# Patient Record
Sex: FEMALE | Race: WHITE | Employment: FULL TIME | URBAN - METROPOLITAN AREA
[De-identification: names, ages, dates, MRNs, and addresses within clinical notes are randomized per-mention and may not be internally consistent; named-entity substitution may affect disease eponyms.]

---

## 2017-04-17 ENCOUNTER — OFFICE VISIT (OUTPATIENT)
Dept: FAMILY MEDICINE CLINIC | Age: 52
End: 2017-04-17

## 2017-04-17 VITALS
SYSTOLIC BLOOD PRESSURE: 122 MMHG | WEIGHT: 260.4 LBS | HEIGHT: 67 IN | DIASTOLIC BLOOD PRESSURE: 84 MMHG | BODY MASS INDEX: 40.87 KG/M2 | OXYGEN SATURATION: 97 % | TEMPERATURE: 98.2 F | RESPIRATION RATE: 18 BRPM | HEART RATE: 71 BPM

## 2017-04-17 DIAGNOSIS — E78.2 MIXED HYPERLIPIDEMIA: Primary | ICD-10-CM

## 2017-04-17 DIAGNOSIS — E55.9 VITAMIN D DEFICIENCY: ICD-10-CM

## 2017-04-17 DIAGNOSIS — F41.9 ANXIETY: ICD-10-CM

## 2017-04-17 DIAGNOSIS — R63.5 WEIGHT GAIN: ICD-10-CM

## 2017-04-17 PROBLEM — F41.0 PANIC ATTACKS: Status: ACTIVE | Noted: 2017-04-17

## 2017-04-17 RX ORDER — VITAMIN E 268 MG
CAPSULE ORAL DAILY
COMMUNITY
End: 2018-02-22

## 2017-04-17 RX ORDER — BISMUTH SUBSALICYLATE 262 MG
1 TABLET,CHEWABLE ORAL DAILY
COMMUNITY
End: 2018-02-22

## 2017-04-17 RX ORDER — GLUCOSAMINE SULFATE 1500 MG
POWDER IN PACKET (EA) ORAL DAILY
COMMUNITY
End: 2018-02-22

## 2017-04-17 RX ORDER — FLUOXETINE HYDROCHLORIDE 20 MG/1
20 CAPSULE ORAL DAILY
Qty: 30 CAP | Refills: 2 | Status: SHIPPED | OUTPATIENT
Start: 2017-04-17 | End: 2017-04-21 | Stop reason: SINTOL

## 2017-04-17 NOTE — MR AVS SNAPSHOT
Visit Information Date & Time Provider Department Dept. Phone Encounter #  
 4/17/2017  8:30 AM Polina Marroquin, [address] [facility] [phone] [id number] Upcoming Health Maintenance Date Due COLONOSCOPY 1/30/1981 BREAST CANCER SCRN MAMMOGRAM 11/8/2018 PAP AKA CERVICAL CYTOLOGY 9/19/2019 DTaP/Tdap/Td series (2 - Td) 8/20/2023 Allergies as of 4/17/2017  Review Complete On: 4/17/2017 By: Polina Marroquin MD  
  
 Severity Noted Reaction Type Reactions Crestor [Rosuvastatin]  02/07/2011    Other (comments) Ears ringing Lipitor [Atorvastatin]  02/07/2011    Myalgia Current Immunizations  Reviewed on 8/20/2013 Name Date Tdap 8/20/2013 Not reviewed this visit You Were Diagnosed With   
  
 Codes Comments Mixed hyperlipidemia    -  Primary ICD-10-CM: I21.5 ICD-9-CM: 272.2 Anxiety     ICD-10-CM: F41.9 ICD-9-CM: 300.00 Weight gain     ICD-10-CM: R63.5 ICD-9-CM: 783.1 Vitamin D deficiency     ICD-10-CM: E55.9 ICD-9-CM: 268.9 Vitals BP Pulse Temp Resp Height(growth percentile) Weight(growth percentile) 122/84 (BP 1 Location: Left arm, BP Patient Position: Sitting) 71 98.2 °F (36.8 °C) (Oral) 18 5' 7\" (1.702 m) 260 lb 6.4 oz (118.1 kg) LMP SpO2 BMI OB Status Smoking Status 07/19/2013 97% 40.78 kg/m2 Postmenopausal Never Smoker BMI and BSA Data Body Mass Index Body Surface Area 40.78 kg/m 2 2.36 m 2 Preferred Pharmacy Pharmacy Name Phone [facility] [address] [facility], [address] [facility] [phone] Your Updated Medication List  
  
   
This list is accurate as of: 4/17/17  9:23 AM.  Always use your most recent med list.  
  
  
  
  
 aspirin delayed-release 81 mg tablet Take 81 mg by mouth daily. diazePAM 2 mg tablet Commonly known as:  VALIUM Take 2.5 Tabs by mouth nightly as needed for Sleep (and muscles spasms in neck, tightness and tension). Max Daily Amount: 5 mg. FLUoxetine 20 mg capsule Commonly known as:  PROzac Take 1 Cap by mouth daily. multivitamin tablet Commonly known as:  ONE A DAY Take 1 Tab by mouth daily. VITAMIN D3 1,000 unit Cap Generic drug:  cholecalciferol Take  by mouth daily. vitamin E 400 unit capsule Commonly known as:  [address] Take  by mouth daily. Prescriptions Sent to Pharmacy Refills FLUoxetine (PROZAC) 20 mg capsule 2 Sig: Take 1 Cap by mouth daily. Class: Normal  
 Pharmacy: [address] [facility], [address] [place] [address],[address] Ph #: [phone] Route: Oral  
  
We Performed the Following LIPID PANEL [15395 CPT(R)] TSH 3RD GENERATION [08506 CPT(R)] VITAMIN D, 25 HYDROXY [id number] CPT(R)] Patient Instructions Anxiety Disorder: Care Instructions Your Care Instructions Anxiety is a normal reaction to stress. Difficult situations can cause you to have symptoms such as sweaty palms and a nervous feeling. In an anxiety disorder, the symptoms are far more severe. Constant worry, muscle tension, trouble sleeping, nausea and diarrhea, and other symptoms can make normal daily activities difficult or impossible. These symptoms may occur for no reason, and they can affect your work, school, or social life. Medicines, counseling, and self-care can all help. Follow-up care is a key part of your treatment and safety. Be sure to make and go to all appointments, and call your doctor if you are having problems. It's also a good idea to know your test results and keep a list of the medicines you take. How can you care for yourself at home? · Take medicines exactly as directed. Call your doctor if you think you are having a problem with your medicine. · Go to your counseling sessions and follow-up appointments. · Recognize and accept your anxiety.  Then, when you are in a situation that makes you anxious, say to yourself, \"This is not an emergency. I feel uncomfortable, but I am not in danger. I can keep going even if I feel anxious. \" · Be kind to your body: ¨ Relieve tension with exercise or a massage. ¨ Get enough rest. 
¨ Avoid alcohol, caffeine, nicotine, and illegal drugs. They can increase your anxiety level and cause sleep problems. ¨ Learn and do relaxation techniques. See below for more about these techniques. · Engage your mind. Get out and do something you enjoy. Go to a [employer] movie, or take a walk or hike. Plan your day. Having too much or too little to do can make you anxious. · Keep a record of your symptoms. Discuss your fears with a good friend or family member, or join a support group for people with similar problems. Talking to others sometimes relieves stress. · Get involved in social groups, or volunteer to help others. Being alone sometimes makes things seem worse than they are. · Get at least 30 minutes of exercise on most days of the week to relieve stress. Walking is a good choice. You also may want to do other activities, such as running, swimming, cycling, or playing tennis or team sports. Relaxation techniques Do relaxation exercises 10 to 20 minutes a day. You can play soothing, relaxing music while you do them, if you wish. · Tell others in your house that you are going to do your relaxation exercises. Ask them not to disturb you. · Find a comfortable place, away from all distractions and noise. · Lie down on your back, or sit with your back straight. · Focus on your breathing. Make it slow and steady. · Breathe in through your nose. Breathe out through either your nose or mouth. · Breathe deeply, filling up the area between your navel and your rib cage. Breathe so that your belly goes up and down. · Do not hold your breath. · Breathe like this for 5 to 10 minutes. Notice the feeling of calmness throughout your whole body. As you continue to breathe slowly and deeply, relax by doing the following for another 5 to 10 minutes: · Tighten and relax each muscle group in your body. You can begin at your toes and work your way up to your head. · Imagine your muscle groups relaxing and becoming heavy. · Empty your mind of all thoughts. · Let yourself relax more and more deeply. · Become aware of the state of calmness that surrounds you. · When your relaxation time is over, you can bring yourself back to alertness by moving your fingers and toes and then your hands and feet and then stretching and moving your entire body. Sometimes people fall asleep during relaxation, but they usually wake up shortly afterward. · Always give yourself time to return to full alertness before you drive a car or do anything that might cause an accident if you are not fully alert. Never play a relaxation tape while you drive a car. When should you call for help? Call 911 anytime you think you may need emergency care. For example, call if: 
· You feel you cannot stop from hurting yourself or someone else. Keep the numbers for these national suicide hotlines: 2-129-449-TALK (6-922.791.4705) and 2-415-MGOIZCU (2-755.509.2501). If you or someone you know talks about suicide or feeling hopeless, get help right away. Watch closely for changes in your health, and be sure to contact your doctor if: 
· You have anxiety or fear that affects your life. · You have symptoms of anxiety that are new or different from those you had before. Where can you learn more? Go to http://rosa-susan.info/. Enter P754 in the search box to learn more about \"Anxiety Disorder: Care Instructions. \" Current as of: July 26, 2016 Content Version: 11.2 © 0499-0101 [employer], [employer].  Care instructions adapted under license by [employer] (which disclaims liability or warranty for this information). If you have questions about a medical condition or this instruction, always ask your healthcare professional. [address] any warranty or liability for your use of this information. Introducing [place]! Shruti Blackmon introduces iHandle patient portal. Now you can access parts of your medical record, email your doctor's office, and request medication refills online. 1. In your internet browser, go to [email or web address] 2. Click on the First Time User? Click Here link in the Sign In box. You will see the New Member Sign Up page. 3. Enter your iHandle Access Code exactly as it appears below. You will not need to use this code after youve completed the sign-up process. If you do not sign up before the expiration date, you must request a new code. · iHandle Access Code: [email or web address] Expires: 7/16/2017  9:23 AM 
 
4. Enter the last four digits of your Social Security Number (xxxx) and Date of Birth (mm/dd/yyyy) as indicated and click Submit. You will be taken to the next sign-up page. 5. Create a iHandle ID. This will be your iHandle login ID and cannot be changed, so think of one that is secure and easy to remember. 6. Create a iHandle password. You can change your password at any time. 7. Enter your Password Reset Question and Answer. This can be used at a later time if you forget your password. 8. Enter your e-mail address. You will receive e-mail notification when new information is available in [address] 9. Click Sign Up. You can now view and download portions of your medical record. 10. Click the Download Summary menu link to download a portable copy of your medical information. If you have questions, please visit the Frequently Asked Questions section of the iHandle website. Remember, iHandle is NOT to be used for urgent needs. For medical emergencies, dial 911. Now available from your iPhone and Android! Please provide this summary of care documentation to your next provider. Your primary care clinician is listed as MAURA GEIGER. If you have any questions after today's visit, please call [phone].

## 2017-04-17 NOTE — PATIENT INSTRUCTIONS

## 2017-04-17 NOTE — PROGRESS NOTES
HISTORY OF PRESENT ILLNESS  Wild Rogers is a 45 y.o. female. HPI  Fasting follow up cholesterol and vit D, and discuss changing Lexapro. Admits her eating habits have not been good and she is not exercising. She did start OTC vit D daily. Feels like the Lexapro is working well for her generalized anxiety and panic attacks but is worried that it has contributed a great deal to her wt gain. Since changing from Wellbutrin to the Lexapro, her wt is up. But I changed her due to increased anxiety and panic attacks. She is very happy that her anxiety has been so much better. She has not been feeling depressed either. She has been under some recent stress at work and admits she is a stress eater anyway. She has been craving carbs more, eating more and wt is going up significantly. She wants to change the Lexapro bc she feels that has made it worse. She is worried about if she goes off if that she will start having panic attacks again bc the Lexapro has really really helped w/ that, but wants to at least try. Review of Systems   Constitutional: Negative. Respiratory: Negative. Cardiovascular: Negative. Gastrointestinal: Negative. Psychiatric/Behavioral: Negative for depression.       Problem List  Date Reviewed: 4/17/2017          Codes Class Noted    Panic attacks ICD-10-CM: F41.0  ICD-9-CM: 300.01  4/17/2017        Anxiety ICD-10-CM: F41.9  ICD-9-CM: 300.00  9/19/2016    Overview Signed 9/19/2016  2:26 PM by Nando Deleon MD     [phone]             Postmenopause, LMP ~ 45 yo, 2013, No HRT ICD-10-CM: Z78.0  ICD-9-CM: V49.81  9/19/2016        Palpitations ICD-10-CM: R00.2  ICD-9-CM: 785.1  1/9/2015    Overview Signed 4/2/2015 12:40 PM by Didier Thurston MD     1/16 event monitor, pac's otherwise unremarkable             SOB (shortness of breath) ICD-10-CM: R06.02  ICD-9-CM: 786.05  1/9/2015        History of mitral valve disorder ICD-10-CM: Z86.79  ICD-9-CM: V12.59  12/19/2013 Overview Signed 12/19/2013  3:57 PM by Dami Caba MD     In her late 19's; told it was \"benign\", no follow up             Vitamin D deficiency ICD-10-CM: E55.9  ICD-9-CM: 268.9  8/20/2013    Overview Signed 8/20/2013  2:18 PM by Dami Caba MD     2011             Perimenopause ICD-10-CM: N95.1  ICD-9-CM: 627.2  8/20/2013    Overview Signed 8/20/2013  2:21 PM by Dami Caba MD     Since 2012             Scoliosis ICD-10-CM: M41.9  ICD-9-CM: 737.30  6/28/2013    Overview Signed 6/28/2013 10:18 AM by Dami Caba MD     Since childhood; no surgery or brace; PT only             Depressive disorder, not elsewhere classified ICD-10-CM: F32.9  ICD-9-CM: 098  2/7/2011        Mixed hyperlipidemia ICD-10-CM: E78.2  ICD-9-CM: 272.2  2/7/2011        Esophageal reflux ICD-10-CM: K21.9  ICD-9-CM: 530.81  2/7/2011            Past Surgical History:   Procedure Laterality Date    HX CYST REMOVAL  15 yo       Current Outpatient Prescriptions   Medication Sig    multivitamin (ONE A DAY) tablet Take 1 Tab by mouth daily.  vitamin E (AQUA GEMS) 400 unit capsule Take  by mouth daily.  cholecalciferol (VITAMIN D3) 1,000 unit cap Take  by mouth daily.  escitalopram oxalate (LEXAPRO) 10 mg tablet TAKE ONE TABLET BY MOUTH EVERY EVENING    aspirin delayed-release 81 mg tablet Take 81 mg by mouth daily.        Allergies   Allergen Reactions    Crestor [Rosuvastatin] Other (comments)     Ears ringing    Lipitor [Atorvastatin] Myalgia     Social History     Social History    Marital status:      Spouse name: N/A    Number of children: 0    Years of education: N/A     Occupational History     at the [employer] Top[employer]    Smoking status: Never Smoker    Smokeless tobacco: Never Used    Alcohol use No    Drug use: No    Sexual activity: Yes     Partners: Male      Comment: Perimenopause and Rhythm     Other Topics Concern    Caffeine Concern No     16 oz of half caff coffee, but eats alot of chocolate ~ 4-5 x a week    Stress Concern Yes     alot of stress at work   Kerry Pina Weight Concern Yes     has gained a lot of weight the past few months, stress eating, not exercising    Special Diet No     just joined [employer] [id number] and plans to start that    Exercise No     Social History Narrative     Family History   Problem Relation Age of Onset    Breast Cancer Mother      diagnosed in her 52's, survivor    Other Mother 64     heart arrhythmia    Cancer Father 61     lung    Diabetes Father      type 2    Hypertension Father     No Known Problems Sister     No Known Problems Brother     No Known Problems Brother     Breast Cancer Maternal Grandmother       in her late 55's-59's    Heart Attack Maternal Grandfather       in his mid [de-identified] from an MI    Other Paternal Grandmother       natural causes in old age   Kerry Root Other Paternal Grandfather       in car accident    Other Sister 50     heart arrhythmia    Breast Cancer Maternal Aunt      Visit Vitals    /84 (BP 1 Location: Left arm, BP Patient Position: Sitting)    Pulse 71    Temp 98.2 °F (36.8 °C) (Oral)    Resp 18    Ht 5' 7\" (1.702 m)    Wt 260 lb 6.4 oz (118.1 kg)    LMP 2013    SpO2 97%    BMI 40.78 kg/m2     Physical Exam   Constitutional: She is oriented to person, place, and time. No distress. Cardiovascular: Normal rate, regular rhythm and normal heart sounds. No murmur heard. Pulmonary/Chest: Effort normal and breath sounds normal.   Neurological: She is alert and oriented to person, place, and time. Psychiatric: She has a normal mood and affect. Her behavior is normal. Thought content normal.   Vitals reviewed. ASSESSMENT and PLAN    ICD-10-CM ICD-9-CM    1. Mixed hyperlipidemia E78.2 272.2 LIPID PANEL   2. Anxiety F41.9 300.00 FLUoxetine (PROZAC) 20 mg capsule   3.  Weight gain R63.5 783.1 TSH 3RD GENERATION 4. Vitamin D deficiency E55.9 268.9 VITAMIN D, 25 HYDROXY     Fasting labs today  Lexapro working great for her anxiety/panic d/o but significant wt gain  Would like to try alternative  Trial of Prozac 20 mg qam in its place  Reviewed medications and side effects in detail  Also reviewed diet, nutrition, exercise and weight control  She joined [employer] and plans to start that; encouraged regular exercise program, start low, advance slowly as tolerated  Cardiovascular risk and specific lipid/LDL goals reviewed  Further follow up & other recommendations pending review of labs as well  If all remains good and stable, would like to see her back for 3 month med check to follow up on SSRI treatment and wt check
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
## 2017-04-18 LAB
25(OH)D3+25(OH)D2 SERPL-MCNC: 27.8 NG/ML (ref 30–100)
CHOLEST SERPL-MCNC: 202 MG/DL (ref 100–199)
HDLC SERPL-MCNC: 48 MG/DL
INTERPRETATION, 910389: NORMAL
LDLC SERPL CALC-MCNC: 133 MG/DL (ref 0–99)
TRIGL SERPL-MCNC: 107 MG/DL (ref 0–149)
TSH SERPL DL<=0.005 MIU/L-ACNC: 1.5 UIU/ML (ref 0.45–4.5)
VLDLC SERPL CALC-MCNC: 21 MG/DL (ref 5–40)

## 2017-04-30 NOTE — PROGRESS NOTES
Vit D improving, still low but close to wnl. Cont vit D supplement daily  LDL cholesterol about same but HDL/good cholesterol went down some from last check  She had just joined [employer] and I would like her to also try to gradually build to at least 150 minutes exercise per week  Fasting follow up 6months.  RTC sooner prn

## 2017-10-15 RX ORDER — ESCITALOPRAM OXALATE 10 MG/1
TABLET ORAL
Qty: 90 TAB | Refills: 1 | Status: SHIPPED | OUTPATIENT
Start: 2017-10-15 | End: 2018-04-30 | Stop reason: SDUPTHER

## 2017-10-16 RX ORDER — ESCITALOPRAM OXALATE 10 MG/1
TABLET ORAL
Qty: 90 TAB | Refills: 1 | Status: CANCELLED | OUTPATIENT
Start: 2017-10-16

## 2017-10-16 NOTE — TELEPHONE ENCOUNTER
Pharmacy on file verified.      Requested Prescriptions     Pending Prescriptions Disp Refills    escitalopram oxalate (LEXAPRO) 10 mg tablet 90 Tab 1

## 2018-02-22 ENCOUNTER — OFFICE VISIT (OUTPATIENT)
Dept: FAMILY MEDICINE CLINIC | Age: 53
End: 2018-02-22

## 2018-02-22 VITALS
RESPIRATION RATE: 18 BRPM | BODY MASS INDEX: 42.56 KG/M2 | HEART RATE: 86 BPM | SYSTOLIC BLOOD PRESSURE: 110 MMHG | WEIGHT: 271.2 LBS | OXYGEN SATURATION: 99 % | HEIGHT: 67 IN | DIASTOLIC BLOOD PRESSURE: 76 MMHG | TEMPERATURE: 98.1 F

## 2018-02-22 DIAGNOSIS — E78.2 MIXED HYPERLIPIDEMIA: ICD-10-CM

## 2018-02-22 DIAGNOSIS — N95.1 MENOPAUSAL SYNDROME (HOT FLASHES): ICD-10-CM

## 2018-02-22 DIAGNOSIS — Z12.11 SCREENING FOR COLORECTAL CANCER: ICD-10-CM

## 2018-02-22 DIAGNOSIS — Z11.59 NEED FOR HEPATITIS C SCREENING TEST: ICD-10-CM

## 2018-02-22 DIAGNOSIS — F41.8 DEPRESSION WITH ANXIETY: ICD-10-CM

## 2018-02-22 DIAGNOSIS — Z00.00 ROUTINE GENERAL MEDICAL EXAMINATION AT A HEALTH CARE FACILITY: Primary | ICD-10-CM

## 2018-02-22 DIAGNOSIS — Z12.31 ENCOUNTER FOR SCREENING MAMMOGRAM FOR BREAST CANCER: ICD-10-CM

## 2018-02-22 DIAGNOSIS — Z12.12 SCREENING FOR COLORECTAL CANCER: ICD-10-CM

## 2018-02-22 DIAGNOSIS — E55.9 VITAMIN D DEFICIENCY: ICD-10-CM

## 2018-02-22 DIAGNOSIS — Z78.0 POSTMENOPAUSE: ICD-10-CM

## 2018-02-22 PROBLEM — E66.01 OBESITY, MORBID (HCC): Status: ACTIVE | Noted: 2018-02-22

## 2018-02-22 PROBLEM — M72.2 PLANTAR FASCIITIS, BILATERAL: Status: ACTIVE | Noted: 2018-02-22

## 2018-02-22 NOTE — PROGRESS NOTES
HISTORY OF PRESENT ILLNESS   HPI  Annual CPE, fasting. Overall has been getting along pretty well and feeling well in general.  Seeing her podiatrist routinely for problems w/ plantar fasciitis of both feet. Waiting on her orthotics. She has not been taking her vitamin D x over a year but just purchased some and will start it after recommendations based on today's labs. Also admits eating a lot of junk food. Wt is up again. She knows that losing wt will help her feet and her chronic leg edema. Just not motivated and states she loves junk food. Her mood has been good on Lexapro. Not feeling sad, depressed or anxious. She does have  hot flashes. Does not really want to do anything about them but notices they are worse when she increases her caffeine intake. REVIEW OF SYMPTOMS     Review of Systems   Constitutional: Negative. HENT: Negative. Eyes: Negative. Respiratory: Negative. Cardiovascular: Negative for chest pain, palpitations, orthopnea, claudication and PND. Gastrointestinal: Negative. Negative for abdominal pain, blood in stool, constipation, diarrhea, nausea and vomiting. Genitourinary: Negative for dysuria, frequency, hematuria and urgency. No breast complaints. No abnormal vaginal bleeding, discharge or urinary complaints. Mild vaginal dryness but not bothered by it bc they are not sexually active.     Neurological: Negative.            PROBLEM LIST/MEDICAL HISTORY      Problem List  Date Reviewed: 2/22/2018          Codes Class Noted    Ob[facility], mor[facility] ICD-10-CM: E66.01  ICD-9-CM: 278.01  2/22/2018        Plantar fasciitis, bilateral ICD-10-CM: M72.2  ICD-9-CM: 728.71  2/22/2018    Overview Signed 2/22/2018 11:30 AM by Julio Caro MD     Podiatrist Dr Jacinto Martinez 1-2018, orthotics             Panic attacks ICD-10-CM: F41.0  ICD-9-CM: 300.01  4/17/2017        Anxiety ICD-10-CM: F41.9  ICD-9-CM: 300.00  9/19/2016    Overview Signed 9/19/2016  2:26 PM by Jigar Pineda MD     [id number]             Postmenopause, LMP ~ 45 yo, 2013, No HRT ICD-10-CM: Z78.0  ICD-9-CM: V49.81  9/19/2016        Palpitations ICD-10-CM: R00.2  ICD-9-CM: 785.1  1/9/2015    Overview Signed 4/2/2015 12:40 PM by Treva Doll MD     1/16 event monitor, pac's otherwise unremarkable             SOB (shortness of breath) ICD-10-CM: R06.02  ICD-9-CM: 786.05  1/9/2015        History of mitral valve disorder ICD-10-CM: Z86.79  ICD-9-CM: V12.59  12/19/2013    Overview Signed 12/19/2013  3:57 PM by Jigar Pineda MD     In her late 19's; told it was \"benign\", no follow up             Vitamin D deficiency ICD-10-CM: E55.9  ICD-9-CM: 268.9  8/20/2013    Overview Signed 8/20/2013  2:18 PM by Jigar Pineda MD     2011             Perimenopause ICD-10-CM: N95.1  ICD-9-CM: 627.2  8/20/2013    Overview Signed 8/20/2013  2:21 PM by Jigar Pineda MD     Since 2012             Scoliosis ICD-10-CM: M41.9  ICD-9-CM: 737.30  6/28/2013    Overview Signed 6/28/2013 10:18 AM by Jigar Pineda MD     Since childhood; no surgery or brace; PT only             Depressive disorder, not elsewhere classified ICD-10-CM: F32.9  ICD-9-CM: 535  2/7/2011        Mixed hyperlipidemia ICD-10-CM: E78.2  ICD-9-CM: 272.2  2/7/2011        Esophageal reflux ICD-10-CM: K21.9  ICD-9-CM: 530.81  2/7/2011                  PAST SURGICAL HISTORY       Past Surgical History:   Procedure Laterality Date    HX CYST REMOVAL  13 yo         MEDICATIONS      Current Outpatient Prescriptions   Medication Sig    escitalopram oxalate (LEXAPRO) 10 mg tablet TAKE ONE TABLET BY MOUTH ONCE DAILY IN  THE  EVENING    aspirin delayed-release 81 mg tablet Take 81 mg by mouth daily. No current facility-administered medications for this visit.            ALLERGIES     Allergies   Allergen Reactions    Crestor [Rosuvastatin] Other (comments)     Ears ringing    Lipitor [Atorvastatin] Myalgia    Prozac [Fluoxetine] Other (comments)     Dizziness and lack of coordination          SOCIAL HISTORY       Social History     Social History    Marital status:      Spouse name: N/A    Number of children: 0    Years of education: N/A     Occupational History     at the [employer] Topics    Smoking status: Never Smoker    Smokeless tobacco: Never Used    Alcohol use No    Drug use: No    Sexual activity: Yes     Partners: Male      Comment: Perimenopause and Rhythm     Other Topics Concern    Caffeine Concern No     16 oz of half caff coffee, but eats alot of chocolate ~ 4-5 x a week    Weight Concern Yes     has gained a lot of weight again, not eating healthy, minimal exercise    Special Diet No     nothing lately, eating lots of junk food; did [place] Rule ~ 2016 x 1 yr, successfully lost ~ 100 lbs but stopped bc it got expensive and regained the wt    Exercise Yes     walks puppy qd x 20 minutes     Social History Narrative        IMMUNIZATIONS  Immunization History   Administered Date(s) Administered    Tdap 2013         FAMILY HISTORY     Family History   Problem Relation Age of Onset    Breast Cancer Mother      diagnosed in her 52's, survivor    Other Mother 64     heart arrhythmia    Cancer Father 61     lung    Diabetes Father      type 2    Hypertension Father     No Known Problems Sister     No Known Problems Brother     No Known Problems Brother     Breast Cancer Maternal Grandmother       in her late 55's-59's    Heart Attack Maternal Grandfather       in his mid [de-identified] from an MI    Other Paternal Grandmother       natural causes in old age   [facility] Other Paternal Grandfather       in car accident    Other Sister 50     heart arrhythmia    Breast Cancer Maternal Aunt          VITALS     Visit Vitals    /76 (BP 1 Location: Left arm, BP Patient Position: Sitting)    Pulse 86    Temp 98.1 °F (36.7 °C) (Oral)    Resp 18    Ht 5' 7\" (1.702 m)    Wt 271 lb 3.2 oz (123 kg)    LMP 07/19/2013    SpO2 99%    BMI 42.48 kg/m2          PHYSICAL EXAMINATION     Physical Exam   Constitutional: She is oriented to person, place, and time. No distress. HENT:   Right Ear: Tympanic membrane and ear canal normal.   Left Ear: Tympanic membrane and ear canal normal.   Nose: Nose normal.   Mouth/Throat: Oropharynx is clear and moist. No oropharyngeal exudate. Eyes: Conjunctivae and EOM are normal. Pupils are equal, round, and reactive to light. Neck: Neck supple. No JVD present. Carotid bruit is not present. No thyromegaly present. Cardiovascular: Normal rate, regular rhythm and normal heart sounds. Exam reveals no gallop. No murmur heard. Pulmonary/Chest: Effort normal and breath sounds normal. No respiratory distress. She has no wheezes. She has no rales. Right breast exhibits no inverted nipple, no mass, no nipple discharge, no skin change and no tenderness. Left breast exhibits no inverted nipple, no mass, no nipple discharge, no skin change and no tenderness. Abdominal: Soft. There is no tenderness. Obese     Genitourinary: Vagina normal, uterus normal, right adnexa normal and left adnexa normal.   Genitourinary Comments: Pap deferred this year, negative 9-2016   Musculoskeletal: She exhibits no tenderness. Large legs, obese. Deep sock impression at B ankles. Skin warm, dry, intact. No erythema. Uses stockings prn   Lymphadenopathy:     She has no cervical adenopathy. She has no axillary adenopathy. Right: No supraclavicular adenopathy present. Left: No supraclavicular adenopathy present. Neurological: She is alert and oriented to person, place, and time. Coordination normal.   Skin: Skin is warm and dry. Psychiatric: Mood, affect and judgment normal.   Vitals reviewed.              ASSESSMENT & PLAN       ICD-10-CM ICD-9-CM    1.  Routine general medical examination at a [facility] care facility Z00.00 V70.0 LIPID PANEL      METABOLIC PANEL, COMPREHENSIVE      CBC W/O DIFF      TSH 3RD GENERATION      VITAMIN D, 25 HYDROXY      HEMOGLOBIN A1C WITH EAG   2. Postmenopause, LMP ~ 47 yo, 2013, No HRT Z78.0 V49.81    3. Menopausal syndrome (hot flashes) N95.1 627.2    4. Encounter for screening mammogram for breast cancer Z12.31 V76.12 MARILU MAMMO BI SCREENING INCL CAD   5. Screening for colorectal cancer Z12.11 V76.51 COLOGUARD TEST (FECAL DNA COLORECTAL CANCER SCREENING)    Z12.12     6. Mixed hyperlipidemia E78.2 272.2 LIPID PANEL   7. Vitamin D deficiency E55.9 268.9 VITAMIN D, 25 HYDROXY   8. Depression with anxiety F41.8 300.4    9. Need for hepatitis C screening test Z11.59 V73.89 HEPATITIS C AB     Fasting labs today  Reviewed diet, exercise and weight control  Patient counseled about current BMI, goals, diet, nutrition, exercise, weight management. Nutrition/meal planning discussed. Monitor weights. Appropriate patient education materials included with or without corresponding AVS via handout or MeterHerot if activated. Reassess at next follow up visit. Cardiovascular risk and specific lipid/LDL goals reviewed  Reviewed medications and side effects. Doing well on Lexapro and wants to cont same regimen at this time. For  hot flashes, menopause diet/care tips shared w/ pt, handouts/AVS given  Further follow up & other recommendations pending review of labs. If all remains good and stable, RTC 1 yr annual CPE.  Follow up sooner prn

## 2018-02-22 NOTE — PROGRESS NOTES
Chief Complaint   Patient presents with    Complete Physical     fasting - does have well woman here- last pap 9/2016     1. Have you been to the ER, urgent care clinic since your last visit? Hospitalized since your last visit? No    2. Have you seen or consulted any other health care providers outside of the [address] [place] [address] since your last visit? Include any pap smears or colon screening.    Yes [facility]           Dr Laura Muir

## 2018-02-22 NOTE — MR AVS SNAPSHOT
[address] 
[phone] Patient: Gallo Weber MRN: [id number] MLI:[id number] Visit Information Date & Time Provider Department Dept. Phone Encounter #  
 2/22/2018 11:00 AM Aubree Bearden, [address] [phone] [id number] Upcoming Health Maintenance Date Due COLONOSCOPY 1/30/1981 BREAST CANCER SCRN MAMMOGRAM 11/8/2018 PAP AKA CERVICAL CYTOLOGY 9/19/2019 DTaP/Tdap/Td series (2 - Td) 8/20/2023 Allergies as of 2/22/2018  Review Complete On: 2/22/2018 By: Aubree Bearden MD  
  
 Severity Noted Reaction Type Reactions Crestor [Rosuvastatin]  02/07/2011    Other (comments) Ears ringing Lipitor [Atorvastatin]  02/07/2011    Myalgia Prozac [Fluoxetine] Low 04/21/2017   Side Effect Other (comments) Dizziness and lack of coordination Current Immunizations  Reviewed on 8/20/2013 Name Date Tdap 8/20/2013 Not reviewed this visit You Were Diagnosed With   
  
 Codes Comments Routine general medical examination at a health care facility    -  Primary ICD-10-CM: Z00.00 ICD-9-CM: V70.0 Postmenopause     ICD-10-CM: Z78.0 ICD-9-CM: V49.81 Menopausal syndrome (hot flashes)     ICD-10-CM: N95.1 ICD-9-CM: 627.2 Encounter for screening mammogram for breast cancer     ICD-10-CM: Z12.31 
ICD-9-CM: V76.12 Screening for colorectal cancer     ICD-10-CM: Z12.11, Z12.12 
ICD-9-CM: V76.51 Mixed hyperlipidemia     ICD-10-CM: E78.2 ICD-9-CM: 272.2 Vitamin D deficiency     ICD-10-CM: E55.9 ICD-9-CM: 268.9 Vitals BP Pulse Temp Resp Height(growth percentile) Weight(growth percentile) 110/76 (BP 1 Location: Left arm, BP Patient Position: Sitting) 86 98.1 °F (36.7 °C) (Oral) 18 5' 7\" (1.702 m) 271 lb 3.2 oz (123 kg) LMP SpO2 BMI OB Status Smoking Status 07/19/2013 99% 42.48 kg/m2 Postmenopausal Never Smoker BMI and BSA Data Body Mass Index Body Surface Area  
 42.48 kg/m 2 2.41 m 2 Preferred Pharmacy Pharmacy Name Phone [facility] [address] [facility], [address] [facility] [phone] Your Updated Medication List  
  
   
This list is accurate as of 2/22/18 11:52 AM.  Always use your most recent med list.  
  
  
  
  
 aspirin delayed-release 81 mg tablet Take 81 mg by mouth daily. escitalopram oxalate 10 mg tablet Commonly known as:  Hope Bong TAKE ONE TABLET BY MOUTH ONCE DAILY IN  THE  EVENING We Performed the Following CBC W/O DIFF [31074 CPT(R)] COLOGUARD TEST (FECAL DNA COLORECTAL CANCER SCREENING) [20752 CPT(R)] HEMOGLOBIN A1C WITH EAG [99954 CPT(R)] LIPID PANEL [12914 CPT(R)] METABOLIC PANEL, COMPREHENSIVE [76248 CPT(R)] TSH 3RD GENERATION [66460 CPT(R)] VITAMIN D, 25 HYDROXY [id number] CPT(R)] To-Do List   
 02/22/2018 Imaging:  MARILU MAMMO BI SCREENING INCL CAD Patient Instructions Menopause Diet: Care Instructions Your Care Instructions Healthy eating helps ease menopause symptoms. And it can reduce your risk for getting conditions such as osteoporosis and heart disease. Follow-up care is a key part of your treatment and safety. Be sure to make and go to all appointments, and call your doctor if you are having problems. It's also a good idea to know your test results and keep a list of the medicines you take. How can you care for yourself at home? · Limit fats in your diet. · Choose foods that have a lot of calcium. The recommended daily intake for adults ages 23 to 48 is 1,000 milligrams (mg). Adults over 50 need 1,200 mg a day. Take a calcium supplement if you don't get enough calcium in the foods you eat. · Add vitamin D to your daily diet. It helps your body use calcium.  The recommended daily intake of vitamin D is 600 international units (IU) a day for children and adults up to age 79. Adults age 70 and older need 800 IU a day. Take vitamin D supplements if you need to. · Include good sources of fiber in your diet each day. These include whole grains, beans, fruits, and vegetables. · Avoid simple sugars. This helps if you have mood swings, anxiety, or depression. · Avoid caffeine, or cut back on it. Caffeine can cause sleep problems. It can also make you feel anxious. To relieve these symptoms, pay attention to how much caffeine you are getting in drinks and chocolate. · Limit your intake of alcohol. Heavy drinking tends to make symptoms worse. Where can you learn more? Go to http://rosa-susan.info/. Enter Y480 in the search box to learn more about \"Menopause Diet: Care Instructions. \" Current as of: May 12, 2017 Content Version: 11.4 © 7994-0733 [employer]. Care instructions adapted under license by [employer] (which disclaims liability or warranty for this information). If you have questions about a medical condition or this instruction, always ask your healthcare professional. [place] [address] any warranty or liability for your use of this information. Hot Flashes During Menopause: Care Instructions Your Care Instructions A hot flash is a sudden feeling of intense body heat. Your head, neck, and chest may get red. Your heartbeat may speed up, and you may feel anxious or irritable. You may find that hot flashes occur more often in warm rooms or during stressful times. Hot flashes and other symptoms are a normal response to the hormone changes that occur before your menstrual cycle goes away completely (menopause). Hot flashes often get better and go away with time.  Making a few changes, such as exercising more, practicing meditation, quitting smoking, and drinking less alcohol, can help. Some women take hormone pills or other medicine to treat bothersome symptoms. Follow-up care is a key part of your treatment and safety. Be sure to make and go to all appointments, and call your doctor if you are having problems. It's also a good idea to know your test results and keep a list of the medicines you take. How can you care for yourself at home? · If you decide to take medicine to treat hot flashes, take it exactly as prescribed. Call your doctor if you think you are having a problem with your medicine. You will get more details on the specific medicine your doctor prescribes. · Learn to meditate. Sit quietly and focus on your breathing. Try to practice each day. Books, classes, and tapes can help you start a program. 
· Wear natural fabrics, such as cotton and silk. Dress in layers so you can take off clothes as needed. · Keep the room temperature cool, or use a fan. You are more likely to have a hot flash when you are too warm than when you are cool. · Use fewer blankets when you sleep at night. · Drink cold fluids rather than hot ones. Limit your intake of caffeine and alcohol. · Eat smaller meals more often during the day so your body makes less heat than when digesting large amounts of food. Eat low-fat and high-fiber foods. · Do not smoke. Smoking can make hot flashes worse. If you need help quitting, talk to your doctor about stop-smoking programs and medicines. These can increase your chances of quitting for good. · Get at least 30 minutes of exercise on most days of the week. Walking is a good choice. You also may want to do other activities, such as running, swimming, cycling, or playing tennis or team sports. Where can you learn more? Go to http://rosa-susan.info/. Enter F700 in the search box to learn more about \"Hot Flashes During Menopause: Care Instructions. \" Current as of: October 13, 2016 Content Version: 11.4 © 5122-3850 [employer]. Care instructions adapted under license by [employer] (which disclaims liability or warranty for this information). If you have questions about a medical condition or this instruction, always ask your healthcare professional. [address] any warranty or liability for your use of this information. Introducing [facility]! [address] introduces [employer] patient portal. Now you can access parts of your medical record, email your doctor's office, and request medication refills online. 1. In your internet browser, go to [email or web address] 2. Click on the First Time User? Click Here link in the Sign In box. You will see the New Member Sign Up page. 3. Enter your [employer] Access Code exactly as it appears below. You will not need to use this code after youve completed the sign-up process. If you do not sign up before the expiration date, you must request a new code. · [employer] Access Code: [email or web address] Expires: 5/23/2018 11:52 AM 
 
4. Enter the last four digits of your Social Security Number (xxxx) and Date of Birth (mm/dd/yyyy) as indicated and click Submit. You will be taken to the next sign-up page. 5. Create a [facility] ID. This will be your [employer] login ID and cannot be changed, so think of one that is secure and easy to remember. 6. Create a [employer] password. You can change your password at any time. 7. Enter your Password Reset Question and Answer. This can be used at a later time if you forget your password. 8. Enter your e-mail address. You will receive e-mail notification when new information is available in [address] 9. Click Sign Up. You can now view and download portions of your medical record. 10. Click the Download Summary menu link to download a portable copy of your medical information.  
 
If you have questions, please visit the Frequently Asked Questions section of the [employer]. Remember, [employer] is NOT to be used for urgent needs. For medical emergencies, dial 911. Now available from your iPhone and Android! Please provide this summary of care documentation to your next provider. Your primary care clinician is listed as MAURA GEIGER. If you have any questions after today's visit, please call [phone].

## 2018-02-22 NOTE — PATIENT INSTRUCTIONS
Menopause Diet: Care Instructions  Your Care Instructions    Healthy eating helps ease menopause symptoms. And it can reduce your risk for getting conditions such as osteoporosis and heart disease. Follow-up care is a key part of your treatment and safety. Be sure to make and go to all appointments, and call your doctor if you are having problems. It's also a good idea to know your test results and keep a list of the medicines you take. How can you care for yourself at home? · Limit fats in your diet. · Choose foods that have a lot of calcium. The recommended daily intake for adults ages 23 to 48 is 1,000 milligrams (mg). Adults over 50 need 1,200 mg a day. Take a calcium supplement if you don't get enough calcium in the foods you eat. · Add vitamin D to your daily diet. It helps your body use calcium. The recommended daily intake of vitamin D is 600 international units (IU) a day for children and adults up to age 79. Adults age 70 and older need 800 IU a day. Take vitamin D supplements if you need to. · Include good sources of fiber in your diet each day. These include whole grains, beans, fruits, and vegetables. · Avoid simple sugars. This helps if you have mood swings, anxiety, or depression. · Avoid caffeine, or cut back on it. Caffeine can cause sleep problems. It can also make you feel anxious. To relieve these symptoms, pay attention to how much caffeine you are getting in drinks and chocolate. · Limit your intake of alcohol. Heavy drinking tends to make symptoms worse. Where can you learn more? Go to http://rosa-susan.info/. Enter Q128 in the search box to learn more about \"Menopause Diet: Care Instructions. \"  Current as of: May 12, 2017  Content Version: 11.4  © 4749-5102 [employer]. Care instructions adapted under license by [employer] (which disclaims liability or warranty for this information).  If you have questions about a medical condition or this instruction, always ask your healthcare professional. [address] any warranty or liability for your use of this information. Hot Flashes During Menopause: Care Instructions  Your Care Instructions    A hot flash is a sudden feeling of intense body heat. Your head, neck, and chest may get red. Your heartbeat may speed up, and you may feel anxious or irritable. You may find that hot flashes occur more often in warm rooms or during stressful times. Hot flashes and other symptoms are a normal response to the hormone changes that occur before your menstrual cycle goes away completely (menopause). Hot flashes often get better and go away with time. Making a few changes, such as exercising more, practicing meditation, quitting smoking, and drinking less alcohol, can help. Some women take hormone pills or other medicine to treat bothersome symptoms. Follow-up care is a key part of your treatment and safety. Be sure to make and go to all appointments, and call your doctor if you are having problems. It's also a good idea to know your test results and keep a list of the medicines you take. How can you care for yourself at home? · If you decide to take medicine to treat hot flashes, take it exactly as prescribed. Call your doctor if you think you are having a problem with your medicine. You will get more details on the specific medicine your doctor prescribes. · Learn to meditate. Sit quietly and focus on your breathing. Try to practice each day. Books, classes, and tapes can help you start a program.  · Wear natural fabrics, such as cotton and silk. Dress in layers so you can take off clothes as needed. · Keep the room temperature cool, or use a fan. You are more likely to have a hot flash when you are too warm than when you are cool. · Use fewer blankets when you sleep at night. · Drink cold fluids rather than hot ones. Limit your intake of caffeine and alcohol.   · Eat smaller meals more often during the day so your body makes less heat than when digesting large amounts of food. Eat low-fat and high-fiber foods. · Do not smoke. Smoking can make hot flashes worse. If you need help quitting, talk to your doctor about stop-smoking programs and medicines. These can increase your chances of quitting for good. · Get at least 30 minutes of exercise on most days of the week. Walking is a good choice. You also may want to do other activities, such as running, swimming, cycling, or playing tennis or team sports. Where can you learn more? Go to http://rosa-susan.info/. Enter F700 in the search box to learn more about \"Hot Flashes During Menopause: Care Instructions. \"  Current as of: October 13, 2016  Content Version: 11.4  © 1256-2198 [employer]. Care instructions adapted under license by [employer] (which disclaims liability or warranty for this information). If you have questions about a medical condition or this instruction, always ask your healthcare professional. [place] [address] any warranty or liability for your use of this information. Body Mass Index: Care Instructions  Your Care Instructions    Body mass index (BMI) can help you see if your weight is raising your risk for health problems. It uses a formula to compare how much you weigh with how tall you are. · A BMI lower than 18.5 is considered underweight. · A BMI between 18.5 and 24.9 is considered healthy. · A BMI between 25 and 29.9 is considered overweight. A BMI of 30 or higher is considered obese. If your BMI is in the normal range, it means that you have a lower risk for weight-related health problems. If your BMI is in the overweight or obese range, you may be at increased risk for weight-related health problems, such as high blood pressure, heart disease, stroke, arthritis or joint pain, and diabetes.  If your BMI is in the underweight range, you may be at increased risk for health problems such as fatigue, lower protection (immunity) against illness, muscle loss, bone loss, hair loss, and hormone problems. BMI is just one measure of your risk for weight-related health problems. You may be at higher risk for health problems if you are not active, you eat an unhealthy diet, or you drink too much alcohol or use tobacco products. Follow-up care is a key part of your treatment and safety. Be sure to make and go to all appointments, and call your doctor if you are having problems. It's also a good idea to know your test results and keep a list of the medicines you take. How can you care for yourself at home? · Practice healthy eating habits. This includes eating plenty of fruits, vegetables, whole grains, lean protein, and low-fat dairy. · If your doctor recommends it, get more exercise. Walking is a good choice. Bit by bit, increase the amount you walk every day. Try for at least 30 minutes on most days of the week. · Do not smoke. Smoking can increase your risk for health problems. If you need help quitting, talk to your doctor about stop-smoking programs and medicines. These can increase your chances of quitting for good. · Limit alcohol to 2 drinks a day for men and 1 drink a day for women. Too much alcohol can cause health problems. If you have a BMI higher than 25  · Your doctor may do other tests to check your risk for weight-related health problems. This may include measuring the distance around your waist. A waist measurement of more than 40 inches in men or 35 inches in women can increase the risk of weight-related health problems. · Talk with your doctor about steps you can take to stay healthy or improve your health. You may need to make lifestyle changes to lose weight and stay healthy, such as changing your diet and getting regular exercise.   If you have a BMI lower than 18.5  · Your doctor may do other tests to check your risk for health problems. · Talk with your doctor about steps you can take to stay healthy or improve your health. You may need to make lifestyle changes to gain or maintain weight and stay healthy, such as getting more healthy foods in your diet and doing exercises to build muscle. Where can you learn more? Go to http://rosa-susan.info/. Enter S176 in the search box to learn more about \"Body Mass Index: Care Instructions. \"  Current as of: October 13, 2016  Content Version: 11.4  © 3261-9412 [employer]. Care instructions adapted under license by [employer] (which disclaims liability or warranty for this information). If you have questions about a medical condition or this instruction, always ask your healthcare professional. [address] any warranty or liability for your use of this information.

## 2018-02-23 LAB
25(OH)D3+25(OH)D2 SERPL-MCNC: 21.4 NG/ML (ref 30–100)
ALBUMIN SERPL-MCNC: 4.3 G/DL (ref 3.5–5.5)
ALBUMIN/GLOB SERPL: 1.5 {RATIO} (ref 1.2–2.2)
ALP SERPL-CCNC: 71 IU/L (ref 39–117)
ALT SERPL-CCNC: 23 IU/L (ref 0–32)
AST SERPL-CCNC: 20 IU/L (ref 0–40)
BILIRUB SERPL-MCNC: 0.5 MG/DL (ref 0–1.2)
BUN SERPL-MCNC: 11 MG/DL (ref 6–24)
BUN/CREAT SERPL: 14 (ref 9–23)
CALCIUM SERPL-MCNC: 9.3 MG/DL (ref 8.7–10.2)
CHLORIDE SERPL-SCNC: 100 MMOL/L (ref 96–106)
CHOLEST SERPL-MCNC: 246 MG/DL (ref 100–199)
CO2 SERPL-SCNC: 27 MMOL/L (ref 18–29)
CREAT SERPL-MCNC: 0.81 MG/DL (ref 0.57–1)
ERYTHROCYTE [DISTWIDTH] IN BLOOD BY AUTOMATED COUNT: 13.6 % (ref 12.3–15.4)
EST. AVERAGE GLUCOSE BLD GHB EST-MCNC: 105 MG/DL
GFR SERPLBLD CREATININE-BSD FMLA CKD-EPI: 82 ML/MIN/{1.73_M2}
GFR SERPLBLD CREATININE-BSD FMLA CKD-EPI: 95 ML/MIN/{1.73_M2}
GLOBULIN SER CALC-MCNC: 2.8 G/L (ref 1.5–4.5)
GLUCOSE SERPL-MCNC: 91 MG/DL (ref 65–99)
HBA1C MFR BLD: 5.3 % (ref 4.8–5.6)
HCT VFR BLD AUTO: 40.8 % (ref 34–46.6)
HCV AB S/CO SERPL IA: <0.1 S/CO RATIO (ref 0–0.9)
HDLC SERPL-MCNC: 55 MG/DL
HGB BLD-MCNC: 13.2 G/DL (ref 11.1–15.9)
INTERPRETATION, 910389: NORMAL
LDLC SERPL CALC-MCNC: 156 MG/DL (ref 0–99)
MCH RBC QN AUTO: 29.5 PG (ref 26.6–33)
MCHC RBC AUTO-ENTMCNC: 32.4 G/DL (ref 31.5–35.7)
MCV RBC AUTO: 91 FL (ref 79–97)
PLATELET # BLD AUTO: 243 X10E3/UL (ref 150–379)
POTASSIUM SERPL-SCNC: 4.1 MMOL/L (ref 3.5–5.2)
PROT SERPL-MCNC: 7.1 G/DL (ref 6–8.5)
RBC # BLD AUTO: 4.48 X10E6/UL (ref 3.77–5.28)
SODIUM SERPL-SCNC: 141 MMOL/L (ref 134–144)
TRIGL SERPL-MCNC: 177 MG/DL (ref 0–149)
TSH SERPL DL<=0.005 MIU/L-ACNC: 1.59 UIU/ML (ref 0.45–4.5)
VLDLC SERPL CALC-MCNC: 35 MG/DL (ref 5–40)
WBC # BLD AUTO: 4.3 X10E3/UL (ref 3.4–10.8)

## 2018-03-01 ENCOUNTER — TELEPHONE (OUTPATIENT)
Dept: FAMILY MEDICINE CLINIC | Age: 53
End: 2018-03-01

## 2018-03-01 NOTE — TELEPHONE ENCOUNTER
Dorina Dumont calling from [place] Dr. Zari Steve on the process of this DNA Stool Test, order status, medicare guidelines for the test.   Dr. Zari Steve order number: [id number]    Phone number: [phone] [facility] provider support service.

## 2018-03-04 ENCOUNTER — TELEPHONE (OUTPATIENT)
Dept: FAMILY MEDICINE CLINIC | Age: 53
End: 2018-03-04

## 2018-03-04 NOTE — TELEPHONE ENCOUNTER
Blood counts, liver, kidney, thyroid normal  Hep C negative  Vitamin D. She told me she has not been taking Vitamin D x over a year. I would recommend otc Vit D 3 1,000 units daily  Fasting BS and HgBA1c both very good and normal  TG high and have gone up from last checks and LDL has worsened, highest it has been the last several checks. HDL remains good. At her last appt, she admitted she had not been eating healthy or exercising. Follow guidelines below and have fasting follow up cholesterol check in 6months    Adhere to the following Lifestyle Modifications below:  Diet:  ~Consume a dietary pattern that emphasizes intake of vegetables, fruits, whole grains, poultry, fish, low fat dairy, legumes, non tropical vegetable oils and nuts. ~Avoid red meat, saturated fats, fried foods, sweets, and sugars. Limit carbs to no more than one serving per meal and <40 g per serving. Limit snacks to <15 g carbs. Increase lean protein. ~Avoid added salt and if you have a history of high blood pressure also avoid added salt and limit sodium intake to < 2 grams per day. ~Reference the AHA (American Heart Association) Diet or DASH diet for additional guidelines. ~Limit alcohol to no more than 1 standard drink per day for women and 2 for men (ie/ 12 oz beer, 5 oz wine, 1.5 oz liquor). ~Avoid tobacco products. ~Limit caffeine to no more than 1-2 small servings per day. Exercise:  ~Get regular moderate intensity cardio/aerobic exercise at least 150 minutes per week ie/ 30-50 minutes 3-4 x a week.

## 2018-09-04 ENCOUNTER — OFFICE VISIT (OUTPATIENT)
Dept: FAMILY MEDICINE CLINIC | Age: 53
End: 2018-09-04

## 2018-09-04 VITALS
BODY MASS INDEX: 45.17 KG/M2 | HEIGHT: 67 IN | HEART RATE: 77 BPM | OXYGEN SATURATION: 97 % | DIASTOLIC BLOOD PRESSURE: 80 MMHG | TEMPERATURE: 98.5 F | SYSTOLIC BLOOD PRESSURE: 110 MMHG | WEIGHT: 287.8 LBS | RESPIRATION RATE: 16 BRPM

## 2018-09-04 DIAGNOSIS — E78.2 MIXED HYPERLIPIDEMIA: Primary | ICD-10-CM

## 2018-09-04 DIAGNOSIS — F41.9 ANXIETY: ICD-10-CM

## 2018-09-04 RX ORDER — ROSUVASTATIN CALCIUM 5 MG/1
5 TABLET, COATED ORAL
Qty: 90 TAB | Refills: 0 | Status: SHIPPED | OUTPATIENT
Start: 2018-09-04 | End: 2018-11-18 | Stop reason: SDUPTHER

## 2018-09-04 NOTE — MR AVS SNAPSHOT
[facility] 
 
 
 [address] [facility] [address] 
[phone] Patient: Brenna Brady MRN: [id number] OEL:[id number] Visit Information Date & Time Provider Department Dept. Phone Encounter #  
 9/4/2018  8:00 AM [address] [facility] [address],  [facility] [phone] [id number] Upcoming Health Maintenance Date Due  
 BREAST CANCER SCRN MAMMOGRAM 11/8/2018 Influenza Age 5 to Adult 3/1/2019* PAP AKA CERVICAL CYTOLOGY 9/19/2019 DTaP/Tdap/Td series (2 - Td) 8/20/2023 COLONOSCOPY 2/22/2028 *Topic was postponed. The date shown is not the original due date. Allergies as of 9/4/2018  Review Complete On: 9/4/2018 By: [address] [facility] [address], [place]  
  
 Severity Noted Reaction Type Reactions Bee Sting [Sting, Bee]  09/04/2018    Swelling Lipitor [Atorvastatin]  02/07/2011    Myalgia Prozac [Fluoxetine] Low 04/21/2017   Side Effect Other (comments) Dizziness and lack of coordination Current Immunizations  Reviewed on 8/20/2013 Name Date Tdap 8/20/2013 Not reviewed this visit You Were Diagnosed With   
  
 Codes Comments Mixed hyperlipidemia    -  Primary ICD-10-CM: Z21.1 ICD-9-CM: 272.2 Anxiety     ICD-10-CM: F41.9 ICD-9-CM: 300.00 Vitals BP Pulse Temp Resp Height(growth percentile) Weight(growth percentile) 110/80 (BP 1 Location: Right arm, BP Patient Position: Sitting) 77 98.5 °F (36.9 °C) (Oral) 16 5' 7\" (1.702 m) 287 lb 12.8 oz (130.5 kg) LMP SpO2 BMI OB Status Smoking Status 07/19/2013 97% 45.08 kg/m2 Postmenopausal Never Smoker Vitals History BMI and BSA Data Body Mass Index Body Surface Area 45.08 kg/m 2 2.48 m 2 Preferred Pharmacy Pharmacy Name Phone [facility] [address] [facility], [address] [phone] Your Updated Medication List  
  
   
 This list is accurate as of 9/4/18  8:34 AM.  Always use your most recent med list.  
  
  
  
  
 aspirin delayed-release 81 mg tablet Take 81 mg by mouth daily. escitalopram oxalate 10 mg tablet Commonly known as:  Wilene Heads TAKE ONE TABLET BY MOUTH ONCE DAILY IN THE EVENING  
  
 rosuvastatin 5 mg tablet Commonly known as:  CRESTOR Take 1 Tab by mouth nightly. Indications: mixed hyperlipidemia Prescriptions Sent to Pharmacy Refills  
 rosuvastatin (CRESTOR) 5 mg tablet 0 Sig: Take 1 Tab by mouth nightly. Indications: mixed hyperlipidemia Class: Normal  
 Pharmacy: [address] [facility], [address] [place] [address],[address]  #: [phone] Route: Oral  
  
Patient Instructions High Cholesterol: Care Instructions Your Care Instructions Cholesterol is a type of fat in your blood. It is needed for many body functions, such as making new cells. Cholesterol is made by your body. It also comes from food you eat. High cholesterol means that you have too much of the fat in your blood. This raises your risk of a heart attack and stroke. LDL and HDL are part of your total cholesterol. LDL is the \"bad\" cholesterol. High LDL can raise your risk for heart disease, heart attack, and stroke. HDL is the \"good\" cholesterol. It helps clear bad cholesterol from the body. High HDL is linked with a lower risk of heart disease, heart attack, and stroke. Your cholesterol levels help your doctor find out your risk for having a heart attack or stroke. You and your doctor can talk about whether you need to lower your risk and what treatment is best for you. A heart-healthy lifestyle along with medicines can help lower your cholesterol and your risk. The way you choose to lower your risk will depend on how high your risk is for heart attack and stroke. It will also depend on how you feel about taking medicines. Follow-up care is a key part of your treatment and safety. Be sure to make and go to all appointments, and call your doctor if you are having problems. It's also a good idea to know your test results and keep a list of the medicines you take. How can you care for yourself at home? · Eat a variety of foods every day. Good choices include fruits, vegetables, whole grains (like oatmeal), dried beans and peas, nuts and seeds, soy products (like tofu), and fat-free or low-fat dairy products. · Replace butter, margarine, and hydrogenated or partially hydrogenated oils with olive and canola oils. (Canola oil margarine without trans fat is fine.) · Replace red meat with fish, poultry, and soy protein (like tofu). · Limit processed and packaged foods like chips, crackers, and cookies. · Bake, broil, or steam foods. Don't robledo them. · Be physically active. Get at least 30 minutes of exercise on most days of the week. Walking is a good choice. You also may want to do other activities, such as running, swimming, cycling, or playing tennis or team sports. · Stay at a healthy weight or lose weight by making the changes in eating and physical activity listed above. Losing just a small amount of weight, even 5 to 10 pounds, can reduce your risk for having a heart attack or stroke. · Do not smoke. When should you call for help? Watch closely for changes in your health, and be sure to contact your doctor if: 
  · You need help making lifestyle changes.  
  · You have questions about your medicine. Where can you learn more? Go to http://rosa-susan.info/. Enter V854 in the search box to learn more about \"High Cholesterol: Care Instructions. \" Current as of: May 10, 2017 Content Version: 11.7 © 9897-1742 [employer], [employer].  Care instructions adapted under license by [employer] (which disclaims liability or warranty for this information). If you have questions about a medical condition or this instruction, always ask your healthcare professional. [address] any warranty or liability for your use of this information. Introducing [facility]! [employer] introduces AGRIMAPS patient portal. Now you can access parts of your medical record, email your doctor's office, and request medication refills online. 1. In your internet browser, go to [email or web address] 2. Click on the First Time User? Click Here link in the Sign In box. You will see the New Member Sign Up page. 3. Enter your AGRIMAPS Access Code exactly as it appears below. You will not need to use this code after youve completed the sign-up process. If you do not sign up before the expiration date, you must request a new code. · AGRIMAPS Access Code: [email or web address] Expires: 12/3/2018  8:34 AM 
 
4. Enter the last four digits of your Social Security Number (xxxx) and Date of Birth (mm/dd/yyyy) as indicated and click Submit. You will be taken to the next sign-up page. 5. Create a AGRIMAPS ID. This will be your AGRIMAPS login ID and cannot be changed, so think of one that is secure and easy to remember. 6. Create a AGRIMAPS password. You can change your password at any time. 7. Enter your Password Reset Question and Answer. This can be used at a later time if you forget your password. 8. Enter your e-mail address. You will receive e-mail notification when new information is available in [address] 9. Click Sign Up. You can now view and download portions of your medical record. 10. Click the Download Summary menu link to download a portable copy of your medical information. If you have questions, please visit the Frequently Asked Questions section of the AGRIMAPS website. Remember, AGRIMAPS is NOT to be used for urgent needs. For medical emergencies, dial 911. Now available from your iPhone and Android! Please provide this summary of care documentation to your next provider. Your primary care clinician is listed as MAURA GEIGER. If you have any questions after today's visit, please call [phone].

## 2018-09-04 NOTE — PROGRESS NOTES
1. Have you been to the ER, urgent care clinic since your last visit? Hospitalized since your last visit[facility]- about 3 months ago. 2. Have you seen or consulted any other health care providers outside of the [facility] since your last visit? Include any pap smears or colon screening. No  
 
Chief Complaint Patient presents with  Cholesterol Problem  
  follow up  Anxiety  
  follow up Fasting

## 2018-09-04 NOTE — PROGRESS NOTES
HISTORY OF PRESENT ILLNESS  
HPI Fasting follow up hyperlipidemia and anxiety doing well on Lexapro. She admits that since her last visit she has not been compliant w/ diet or exercise. Wt steadily going up. She had been opposed to trying another statin before but is really starting to worry about her health now and wants to try Crestor again bc she knows \"my cholesterol is going to be through the roof because I have not been doing what I am supposed to\". On her allergy list, it noted that Crestor caused ringing in the ears previously but she states she has that chronically anyway and would rather just be on the Crestor again. She otherwise tolerated it fine. REVIEW OF SYMPTOMS  
  Review of Systems Constitutional: Negative. Respiratory: Negative. Cardiovascular: Negative. Gastrointestinal: Negative. Psychiatric/Behavioral: Negative for depression.  
 
 
  
 PROBLEM LIST/MEDICAL HISTORY  
  
Problem List  Date Reviewed: 9/4/2018 Codes Class Noted Obesity, morbid ([facility] [address] ICD-10-CM: E66.01 
ICD-9-CM: 278.01  2/22/2018 Plantar fasciitis, bilateral ICD-10-CM: M72.2 ICD-9-CM: 728.71  2/22/2018 Overview Signed 2/22/2018 11:30 AM by Sadaf Barrientos MD  
  Podiatrist Dr Delia Trimble 1-2018, orthotics Panic attacks ICD-10-CM: F41.0 ICD-9-CM: 300.01  4/17/2017 Anxiety ICD-10-CM: F41.9 ICD-9-CM: 300.00  9/19/2016 Overview Signed 9/19/2016  2:26 PM by Sadaf Barrientos MD  
  0-0258 Postmenopause, LMP ~ 45 yo, 2013, No HRT ICD-10-CM: Z78.0 ICD-9-CM: V49.81  9/19/2016 Palpitations ICD-10-CM: R00.2 ICD-9-CM: 785.1  1/9/2015 Overview Signed 4/2/2015 12:40 PM by Asmita Kirk MD  
  1/16 event monitor, pac's otherwise unremarkable 
  
  
   
 SOB (shortness of breath) ICD-10-CM: R06.02 
ICD-9-CM: 786.05  1/9/2015 History of mitral valve disorder ICD-10-CM: Z86.79 
ICD-9-CM: V12.59  12/19/2013 Overview Signed 12/19/2013  3:57 PM by Tami March MD  
  In her late 19's; told it was \"benign\", no follow up Vitamin D deficiency ICD-10-CM: E55.9 ICD-9-CM: 268.9  8/20/2013 Overview Signed 8/20/2013  2:18 PM by Tami March MD  
  2011 Perimenopause ICD-10-CM: N95.1 ICD-9-CM: 627.2  8/20/2013 Overview Signed 8/20/2013  2:21 PM by Tami March MD  
  Since 2012 Scoliosis ICD-10-CM: M41.9 ICD-9-CM: 737.30  6/28/2013 Overview Signed 6/28/2013 10:18 AM by Tami March MD  
  Since childhood; no surgery or brace; PT only Depressive disorder, not elsewhere classified ICD-10-CM: F32.9 ICD-9-CM: 009  2/7/2011 Mixed hyperlipidemia ICD-10-CM: E78.2 ICD-9-CM: 272.2  2/7/2011 Esophageal reflux ICD-10-CM: K21.9 ICD-9-CM: 530.81  2/7/2011  
   
  
 
 
  PAST SURGICAL HISTORY  
   
Past Surgical History:  
Procedure Laterality Date  HX CYST REMOVAL  15 yo  
 
 
 
MEDICATIONS  
  
Current Outpatient Prescriptions Medication Sig  escitalopram oxalate (LEXAPRO) 10 mg tablet TAKE ONE TABLET BY MOUTH ONCE DAILY IN THE EVENING  
 aspirin delayed-release 81 mg tablet Take 81 mg by mouth daily. No current facility-administered medications for this visit. ALLERGIES Allergies Allergen Reactions  Bee Sting [Sting, Bee] Swelling  Crestor [Rosuvastatin] Other (comments) Ears ringing  Lipitor [Atorvastatin] Myalgia  Prozac [Fluoxetine] Other (comments) Dizziness and lack of coordination SOCIAL HISTORY  
   
Social History Social History  Marital status:  Spouse name: N/A  
 Number of children: 0  
 Years of education: N/A Occupational History   at the [employer] [address] Social History Main Topics  Smoking status: Never Smoker  Smokeless tobacco: Never Used  Alcohol use No  
 Drug use:  No  
  Sexual activity: Yes  
  Partners: Male Comment: Perimenopause and Rhythm Other Topics Concern  Caffeine Concern No  
  16 oz of half caff coffee, but eats alot of chocolate ~ 4-5 x a week  Weight Concern Yes  
  overweight  Special Diet No  
 Exercise Yes  
  tries to walk her puppy qd x 20 minutes but hurts due to plantar faciisits Social History Narrative  
 
  
IMMUNIZATIONS Immunization History Administered Date(s) Administered  Tdap 2013 FAMILY HISTORY Family History Problem Relation Age of Onset  Breast Cancer Mother   
  diagnosed in her 52's, survivor  Other Mother 64  
  heart arrhythmia  Cancer Father 61  
  lung  Diabetes Father   
  type 2  
 Hypertension Father  No Known Problems Sister  No Known Problems Brother  No Known Problems Brother  Breast Cancer Maternal Grandmother   
   in her late 55's-59's  Heart Attack Maternal Grandfather   
   in his mid [de-identified] from an MI  
 Other Paternal Grandmother   
   natural causes in old age  Other Paternal Grandfather   
   in car accident  Other Sister 50  
  heart arrhythmia  Breast Cancer Maternal Aunt Jessie Santiago Visit Vitals  /80 (BP 1 Location: Right arm, BP Patient Position: Sitting)  Pulse 77  Temp 98.5 °F (36.9 °C) (Oral)  Resp 16  
 Ht 5' 7\" (1.702 m)  Wt 287 lb 12.8 oz (130.5 kg)  LMP 2013  SpO2 97%  BMI 45.08 kg/m2 PHYSICAL EXAMINATION  
  Physical Exam  
Constitutional: No distress. Cardiovascular: Normal rate, regular rhythm and normal heart sounds. No murmur heard. Pulmonary/Chest: Effort normal and breath sounds normal. No respiratory distress. Psychiatric: Mood, affect and judgment normal.  
Vitals reviewed. 
 
 
  
 DIAGNOSTIC DATA  
 
  
 LABORATORY DATA  
   
Lab Results Component Value Date/Time Cholesterol, total 246 (H) 2018 11:56 AM  
 HDL Cholesterol 55 02/22/2018 11:56 AM  
LDL, calculated 156 (H) 02/22/2018 11:56 AM  
Triglyceride 177 (H) 02/22/2018 11:56 AM  
CHOL/HDL Ratio 3.8 01/14/2010 08:47 AM  
 
Lab Results Component Value Date/Time TSH 1.590 02/22/2018 11:56 AM  
  
Lab Results Component Value Date/Time Sodium 141 02/22/2018 11:56 AM  
 Potassium 4.1 02/22/2018 11:56 AM  
 Chloride 100 02/22/2018 11:56 AM  
 CO2 27 02/22/2018 11:56 AM  
 Anion gap 6 01/14/2010 08:47 AM  
 Glucose 91 02/22/2018 11:56 AM  
 BUN 11 02/22/2018 11:56 AM  
 Creatinine 0.81 02/22/2018 11:56 AM  
 BUN/Creatinine ratio 14 02/22/2018 11:56 AM  
 GFR est AA 95 02/22/2018 11:56 AM  
 GFR est non-AA 82 02/22/2018 11:56 AM  
 Calcium 9.3 02/22/2018 11:56 AM  
 Bilirubin, total 0.5 02/22/2018 11:56 AM  
 ALT (SGPT) 23 02/22/2018 11:56 AM  
 AST (SGOT) 20 02/22/2018 11:56 AM  
 Alk. phosphatase 71 02/22/2018 11:56 AM  
 Protein, total 7.1 02/22/2018 11:56 AM  
 Albumin 4.3 02/22/2018 11:56 AM  
 Globulin 2.9 01/14/2010 08:47 AM  
 A-G Ratio 1.5 02/22/2018 11:56 AM  
   
 
 ASSESSMENT & PLAN  
 
  ICD-10-CM ICD-9-CM 1. Mixed hyperlipidemia E78.2 272.2 rosuvastatin (CRESTOR) 5 mg tablet LIPID PANEL 2. Anxiety F41.9 300.00 Stable on Lexapro daily at this time Fasting labs today Cardiovascular risk and specific lipid/LDL goals reviewed Reviewed diet, nutrition, exercise, weight management, BMI/goals, age/risk based screening recommendations, health maintenance & prevention counseling. Reviewed medications and side effects in detail She admits that since her last visit she has not been compliant w/ diet or exercise. Wt steadily going up. She had been opposed to trying another statin before but is really starting to worry about her health now and wants to try Crestor again bc she knows \"my cholesterol is going to be through the roof because I have not been doing what I am supposed to\". On her allergy list, it noted that Crestor caused ringing in the ears previously but she states she has that chronically anyway and would rather just be on the Crestor again. She otherwise tolerated it fine. I sent low dose Crestor 5 mg to her pharm on file.  
Fasting follow up 3months.

## 2018-09-04 NOTE — PATIENT INSTRUCTIONS
High Cholesterol: Care Instructions Your Care Instructions Cholesterol is a type of fat in your blood. It is needed for many body functions, such as making new cells. Cholesterol is made by your body. It also comes from food you eat. High cholesterol means that you have too much of the fat in your blood. This raises your risk of a heart attack and stroke. LDL and HDL are part of your total cholesterol. LDL is the \"bad\" cholesterol. High LDL can raise your risk for heart disease, heart attack, and stroke. HDL is the \"good\" cholesterol. It helps clear bad cholesterol from the body. High HDL is linked with a lower risk of heart disease, heart attack, and stroke. Your cholesterol levels help your doctor find out your risk for having a heart attack or stroke. You and your doctor can talk about whether you need to lower your risk and what treatment is best for you. A heart-healthy lifestyle along with medicines can help lower your cholesterol and your risk. The way you choose to lower your risk will depend on how high your risk is for heart attack and stroke. It will also depend on how you feel about taking medicines. Follow-up care is a key part of your treatment and safety. Be sure to make and go to all appointments, and call your doctor if you are having problems. It's also a good idea to know your test results and keep a list of the medicines you take. How can you care for yourself at home? · Eat a variety of foods every day. Good choices include fruits, vegetables, whole grains (like oatmeal), dried beans and peas, nuts and seeds, soy products (like tofu), and fat-free or low-fat dairy products. · Replace butter, margarine, and hydrogenated or partially hydrogenated oils with olive and canola oils. (Canola oil margarine without trans fat is fine.) · Replace red meat with fish, poultry, and soy protein (like tofu). · Limit processed and packaged foods like chips, crackers, and cookies. · Bake, broil, or steam foods. Don't robledo them. · Be physically active. Get at least 30 minutes of exercise on most days of the week. Walking is a good choice. You also may want to do other activities, such as running, swimming, cycling, or playing tennis or team sports. · Stay at a healthy weight or lose weight by making the changes in eating and physical activity listed above. Losing just a small amount of weight, even 5 to 10 pounds, can reduce your risk for having a heart attack or stroke. · Do not smoke. When should you call for help? Watch closely for changes in your health, and be sure to contact your doctor if: 
  · You need help making lifestyle changes.  
  · You have questions about your medicine. Where can you learn more? Go to http://rosa-susan.info/. Enter S218 in the search box to learn more about \"High Cholesterol: Care Instructions. \" Current as of: May 10, 2017 Content Version: 11.7 © 5794-3411 [employer]. Care instructions adapted under license by [employer] (which disclaims liability or warranty for this information). If you have questions about a medical condition or this instruction, always ask your healthcare professional. [address] any warranty or liability for your use of this information. Body Mass Index: Care Instructions Your Care Instructions Body mass index (BMI) can help you see if your weight is raising your risk for health problems. It uses a formula to compare how much you weigh with how tall you are. · A BMI lower than 18.5 is considered underweight. · A BMI between 18.5 and 24.9 is considered healthy. · A BMI between 25 and 29.9 is considered overweight. A BMI of 30 or higher is considered obese. If your BMI is in the normal range, it means that you have a lower risk for weight-related health problems.  If your BMI is in the overweight or obese range, you may be at increased risk for weight-related health problems, such as high blood pressure, heart disease, stroke, arthritis or joint pain, and diabetes. If your BMI is in the underweight range, you may be at increased risk for health problems such as fatigue, lower protection (immunity) against illness, muscle loss, bone loss, hair loss, and hormone problems. BMI is just one measure of your risk for weight-related health problems. You may be at higher risk for health problems if you are not active, you eat an unhealthy diet, or you drink too much alcohol or use tobacco products. Follow-up care is a key part of your treatment and safety. Be sure to make and go to all appointments, and call your doctor if you are having problems. It's also a good idea to know your test results and keep a list of the medicines you take. How can you care for yourself at home? · Practice healthy eating habits. This includes eating plenty of fruits, vegetables, whole grains, lean protein, and low-fat dairy. · If your doctor recommends it, get more exercise. Walking is a good choice. Bit by bit, increase the amount you walk every day. Try for at least 30 minutes on most days of the week. · Do not smoke. Smoking can increase your risk for health problems. If you need help quitting, talk to your doctor about stop-smoking programs and medicines. These can increase your chances of quitting for good. · Limit alcohol to 2 drinks a day for men and 1 drink a day for women. Too much alcohol can cause health problems. If you have a BMI higher than 25 · Your doctor may do other tests to check your risk for weight-related health problems. This may include measuring the distance around your waist. A waist measurement of more than 40 inches in men or 35 inches in women can increase the risk of weight-related health problems.  
· Talk with your doctor about steps you can take to stay healthy or improve your health. You may need to make lifestyle changes to lose weight and stay healthy, such as changing your diet and getting regular exercise. If you have a BMI lower than 18.5 · Your doctor may do other tests to check your risk for health problems. · Talk with your doctor about steps you can take to stay healthy or improve your health. You may need to make lifestyle changes to gain or maintain weight and stay healthy, such as getting more healthy foods in your diet and doing exercises to build muscle. Where can you learn more? Go to http://rosa-susan.info/. Enter S176 in the search box to learn more about \"Body Mass Index: Care Instructions. \" Current as of: October 13, 2016 Content Version: 11.4 © 1133-8656 [employer]. Care instructions adapted under license by [facility] (which disclaims liability or warranty for this information). If you have questions about a medical condition or this instruction, always ask your healthcare professional. [address] any warranty or liability for your use of this information.

## 2018-09-05 LAB
CHOLEST SERPL-MCNC: 210 MG/DL (ref 100–199)
HDLC SERPL-MCNC: 56 MG/DL
INTERPRETATION, 910389: NORMAL
LDLC SERPL CALC-MCNC: 132 MG/DL (ref 0–99)
TRIGL SERPL-MCNC: 108 MG/DL (ref 0–149)
VLDLC SERPL CALC-MCNC: 22 MG/DL (ref 5–40)

## 2018-09-17 ENCOUNTER — TELEPHONE (OUTPATIENT)
Dept: FAMILY MEDICINE CLINIC | Age: 53
End: 2018-09-17

## 2018-09-17 NOTE — TELEPHONE ENCOUNTER
Component Value Flag Ref Range Units Status   Cholesterol, total 210 (H) 100 - 199 mg/dL Final   Triglyceride 108  0 - 149 mg/dL Final   HDL Cholesterol 56  >39 mg/dL Final   VLDL, calculated 22  5 - 40 mg/dL Final   LDL, calculated 132 (H)

## 2018-09-18 NOTE — TELEPHONE ENCOUNTER
TG has improved nicely from 177 to 108 and LDL has improved nicely from 156 to 132 while HDL remains very good and at goal at 56 (>50)    That is great news! However, it comes as a bit of a surprise to us both because she said she has not been healthy lately. So we were being proactive at her last appt assuming her numbers would worsen and she opted to go back on Crestor again but at a low dose. This should still make a positive impact on her overall lipid profile and lowering her risk. Especially if she adheres to diet/exercise/wt loss along with it. Will reassess at her fasting follow up 3months which she already has set for 12-5-18 since she is back on Crestor now. Can mail copy of labs to pt.

## 2018-11-18 ENCOUNTER — TELEPHONE (OUTPATIENT)
Dept: FAMILY MEDICINE CLINIC | Age: 53
End: 2018-11-18

## 2018-11-18 DIAGNOSIS — E78.2 MIXED HYPERLIPIDEMIA: ICD-10-CM

## 2018-11-18 RX ORDER — ROSUVASTATIN CALCIUM 5 MG/1
TABLET, COATED ORAL
Qty: 30 TAB | Refills: 0 | Status: SHIPPED | OUTPATIENT
Start: 2018-11-18 | End: 2018-11-25 | Stop reason: SINTOL

## 2018-11-19 NOTE — TELEPHONE ENCOUNTER
Patient is requesting a different medication then rosuvastatin (CRESTOR) 5 mg tablet [[id number]    As she is having some side effects, muscle aches and joint pain since 3 months when she started the medication.   Best call back : [phone]  Pharmacy verified

## 2018-11-20 NOTE — TELEPHONE ENCOUNTER
Patient is requesting a status of her medication request  requesting a different medication be sent to pharmacy today  Best call back : [phone]

## 2018-11-20 NOTE — TELEPHONE ENCOUNTER
LM that I have sent request to Dr Estefanía Mayes and will get back with her once I hear from Dr Estefanía Mayes.

## 2018-11-26 RX ORDER — PRAVASTATIN SODIUM 20 MG/1
20 TABLET ORAL
Qty: 90 TAB | Refills: 0 | Status: SHIPPED | OUTPATIENT
Start: 2018-11-26 | End: 2019-02-22

## 2018-11-26 NOTE — TELEPHONE ENCOUNTER
PI of Dr Yuri Joshua note. She would like to try the pravastatin. Confirmed pharmacy. 3 month fasting f/u 2/22/19@ 8:00.

## 2018-11-26 NOTE — TELEPHONE ENCOUNTER
Lee Loges so back at her appt in Sept this is what I summarized:    She had been opposed to trying another statin before but is really starting to worry about her health now and wants to try Crestor again bc she knows \"my cholesterol is going to be through the roof because I have not been doing what I am supposed to\". On her allergy list, it noted that Crestor caused ringing in the ears previously but she states she has that chronically anyway and would rather just be on the Crestor again. She otherwise tolerated it fine. So I sent low dose Crestor 5 mg to her pharm on file. So now I am assuming she is not tolerating the low dose Crestor which is a new side effect that she wasn't even having on the higher dose before. She didn't tolerate Lipitor previously either. She is scheduled to see me next week on 12-5-18 because we had that set up as a 3month fasting follow up after being on the low dose Crestor 5mg. So now does she want to push that back 3 months after starting on the new option? I will try her on Pravachol which is a much weaker/milder statin since she hasnt been able to tolerate the 2 better ones (Crestor or Lipitor). So the Pravachol may not work as well so she will have to still work really hard on diet and exercise. (I added the new side effect to Crestor)    After r/w pt, route back so I can send in Pravachol 20 mg and set fasting follow up 3 months from then.

## 2018-12-18 DIAGNOSIS — E78.2 MIXED HYPERLIPIDEMIA: ICD-10-CM

## 2018-12-20 RX ORDER — ROSUVASTATIN CALCIUM 5 MG/1
TABLET, COATED ORAL
Qty: 30 TAB | Refills: 0 | OUTPATIENT
Start: 2018-12-20

## 2018-12-21 NOTE — TELEPHONE ENCOUNTER
Future Appointments   Date Time Provider Mio Domingo   2/22/2019  8:00 AM Sushila Monroe MD [facility] Marybel Randhawa

## 2018-12-21 NOTE — TELEPHONE ENCOUNTER
PLEASE CALL THE PHARMACY AND ASK THEM TO CANCEL THE CRESTOR.  I KEEP REFUSING IT AND PUTTING NOTES ON THEIR TO THEM THAT PATIENT IS NO LONGER TAKING THIS MEDICATION, YET THEY KEEP SENDING THE REQUESTS AGAIN

## 2019-02-22 ENCOUNTER — OFFICE VISIT (OUTPATIENT)
Dept: FAMILY MEDICINE CLINIC | Age: 54
End: 2019-02-22

## 2019-02-22 VITALS
HEART RATE: 73 BPM | WEIGHT: 284 LBS | OXYGEN SATURATION: 98 % | BODY MASS INDEX: 44.57 KG/M2 | DIASTOLIC BLOOD PRESSURE: 76 MMHG | RESPIRATION RATE: 18 BRPM | HEIGHT: 67 IN | SYSTOLIC BLOOD PRESSURE: 114 MMHG | TEMPERATURE: 97.6 F

## 2019-02-22 DIAGNOSIS — E78.2 MIXED HYPERLIPIDEMIA: Primary | ICD-10-CM

## 2019-02-22 DIAGNOSIS — F41.8 DEPRESSION WITH ANXIETY: ICD-10-CM

## 2019-02-22 DIAGNOSIS — E66.01 OBESITY, MORBID (HCC): ICD-10-CM

## 2019-02-22 DIAGNOSIS — Z12.11 SCREENING FOR COLORECTAL CANCER: ICD-10-CM

## 2019-02-22 DIAGNOSIS — E55.9 VITAMIN D DEFICIENCY: ICD-10-CM

## 2019-02-22 DIAGNOSIS — Z12.31 ENCOUNTER FOR SCREENING MAMMOGRAM FOR BREAST CANCER: ICD-10-CM

## 2019-02-22 DIAGNOSIS — I48.0 PAF (PAROXYSMAL ATRIAL FIBRILLATION) (HCC): ICD-10-CM

## 2019-02-22 DIAGNOSIS — Z12.12 SCREENING FOR COLORECTAL CANCER: ICD-10-CM

## 2019-02-22 PROBLEM — Z53.20 COLONOSCOPY REFUSED: Status: ACTIVE | Noted: 2019-02-22

## 2019-02-22 RX ORDER — ATORVASTATIN CALCIUM 20 MG/1
20 TABLET, FILM COATED ORAL DAILY
COMMUNITY
Start: 2019-01-28

## 2019-02-22 RX ORDER — DILTIAZEM HYDROCHLORIDE 120 MG/1
120 CAPSULE, EXTENDED RELEASE ORAL DAILY
COMMUNITY
Start: 2019-01-26 | End: 2022-06-01 | Stop reason: ALTCHOICE

## 2019-02-22 NOTE — PROGRESS NOTES
Chief Complaint   Patient presents with    Cholesterol Problem     fasting follow up        1. Have you been to the ER, urgent care clinic since your last visit? Hospitalized since your last visit? Yes [facility] in 10/2018    2. Have you seen or consulted any other health care providers outside of the [address] [place] [address] since your last visit? Include any pap smears or colon screening.    Yes see above   Dr Jeremiah Perez NP-Cardiology

## 2019-02-22 NOTE — PATIENT INSTRUCTIONS

## 2019-02-22 NOTE — PROGRESS NOTES
Chief Complaint   Patient presents with    Cholesterol Problem     fasting follow up     Medication Evaluation     cholesterol medication    Depression     follow up       [facility]   HPI  Patient presents for follow up and/or evaluation/assessment of conditions outlined in A/P below. Hyperlipidemia  -Diet/Exercise: Noncompliant w/ both  -Compliant with medications: Yes  -Medication side effects: Cardiologist changed her from Crestor to Lipitor 1 month ago due to bad cramping. She is still having some mild leg cramps but not as bad as w/ the Crestor. She is taking CoQ10 daily now as well. -CV Symptoms: Refer to pertinent +/- in ROS below    PAF  Back in the fall pt went to [facility] for persistent heart palpitations. She was found to be in A Fib but did not require cardioversion. She has been followed by cardiology Dr Lynda Delcid since that time. Her palpitations, heart rates and BP's have all been well controlled since he started her on Diltiazem back in the fall. She is tolerating it well. He also started her on Eliquis. Depression/Anxiety  Continues on Lexapro and her mood has overall been pretty good and stable. Her biggest frustration is her constant lack of motivation to want to exercise or eat right. Feels she should be more motivated about taking better care of herself and working on getting her wt down. She would be interested in meeting w/ a counselor and dietician. REVIEW OF SYMPTOMS     Review of Systems   Constitutional: Negative. Eyes: Negative. Respiratory: Negative. Cardiovascular: Negative. Negative for chest pain, palpitations and claudication. Gastrointestinal: Negative. Negative for abdominal pain, blood in stool, constipation, diarrhea, nausea and vomiting. Genitourinary: Negative. Neurological: Negative.     Psychiatric/Behavioral: The patient is not nervous/anxious.            PROBLEM LIST/MEDICAL HISTORY      Problem List  Date Reviewed: 2/22/2019          Codes Class Noted    PAF (paroxysmal atrial fibrillation) ([facility] ICD-10-CM: I48.0  ICD-9-CM: 427.31  2/22/2019    Overview Signed 2/22/2019  8:28 AM by MD Samuel Metzger Dr,              Depression with anxiety ICD-10-CM: F41.8  ICD-9-CM: 300.4  2/22/2019        Obesity, morbid ([facility] ICD-10-CM: E66.01  ICD-9-CM: 278.01  2/22/2018        Plantar fasciitis, bilateral ICD-10-CM: M72.2  ICD-9-CM: 728.71  2/22/2018    Overview Signed 2/22/2018 11:30 AM by Citlalli Gomez MD     Podiatrist Dr Michael Christopher 1-2018, orthotics             Panic attacks ICD-10-CM: F41.0  ICD-9-CM: 300.01  4/17/2017        Anxiety ICD-10-CM: F41.9  ICD-9-CM: 300.00  9/19/2016    Overview Signed 9/19/2016  2:26 PM by Citlalli Gomez MD     [id number]             Postmenopause, LMP ~ 45 yo, 2013, No HRT ICD-10-CM: Z78.0  ICD-9-CM: V49.81  9/19/2016        Palpitations ICD-10-CM: R00.2  ICD-9-CM: 785.1  1/9/2015    Overview Signed 4/2/2015 12:40 PM by Peterson Zaragoza MD     1/16 event monitor, pac's otherwise unremarkable             SOB (shortness of breath) ICD-10-CM: R06.02  ICD-9-CM: 786.05  1/9/2015        History of mitral valve disorder ICD-10-CM: Z86.79  ICD-9-CM: V12.59  12/19/2013    Overview Signed 12/19/2013  3:57 PM by Citlalli Gomez MD     In her late 19's; told it was \"benign\", no follow up             Vitamin D deficiency ICD-10-CM: E55.9  ICD-9-CM: 268.9  8/20/2013    Overview Signed 8/20/2013  2:18 PM by Citlalli Gomez MD     2011             Perimenopause ICD-10-CM: N95.1  ICD-9-CM: 627.2  8/20/2013    Overview Signed 8/20/2013  2:21 PM by Citlalli Gomez MD     Since 2012             Scoliosis ICD-10-CM: M41.9  ICD-9-CM: 737.30  6/28/2013    Overview Signed 6/28/2013 10:18 AM by Citlalli Gomez MD     Since childhood; no surgery or brace; PT only             Mixed hyperlipidemia ICD-10-CM: E78.2  ICD-9-CM: 272.2  2/7/2011 Esophageal reflux ICD-10-CM: K21.9  ICD-9-CM: 530.81  2/7/2011                  PAST SURGICAL HISTORY       Past Surgical History:   Procedure Laterality Date    HX CYST REMOVAL  13 yo         MEDICATIONS      Current Outpatient Medications   Medication Sig    apixaban (ELIQUIS) 5 mg tablet Take 5 mg by mouth two (2) times a day.  atorvastatin (LIPITOR) 20 mg tablet     CARTIA  mg ER capsule Take 120 mg by mouth daily.  COQ10, UBIQUINOL, PO Take  by mouth daily.  escitalopram oxalate (LEXAPRO) 10 mg tablet TAKE ONE TABLET BY MOUTH ONCE DAILY IN THE EVENING     No current facility-administered medications for this visit.            ALLERGIES     Allergies   Allergen Reactions    Bee Sting [Sting, Bee] Swelling    Crestor [Rosuvastatin] Other (comments)     Worsening ringing in ears, muscle aches, joint pain, tried again 2018, myalgias    Lipitor [Atorvastatin] Myalgia    Prozac [Fluoxetine] Other (comments)     Dizziness and lack of coordination          SOCIAL HISTORY      Social History     Socioeconomic History    Marital status:      Spouse name: Not on file    Number of children: 0    Years of education: Not on file    Highest education level: Not on file   Occupational History    Occupation:  at the [employer]: [employer]   Tobacco Use    Smoking status: Never Smoker    Smokeless tobacco: Never Used   Substance and Sexual Activity    Alcohol use: No    Drug use: No    Sexual activity: Yes     Partners: Male     Comment: Perimenopause and Rhythm   Other Topics Concern    Caffeine Concern No     Comment: 16 oz of half caff coffee, but eats alot of chocolate ~ 4-5 x a week    Weight Concern Yes     Comment: overweight    Special Diet No    Exercise No     Comment: nothing regular but walks the dog a few x a week x 15-20 minutes        IMMUNIZATIONS  Immunization History   Administered Date(s) Administered    Tdap 08/20/2013         FAMILY HISTORY     Family History   Problem Relation Age of Onset   Jillian Lee Breast Cancer Mother         diagnosed in her 52's, survivor    Other Mother 64        heart arrhythmia    Heart Failure Mother     Cancer Father 61        lung    Diabetes Father         type 2    Hypertension Father     No Known Problems Sister     No Known Problems Brother     No Known Problems Brother     Breast Cancer Maternal Grandmother          in her late 55's-59's    Heart Attack Maternal Grandfather          in his mid [de-identified] from an MI    Other Paternal Uday Forts          natural causes in old age   Jillian Lee Other Paternal Grandfather          in car accident    Other Sister 50        heart arrhythmia    Breast Cancer Maternal Aunt          VITALS     Visit Vitals  /76 (BP 1 Location: Left arm, BP Patient Position: Sitting)   Pulse 73   Temp 97.6 °F (36.4 °C) (Oral)   Resp 18   Ht 5' 7\" (1.702 m)   Wt 284 lb (128.8 kg)   LMP 2013   SpO2 98%   BMI 44.48 kg/m²          PHYSICAL EXAMINATION     Physical Exam   Constitutional: No distress. Neck: Neck supple. Carotid bruit is not present. No thyromegaly present. Cardiovascular: Normal rate, regular rhythm and normal heart sounds. Pulmonary/Chest: Effort normal and breath sounds normal.   Abdominal: Soft. Bowel sounds are normal. She exhibits no distension and no mass. There is no tenderness. Musculoskeletal: She exhibits no edema or tenderness.    Psychiatric: Mood and affect normal.   Happy, cheerful, upbeat   Vitals reviewed.          DIAGNOSTIC DATA         LABORATORY DATA     Results for orders placed or performed in visit on 18   LIPID PANEL   Result Value Ref Range    Cholesterol, total 210 (H) 100 - 199 mg/dL    Triglyceride 108 0 - 149 mg/dL    HDL Cholesterol 56 >39 mg/dL    VLDL, calculated 22 5 - 40 mg/dL    LDL, calculated 132 (H) 0 - 99 mg/dL   CVD REPORT   Result Value Ref Range    INTERPRETATION Note             ASSESSMENT & PLAN ICD-10-CM ICD-9-CM    1. Mixed hyperlipidemia E78.2 272.2 CBC W/O DIFF      LIPID PANEL      METABOLIC PANEL, COMPREHENSIVE      TSH 3RD GENERATION   2. PAF (paroxysmal atrial fibrillation) ([facility]) I48.0 427.31    3. Vitamin D deficiency E55.9 268.9 VITAMIN D, 25 HYDROXY   4. Obesity, morbid ([facility]) E66.01 278.01 REFERRAL TO NUTRITION   5. Depression with anxiety F41.8 300.4    6. Screening for colorectal cancer: Patient refuses colonoscopy. Insurance would not cover Cologuard. Amenable to FOBT. Card and order given. Z12.11 V76.51 OCCULT BLOOD IMMUNOASSAY,DIAGNOSTIC    Z12.12     7. Encounter for breast cancer screening mammogram, order added at the                          end of today's visit      Fasting labs today  Cardiovascular risk and specific lipid/LDL goals reviewed  Reviewed medications and side effects in detail  She has been under the care of Cardio Dr Viv Patton since last fall for PAF. She is currently stable on Diltiazem and Eliquis. He changed her from Crestor to Lipitor 1 month ago due to bad cramping. She is still having some mild leg cramps but not as bad as w/ the Crestor. She is taking CoQ10 daily now as well. Will fax copy of labs to Dr. Viv Patton when available. Referred to Dietician for nutrition counseling, meal planning. Referred to counselor for depression in addition to maintaining current regimen of Lexapro for now. Further follow up & other recommendations pending review of labs.  If all remains good and stable, follow up in 6 months, sooner prn

## 2019-02-23 LAB
25(OH)D3+25(OH)D2 SERPL-MCNC: 17.9 NG/ML (ref 30–100)
ALBUMIN SERPL-MCNC: 4.4 G/DL (ref 3.5–5.5)
ALBUMIN/GLOB SERPL: 2 {RATIO} (ref 1.2–2.2)
ALP SERPL-CCNC: 71 IU/L (ref 39–117)
ALT SERPL-CCNC: 20 IU/L (ref 0–32)
AST SERPL-CCNC: 18 IU/L (ref 0–40)
BILIRUB SERPL-MCNC: 0.6 MG/DL (ref 0–1.2)
BUN SERPL-MCNC: 11 MG/DL (ref 6–24)
BUN/CREAT SERPL: 14 (ref 9–23)
CALCIUM SERPL-MCNC: 9.3 MG/DL (ref 8.7–10.2)
CHLORIDE SERPL-SCNC: 103 MMOL/L (ref 96–106)
CHOLEST SERPL-MCNC: 142 MG/DL (ref 100–199)
CO2 SERPL-SCNC: 24 MMOL/L (ref 20–29)
CREAT SERPL-MCNC: 0.81 MG/DL (ref 0.57–1)
ERYTHROCYTE [DISTWIDTH] IN BLOOD BY AUTOMATED COUNT: 13.9 % (ref 12.3–15.4)
GLOBULIN SER CALC-MCNC: 2.2 G/DL (ref 1.5–4.5)
GLUCOSE SERPL-MCNC: 97 MG/DL (ref 65–99)
HCT VFR BLD AUTO: 39.4 % (ref 34–46.6)
HDLC SERPL-MCNC: 53 MG/DL
HGB BLD-MCNC: 13.1 G/DL (ref 11.1–15.9)
INTERPRETATION, 910389: NORMAL
LDLC SERPL CALC-MCNC: 71 MG/DL (ref 0–99)
MCH RBC QN AUTO: 30.1 PG (ref 26.6–33)
MCHC RBC AUTO-ENTMCNC: 33.2 G/DL (ref 31.5–35.7)
MCV RBC AUTO: 91 FL (ref 79–97)
PLATELET # BLD AUTO: 221 X10E3/UL (ref 150–379)
POTASSIUM SERPL-SCNC: 4.5 MMOL/L (ref 3.5–5.2)
PROT SERPL-MCNC: 6.6 G/DL (ref 6–8.5)
RBC # BLD AUTO: 4.35 X10E6/UL (ref 3.77–5.28)
SODIUM SERPL-SCNC: 142 MMOL/L (ref 134–144)
TRIGL SERPL-MCNC: 92 MG/DL (ref 0–149)
TSH SERPL DL<=0.005 MIU/L-ACNC: 1.22 UIU/ML (ref 0.45–4.5)
VLDLC SERPL CALC-MCNC: 18 MG/DL (ref 5–40)
WBC # BLD AUTO: 4.1 X10E3/UL (ref 3.4–10.8)

## 2019-02-28 LAB — HEMOCCULT STL QL IA: NEGATIVE

## 2019-03-05 ENCOUNTER — HOSPITAL ENCOUNTER (OUTPATIENT)
Dept: MAMMOGRAPHY | Age: 54
Discharge: HOME | End: 2019-03-05
Attending: FAMILY MEDICINE
Payer: COMMERCIAL

## 2019-03-05 DIAGNOSIS — Z12.31 ENCOUNTER FOR SCREENING MAMMOGRAM FOR BREAST CANCER: ICD-10-CM

## 2019-03-05 PROCEDURE — 77067 SCR MAMMO BI INCL CAD: CPT

## 2019-03-12 ENCOUNTER — TELEPHONE (OUTPATIENT)
Dept: FAMILY MEDICINE CLINIC | Age: 54
End: 2019-03-12

## 2019-03-12 NOTE — TELEPHONE ENCOUNTER
Pt. is calling requesting a call back in regards to her lab results from two weeks ago.      Best call # [phone]

## 2019-03-14 NOTE — TELEPHONE ENCOUNTER
Hemoccult negative  Blood counts, liver, kidney, thyroid all good and normal  Fasting BS good and normal, non diabetes range  LDL has improved VERY nicely and is the best reading she has gotten in years and is finally at goal! [employer] job! And the HDL remains very good and at goal.  Please fax copy to cardio Dr. Jimbo Ricci. Cont same meds  Vit D still low and worse than last check, lowest it has been. According to chart she is not taking any vit D.  I rec 1,000 to 2,000 units daily in addition to working on getting through dietary sources.   Next fasting annual check in one year but follow up sooner in a few months for depression to make sure she is doing ok, rtc sooner prn

## 2019-05-14 RX ORDER — ESCITALOPRAM OXALATE 10 MG/1
TABLET ORAL
Qty: 90 TAB | Refills: 3 | Status: SHIPPED | OUTPATIENT
Start: 2019-05-14 | End: 2020-05-18

## 2019-10-17 ENCOUNTER — OFFICE VISIT (OUTPATIENT)
Dept: FAMILY MEDICINE CLINIC | Age: 54
End: 2019-10-17

## 2019-10-17 VITALS
OXYGEN SATURATION: 97 % | HEIGHT: 67 IN | RESPIRATION RATE: 18 BRPM | TEMPERATURE: 97.8 F | WEIGHT: 293 LBS | BODY MASS INDEX: 45.99 KG/M2 | HEART RATE: 80 BPM | SYSTOLIC BLOOD PRESSURE: 125 MMHG | DIASTOLIC BLOOD PRESSURE: 75 MMHG

## 2019-10-17 DIAGNOSIS — F41.8 DEPRESSION WITH ANXIETY: Primary | ICD-10-CM

## 2019-10-17 PROBLEM — I87.2 VENOUS INSUFFICIENCY OF BOTH LOWER EXTREMITIES: Status: ACTIVE | Noted: 2019-10-17

## 2019-10-17 RX ORDER — BUPROPION HYDROCHLORIDE 150 MG/1
150 TABLET ORAL
Qty: 30 TAB | Refills: 0 | Status: SHIPPED | OUTPATIENT
Start: 2019-10-17 | End: 2019-11-14 | Stop reason: SDUPTHER

## 2019-10-17 NOTE — PATIENT INSTRUCTIONS

## 2019-10-17 NOTE — PROGRESS NOTES
Chief Complaint   Patient presents with    Depression     x 6 months     HISTORY OF PRESENT ILLNESS   HPI  Patient with longstanding h/o chronic depression mixed w/ anxiety. Currently on Lexapro 10 mg daily. This has continued to work well at controlling her overall levels of generalized anxiety but the past 6 months she has been feeling more sad and depressed. Worrying more in general about her health. Some work stress. Worried about her mother's health. Feeling overwhelmed. Feels in a slump. Lack of interest in things. Very apathetic. Unmotivated. Decreased self care. Stress eating. Gaining wt. Tired. More sedentary. Sleep patterns fluctuate. Hard time focusing and concentrating. \"I want to care again\". She used to be on Wellbutrin in the past which worked well but later transitioned to Lexapro alone more so for anxiety which is still helping that. Tolerated Wellbutrin well in the past.      REVIEW OF SYMPTOMS   Review of Systems   Respiratory: Negative. Cardiovascular: Negative. Gastrointestinal: Negative. Neurological: Negative. Psychiatric/Behavioral: Positive for depression. Negative for suicidal ideas.            PROBLEM LIST/MEDICAL HISTORY     Problem List  Date Reviewed: 10/17/2019          Codes Class Noted    Venous insufficiency of both lower extremities ICD-10-CM: I87.2  ICD-9-CM: 459.81  10/17/2019    Overview Signed 10/17/2019  8:47 AM by Dudley Rodriguez MD     Vascular Dr. Lindsay Mckinney             PAF (paroxysmal atrial fibrillation) [facility]) ICD-10-CM: I48.0  ICD-9-CM: 427.31  2/22/2019    Overview Signed 2/22/2019  8:28 AM by Dudley Rodriguez MD     [facility] Dr Dudley Atkinson,              Depression with anxiety ICD-10-CM: F41.8  ICD-9-CM: 300.4  2/22/2019        Colonoscopy refused ICD-10-CM: Z53.20  ICD-9-CM: V64.2  2/22/2019        Obesity, morbid ([facility] ICD-10-CM: E66.01  ICD-9-CM: 278.01  2/22/2018        Plantar fasciitis, bilateral ICD-10-CM: M72.2  ICD-9-CM: 728.71  2/22/2018    Overview Signed 2/22/2018 11:30 AM by Homa Bangura MD     Podiatrist Dr Lisbet Rivera 1-2018, orthotics             Panic attacks ICD-10-CM: F41.0  ICD-9-CM: 300.01  4/17/2017        Anxiety ICD-10-CM: F41.9  ICD-9-CM: 300.00  9/19/2016    Overview Signed 9/19/2016  2:26 PM by Homa Bangura MD     [phone]             Postmenopause, LMP ~ 47 yo, 2013, No HRT ICD-10-CM: Z78.0  ICD-9-CM: V49.81  9/19/2016        Palpitations ICD-10-CM: R00.2  ICD-9-CM: 785.1  1/9/2015    Overview Signed 4/2/2015 12:40 PM by Sea Lopez MD     1/16 event monitor, pac's otherwise unremarkable             SOB (shortness of breath) ICD-10-CM: R06.02  ICD-9-CM: 786.05  1/9/2015        History of mitral valve disorder ICD-10-CM: Z86.79  ICD-9-CM: V12.59  12/19/2013    Overview Signed 12/19/2013  3:57 PM by Homa Bangura MD     In her late 17's; told it was \"benign\", no follow up             Vitamin D deficiency ICD-10-CM: E55.9  ICD-9-CM: 268.9  8/20/2013    Overview Signed 8/20/2013  2:18 PM by Homa Bangura MD     2011             Perimenopause ICD-10-CM: N95.1  ICD-9-CM: 627.2  8/20/2013    Overview Signed 8/20/2013  2:21 PM by Hoam Bangura MD     Since 2012             Scoliosis ICD-10-CM: M41.9  ICD-9-CM: 737.30  6/28/2013    Overview Signed 6/28/2013 10:18 AM by Homa Bangura MD     Since childhood; no surgery or brace; PT only             Mixed hyperlipidemia ICD-10-CM: E78.2  ICD-9-CM: 272.2  2/7/2011        Esophageal reflux ICD-10-CM: K21.9  ICD-9-CM: 530.81  2/7/2011                  PAST SURGICAL HISTORY     Past Surgical History:   Procedure Laterality Date    HX CHOLECYSTECTOMY  2019    HX COLONOSCOPY      REFUSES    HX CYST REMOVAL  13 yo         MEDICATIONS     Current Outpatient Medications   Medication Sig    escitalopram oxalate (LEXAPRO) 10 mg tablet TAKE 1 TABLET BY MOUTH ONCE DAILY IN THE EVENING    apixaban (ELIQUIS) 5 mg tablet Take 5 mg by mouth two (2) times a day.  atorvastatin (LIPITOR) 20 mg tablet     CARTIA  mg ER capsule Take 120 mg by mouth daily.  COQ10, UBIQUINOL, PO Take  by mouth daily. No current facility-administered medications for this visit.            ALLERGIES     Allergies   Allergen Reactions    Bee Sting [Sting, Bee] Swelling    Crestor [Rosuvastatin] Other (comments)     Worsening ringing in ears, muscle aches, joint pain, tried again 2018, myalgias    Lipitor [Atorvastatin] Myalgia    Prozac [Fluoxetine] Other (comments)     Dizziness and lack of coordination          SOCIAL HISTORY     Social History     Socioeconomic History    Marital status:      Spouse name: Not on file    Number of children: 0    Years of education: Not on file    Highest education level: Not on file   Occupational History    Occupation:  at the [place]: [employer]   Tobacco Use    Smoking status: Never Smoker    Smokeless tobacco: Never Used   Substance and Sexual Activity    Alcohol use: No    Drug use: No    Sexual activity: Yes     Partners: Male     Comment: Perimenopause and Rhythm   Other Topics Concern    Caffeine Concern No     Comment: 16 oz of half caff coffee, but eats alot of chocolate ~ 4-5 x a week    Weight Concern Yes     Comment: overweight    Special Diet No    Exercise No     Comment: nothing regular but walks the dog a few x a week x 15-20 minutes        IMMUNIZATIONS  Immunization History   Administered Date(s) Administered    Tdap 08/20/2013         FAMILY HISTORY     Family History   Problem Relation Age of Onset    Breast Cancer Mother         diagnosed in her 52's, survivor    Other Mother 64        heart arrhythmia    Heart Failure Mother     Cancer Father 61        lung    Diabetes Father         type 2    Hypertension Father     No Known Problems Sister     No Known Problems Brother     No Known Problems Brother     Breast Cancer Maternal Grandmother          in her late 55's-59's    Heart Attack Maternal Grandfather          in his mid [de-identified] from an MI    Other Paternal Grandmother          natural causes in old age   [de-identified] Other Paternal Grandfather          in car accident    Other Sister 50        heart arrhythmia    Breast Cancer Maternal Aunt          VITALS     Visit Vitals  /75 (BP 1 Location: Right arm, BP Patient Position: Sitting)   Pulse 80   Temp 97.8 °F (36.6 °C) (Oral)   Resp 18   Ht 5' 7\" (1.702 m)   Wt 303 lb 3.2 oz (137.5 kg)   LMP 2013   SpO2 97%   BMI 47.49 kg/m²          PHYSICAL EXAMINATION   Physical Exam   Constitutional: She is oriented to person, place, and time. No distress. Cardiovascular: Normal rate. Pulmonary/Chest: Effort normal. No respiratory distress. Neurological: She is alert and oriented to person, place, and time. Psychiatric: Mood, memory, affect and judgment normal.   Vitals reviewed.           DIAGNOSTIC DATA         LABORATORY DATA     Results for orders placed or performed in visit on 19   CBC W/O DIFF   Result Value Ref Range    WBC 4.1 3.4 - 10.8 x10E3/uL    RBC 4.35 3.77 - 5.28 x10E6/uL    HGB 13.1 11.1 - 15.9 g/dL    HCT 39.4 34.0 - 46.6 %    MCV 91 79 - 97 fL    MCH 30.1 26.6 - 33.0 pg    MCHC 33.2 31.5 - 35.7 g/dL    RDW 13.9 12.3 - 15.4 %    PLATELET 627 277 - 183 x10E3/uL   LIPID PANEL   Result Value Ref Range    Cholesterol, total 142 100 - 199 mg/dL    Triglyceride 92 0 - 149 mg/dL    HDL Cholesterol 53 >39 mg/dL    VLDL, calculated 18 5 - 40 mg/dL    LDL, calculated 71 0 - 99 mg/dL   METABOLIC PANEL, COMPREHENSIVE   Result Value Ref Range    Glucose 97 65 - 99 mg/dL    BUN 11 6 - 24 mg/dL    Creatinine 0.81 0.57 - 1.00 mg/dL    GFR est non-AA 81 >59 mL/min/1.73    GFR est AA 94 >59 mL/min/1.73    BUN/Creatinine ratio 14 9 - 23    Sodium 142 134 - 144 mmol/L    Potassium 4.5 3.5 - 5.2 mmol/L    Chloride 103 96 - 106 mmol/L CO2 24 20 - 29 mmol/L    Calcium 9.3 8.7 - 10.2 mg/dL    Protein, total 6.6 6.0 - 8.5 g/dL    Albumin 4.4 3.5 - 5.5 g/dL    GLOBULIN, TOTAL 2.2 1.5 - 4.5 g/dL    A-G Ratio 2.0 1.2 - 2.2    Bilirubin, total 0.6 0.0 - 1.2 mg/dL    Alk. phosphatase 71 39 - 117 IU/L    AST (SGOT) 18 0 - 40 IU/L    ALT (SGPT) 20 0 - 32 IU/L   TSH 3RD GENERATION   Result Value Ref Range    TSH 1.220 0.450 - 4.500 uIU/mL   VITAMIN D, 25 HYDROXY   Result Value Ref Range    VITAMIN D, 25-HYDROXY 17.9 (L) 30.0 - 100.0 ng/mL   OCCULT BLOOD IMMUNOASSAY,DIAGNOSTIC   Result Value Ref Range    Occult blood fecal, by IA Negative Negative   CVD REPORT   Result Value Ref Range    INTERPRETATION Note         ASSESSMENT & PLAN       ICD-10-CM ICD-9-CM    1. Depression with anxiety F41.8 300.4 buPROPion XL (WELLBUTRIN XL) 150 mg tablet     Restart Wellbutrin XL at 150 mg qam and continue Lexapro 10 mg daily  Reviewed medications, effects and potential side effects in detail  Referred for counseling. Information given. RTC 4-6 weeks for follow up. She already has a routine appt set for 11-15-19 so she will keep that for the purposes of this follow up. She wont be due an annual fasting CPE until 2-2020.

## 2019-10-17 NOTE — PROGRESS NOTES
Chief Complaint   Patient presents with    Depression     follow up      1. Have you been to the ER, urgent care clinic since your last visit? Hospitalized since your last visit? No    2. Have you seen or consulted any other health care providers outside of the [address] [place] [address] since your last visit? Include any pap smears or colon screening.  No

## 2019-11-14 ENCOUNTER — OFFICE VISIT (OUTPATIENT)
Dept: FAMILY MEDICINE CLINIC | Age: 54
End: 2019-11-14

## 2019-11-14 VITALS
BODY MASS INDEX: 45.99 KG/M2 | HEIGHT: 67 IN | WEIGHT: 293 LBS | RESPIRATION RATE: 18 BRPM | OXYGEN SATURATION: 98 % | DIASTOLIC BLOOD PRESSURE: 60 MMHG | TEMPERATURE: 97.6 F | HEART RATE: 90 BPM | SYSTOLIC BLOOD PRESSURE: 122 MMHG

## 2019-11-14 DIAGNOSIS — F41.8 DEPRESSION WITH ANXIETY: Primary | ICD-10-CM

## 2019-11-14 NOTE — PROGRESS NOTES
Chief Complaint   Patient presents with    Medication Evaluation     f/u Med DX: Depression      1. Have you been to the ER, urgent care clinic since your last visit? Hospitalized since your last visit? No    2. Have you seen or consulted any other health care providers outside of the [address] [place] [address] since your last visit? Include any pap smears or colon screening.  No

## 2019-11-14 NOTE — PROGRESS NOTES
Chief Complaint   Patient presents with    Depression     1 month follow up    Medication Evaluation     Wellbutrin       HISTORY OF PRESENT ILLNESS   HPI  1 month follow up for depression/anxiety since restarting Wellbutrin last visit 10-17-19. Patient reports ~ 50-60% improved in depressive symptoms. Not feeling hopeless anymore. Slightly more motivation and energy. Getting out more. Focusing a bit better. Wt is down a few lbs instead of gaining like she was previously when doing more stress eating. Knows that could be better. Still some expected stress coping w/ her mother's terminal illness. When she first restarted the Wellbutrin she had some nausea for a few days, that went away. She had some mild, dull intermittent headaches but her BP's have been good. Was feeling some \"head zings/tinges\" for a while. Had been eating a lot of chocolate. Since cutting that back the past 2 weeks, those twinges in her head have gone away. The headaches have lessened. She has caffeine daily. Also since restarting the Wellbutrin she has felt a slight increase in her anxiety that was previously really stable and well controlled when on Lexapro alone. But she does not feel the anxiety is bad or uncontrollable and would prefer to stay on the Wellbutrin as is for now. She has not started seeing a counselor yet. REVIEW OF SYMPTOMS   Review of Systems   Constitutional: Negative. Respiratory: Negative. Cardiovascular: Negative. Negative for chest pain and palpitations. Gastrointestinal: Negative. Neurological: Negative for dizziness, sensory change and focal weakness.            PROBLEM LIST/MEDICAL HISTORY     Problem List  Date Reviewed: 11/14/2019          Codes Class Noted    Venous insufficiency of both lower extremities ICD-10-CM: I87.2  ICD-9-CM: 459.81  10/17/2019    Overview Signed 10/17/2019  8:47 AM by Elyssa Huang MD     Vascular Dr. Beena Nolan             PAF (paroxysmal atrial fibrillation) ([facility] ICD-10-CM: I48.0  ICD-9-CM: 427.31  2/22/2019    Overview Signed 2/22/2019  8:28 AM by MD Jenelle Aviles Dr,              Depression with anxiety ICD-10-CM: F41.8  ICD-9-CM: 300.4  2/22/2019        Colonoscopy refused ICD-10-CM: Z53.20  ICD-9-CM: V64.2  2/22/2019        Obesity, morbid ([facility] ICD-10-CM: E66.01  ICD-9-CM: 278.01  2/22/2018        Plantar fasciitis, bilateral ICD-10-CM: M72.2  ICD-9-CM: 728.71  2/22/2018    Overview Signed 2/22/2018 11:30 AM by Torie Elizalde MD     Podiatrist Dr Sherley Healy 1-2018, orthotics             Panic attacks ICD-10-CM: F41.0  ICD-9-CM: 300.01  4/17/2017        Anxiety ICD-10-CM: F41.9  ICD-9-CM: 300.00  9/19/2016    Overview Signed 9/19/2016  2:26 PM by Torie Elizalde MD     [id number]             Postmenopause, LMP ~ 45 yo, 2013, No HRT ICD-10-CM: Z78.0  ICD-9-CM: V49.81  9/19/2016        Palpitations ICD-10-CM: R00.2  ICD-9-CM: 785.1  1/9/2015    Overview Signed 4/2/2015 12:40 PM by Saúl Manzanares MD     1/16 event monitor, pac's otherwise unremarkable             SOB (shortness of breath) ICD-10-CM: R06.02  ICD-9-CM: 786.05  1/9/2015        History of mitral valve disorder ICD-10-CM: Z86.79  ICD-9-CM: V12.59  12/19/2013    Overview Signed 12/19/2013  3:57 PM by Torie Elizalde MD     In her late 19's; told it was \"benign\", no follow up             Vitamin D deficiency ICD-10-CM: E55.9  ICD-9-CM: 268.9  8/20/2013    Overview Signed 8/20/2013  2:18 PM by Torie Elizalde MD     2011             Perimenopause ICD-10-CM: N95.1  ICD-9-CM: 627.2  8/20/2013    Overview Signed 8/20/2013  2:21 PM by Torie Elizalde MD     Since 2012             Scoliosis ICD-10-CM: M41.9  ICD-9-CM: 737.30  6/28/2013    Overview Signed 6/28/2013 10:18 AM by Torie Elizalde MD     Since childhood; no surgery or brace; PT only             Mixed hyperlipidemia ICD-10-CM: E78.2  ICD-9-CM: 272.2 2/7/2011        Esophageal reflux ICD-10-CM: K21.9  ICD-9-CM: 530.81  2/7/2011                  PAST SURGICAL HISTORY     Past Surgical History:   Procedure Laterality Date    HX CHOLECYSTECTOMY  2019    HX COLONOSCOPY      REFUSES    HX CYST REMOVAL  15 yo         MEDICATIONS     Current Outpatient Medications   Medication Sig    buPROPion XL (WELLBUTRIN XL) 150 mg tablet Take 1 Tab by mouth every morning.  escitalopram oxalate (LEXAPRO) 10 mg tablet TAKE 1 TABLET BY MOUTH ONCE DAILY IN THE EVENING    apixaban (ELIQUIS) 5 mg tablet Take 5 mg by mouth two (2) times a day.  atorvastatin (LIPITOR) 20 mg tablet     CARTIA  mg ER capsule Take 120 mg by mouth daily.  COQ10, UBIQUINOL, PO Take  by mouth daily. No current facility-administered medications for this visit.            ALLERGIES     Allergies   Allergen Reactions    Bee Sting [Sting, Bee] Swelling    Crestor [Rosuvastatin] Other (comments)     Worsening ringing in ears, muscle aches, joint pain, tried again 2018, myalgias    Lipitor [Atorvastatin] Myalgia    Prozac [Fluoxetine] Other (comments)     Dizziness and lack of coordination          SOCIAL HISTORY     Social History     Socioeconomic History    Marital status:      Spouse name: Not on file    Number of children: 0    Years of education: Not on file    Highest education level: Not on file   Occupational History    Occupation:  at the [employer]   Tobacco Use    Smoking status: Never Smoker    Smokeless tobacco: Never Used   Substance and Sexual Activity    Alcohol use: No    Drug use: No    Sexual activity: Yes     Partners: Male     Comment: Perimenopause and Rhythm   Other Topics Concern    Caffeine Concern No     Comment: 16 oz of half caff coffee, but eats alot of chocolate ~ 4-5 x a week    Weight Concern Yes     Comment: overweight    Special Diet No    Exercise No     Comment: nothing regular but walks the dog a few x a week x 15-20 minutes        IMMUNIZATIONS  Immunization History   Administered Date(s) Administered    Tdap 2013         FAMILY HISTORY     Family History   Problem Relation Age of Onset    Breast Cancer Mother         diagnosed in her 52's, survivor    Other Mother 64        heart arrhythmia    Heart Failure Mother     Cancer Father 61        lung    Diabetes Father         type 2    Hypertension Father     No Known Problems Sister     No Known Problems Brother     No Known Problems Brother     Breast Cancer Maternal Grandmother          in her late 55's-59's    Heart Attack Maternal Grandfather          in his mid [de-identified] from an MI    Other Paternal [de-identified]          natural causes in old age   [facility] Other Paternal Grandfather          in car accident    Other Sister 50        heart arrhythmia    Breast Cancer Maternal Aunt          VITALS     Visit Vitals  /60 (BP 1 Location: Right arm, BP Patient Position: Sitting)   Pulse 90   Temp 97.6 °F (36.4 °C) (Oral)   Resp 18   Ht 5' 7\" (1.702 m)   Wt 299 lb 6.4 oz (135.8 kg)   LMP 2013   SpO2 98%   BMI 46.89 kg/m²          PHYSICAL EXAMINATION   Physical Exam   Constitutional: She is well-developed, well-nourished, and in no distress. Cardiovascular: Normal rate and regular rhythm. Pulmonary/Chest: Effort normal and breath sounds normal.   Psychiatric: Mood and affect normal.   Vitals reviewed.           DIAGNOSTIC DATA         LABORATORY DATA     Results for orders placed or performed in visit on 19   CBC W/O DIFF   Result Value Ref Range    WBC 4.1 3.4 - 10.8 x10E3/uL    RBC 4.35 3.77 - 5.28 x10E6/uL    HGB 13.1 11.1 - 15.9 g/dL    HCT 39.4 34.0 - 46.6 %    MCV 91 79 - 97 fL    MCH 30.1 26.6 - 33.0 pg    MCHC 33.2 31.5 - 35.7 g/dL    RDW 13.9 12.3 - 15.4 %    PLATELET 694 087 - 895 x10E3/uL   LIPID PANEL   Result Value Ref Range    Cholesterol, total 142 100 - 199 mg/dL    Triglyceride 92 0 - 149 mg/dL    HDL Cholesterol 53 >39 mg/dL    VLDL, calculated 18 5 - 40 mg/dL    LDL, calculated 71 0 - 99 mg/dL   METABOLIC PANEL, COMPREHENSIVE   Result Value Ref Range    Glucose 97 65 - 99 mg/dL    BUN 11 6 - 24 mg/dL    Creatinine 0.81 0.57 - 1.00 mg/dL    GFR est non-AA 81 >59 mL/min/1.73    GFR est AA 94 >59 mL/min/1.73    BUN/Creatinine ratio 14 9 - 23    Sodium 142 134 - 144 mmol/L    Potassium 4.5 3.5 - 5.2 mmol/L    Chloride 103 96 - 106 mmol/L    CO2 24 20 - 29 mmol/L    Calcium 9.3 8.7 - 10.2 mg/dL    Protein, total 6.6 6.0 - 8.5 g/dL    Albumin 4.4 3.5 - 5.5 g/dL    GLOBULIN, TOTAL 2.2 1.5 - 4.5 g/dL    A-G Ratio 2.0 1.2 - 2.2    Bilirubin, total 0.6 0.0 - 1.2 mg/dL    Alk. phosphatase 71 39 - 117 IU/L    AST (SGOT) 18 0 - 40 IU/L    ALT (SGPT) 20 0 - 32 IU/L   TSH 3RD GENERATION   Result Value Ref Range    TSH 1.220 0.450 - 4.500 uIU/mL   VITAMIN D, 25 HYDROXY   Result Value Ref Range    VITAMIN D, 25-HYDROXY 17.9 (L) 30.0 - 100.0 ng/mL   OCCULT BLOOD IMMUNOASSAY,DIAGNOSTIC   Result Value Ref Range    Occult blood fecal, by IA Negative Negative   CVD REPORT   Result Value Ref Range    INTERPRETATION Note           ASSESSMENT & PLAN       ICD-10-CM ICD-9-CM    1. Depression with anxiety F41.8 300.4      Seeing some improvement since restarting Wellbutrin  mg once daily in addition to the Lexapro 10 mg. Will continue current regimen for now  Reviewed medications, effects, and potential side effects in detail  Caffeine avoidance while on Wellbutrin  Work on diet/exercise  She has not started seeing a counselor yet but still has the information I provider her with at last visit. Encouraged her to go ahead and get that appt scheduled. Follow up in 3 months or call back sooner in the interim prn.

## 2019-11-14 NOTE — PATIENT INSTRUCTIONS

## 2020-02-11 ENCOUNTER — OFFICE VISIT (OUTPATIENT)
Dept: FAMILY MEDICINE CLINIC | Age: 55
End: 2020-02-11

## 2020-02-11 ENCOUNTER — HOSPITAL ENCOUNTER (OUTPATIENT)
Dept: LAB | Age: 55
Discharge: HOME | End: 2020-02-11
Payer: COMMERCIAL

## 2020-02-11 VITALS
TEMPERATURE: 97.5 F | HEIGHT: 67 IN | OXYGEN SATURATION: 97 % | DIASTOLIC BLOOD PRESSURE: 76 MMHG | BODY MASS INDEX: 45.99 KG/M2 | WEIGHT: 293 LBS | RESPIRATION RATE: 16 BRPM | HEART RATE: 76 BPM | SYSTOLIC BLOOD PRESSURE: 120 MMHG

## 2020-02-11 DIAGNOSIS — E78.2 MIXED HYPERLIPIDEMIA: ICD-10-CM

## 2020-02-11 DIAGNOSIS — I48.0 PAF (PAROXYSMAL ATRIAL FIBRILLATION) (HCC): ICD-10-CM

## 2020-02-11 DIAGNOSIS — Z01.419 WELL WOMAN EXAM WITH ROUTINE GYNECOLOGICAL EXAM: ICD-10-CM

## 2020-02-11 DIAGNOSIS — Z12.12 SCREENING FOR COLORECTAL CANCER: ICD-10-CM

## 2020-02-11 DIAGNOSIS — Z12.11 SCREENING FOR COLORECTAL CANCER: ICD-10-CM

## 2020-02-11 DIAGNOSIS — Z00.00 ROUTINE GENERAL MEDICAL EXAMINATION AT A HEALTH CARE FACILITY: Primary | ICD-10-CM

## 2020-02-11 DIAGNOSIS — Z12.31 ENCOUNTER FOR SCREENING MAMMOGRAM FOR BREAST CANCER: ICD-10-CM

## 2020-02-11 DIAGNOSIS — F41.8 DEPRESSION WITH ANXIETY: ICD-10-CM

## 2020-02-11 PROCEDURE — 88175 CYTOPATH C/V AUTO FLUID REDO: CPT

## 2020-02-11 PROCEDURE — 87624 HPV HI-RISK TYP POOLED RSLT: CPT

## 2020-02-11 RX ORDER — BENZONATATE 100 MG/1
CAPSULE ORAL
COMMUNITY
Start: 2020-01-04 | End: 2020-02-11

## 2020-02-11 NOTE — PROGRESS NOTES
Chief Complaint   Patient presents with    Complete Physical     fasting      1. Have you been to the ER, urgent care clinic since your last visit? Hospitalized since your last visit? No    2. Have you seen or consulted any other health care providers outside of the [address] [place] [address] since your last visit? Include any pap smears or colon screening.  No

## 2020-02-11 NOTE — PROGRESS NOTES
Chief Complaint   Patient presents with    Complete Physical     fasting     Well Woman    Gyn Exam     pap    Cholesterol Problem     fasting follow up    Depression     mother  in     Medication Management     discuss increasing antidepressants     HISTORY OF PRESENT ILLNESS   HPI  Annual CPE fasting. Well woman visit/gyn exam w/ pap. , LMP age 47 yo, no HRT. Mild hot flashes but hast gotten better over the years. No /Gyn/Breast complaints. Nothing special w/ diet. No exercise. Not really working on wt mgt. Increased sadness and depressed mood since her mother  in December. Lack of motivation. Wants to stay in all the time but forces herself to get out and do things and keeps busy at work. Sleeping ok at night. No increase in the anxiety and no recent panic attacks. She would like to try going back on higher dose of Wellbutrin for a while, that worked well in past.      REVIEW OF SYMPTOMS   Review of Systems   Constitutional: Negative. HENT: Negative. Eyes: Negative. Respiratory: Negative. Cardiovascular: Negative. Gastrointestinal: Negative. Negative for abdominal pain, blood in stool, constipation, diarrhea, melena, nausea and vomiting. Genitourinary: Negative. No complaints of abnormal vaginal discharge or bleeding, post-coital bleeding, dyspareunia, dryness, irritation or urinary complaints. No complaints of breast lumps, tenderness, mass or nipple discharge. Musculoskeletal: Negative for myalgias. Neurological: Negative. Endo/Heme/Allergies: Negative. Psychiatric/Behavioral: Positive for depression. Negative for suicidal ideas. The patient is not nervous/anxious and does not have insomnia.             PROBLEM LIST/MEDICAL HISTORY     Problem List  Date Reviewed: 2020          Codes Class Noted    Venous insufficiency of both lower extremities ICD-10-CM: I87.2  ICD-9-CM: 459.81  10/17/2019    Overview Signed 10/17/2019  8:47 AM by Cade Shepherd MD     Vascular Dr. Anastacia Tabares             PAF (paroxysmal atrial fibrillation) [facility]) ICD-10-CM: I48.0  ICD-9-CM: 427.31  2/22/2019    Overview Signed 2/22/2019  8:28 AM by MD [facility] Carolyn Timmons,              Depression with anxiety ICD-10-CM: F41.8  ICD-9-CM: 300.4  2/22/2019        Colonoscopy refused ICD-10-CM: Z53.20  ICD-9-CM: V64.2  2/22/2019        Obesity, morbid ([address] ICD-10-CM: E66.01  ICD-9-CM: 278.01  2/22/2018        Plantar fasciitis, bilateral ICD-10-CM: M72.2  ICD-9-CM: 728.71  2/22/2018    Overview Signed 2/22/2018 11:30 AM by Cade Shepherd MD     Podiatrist Dr [place] 1-2018, orthotics             Panic attacks ICD-10-CM: F41.0  ICD-9-CM: 300.01  4/17/2017        Anxiety ICD-10-CM: F41.9  ICD-9-CM: 300.00  9/19/2016    Overview Signed 9/19/2016  2:26 PM by Cade Shepherd MD     [id number]             Postmenopause, LMP ~ 45 yo, 2013, No HRT ICD-10-CM: Z78.0  ICD-9-CM: V49.81  9/19/2016        Palpitations ICD-10-CM: R00.2  ICD-9-CM: 785.1  1/9/2015    Overview Signed 4/2/2015 12:40 PM by Tung Garcia MD     1/16 event monitor, pac's otherwise unremarkable             SOB (shortness of breath) ICD-10-CM: R06.02  ICD-9-CM: 786.05  1/9/2015        History of mitral valve disorder ICD-10-CM: Z86.79  ICD-9-CM: V12.59  12/19/2013    Overview Signed 12/19/2013  3:57 PM by Cade Shepherd MD     In her late 19's; told it was \"benign\", no follow up             Vitamin D deficiency ICD-10-CM: E55.9  ICD-9-CM: 268.9  8/20/2013    Overview Signed 8/20/2013  2:18 PM by Cade Shepherd MD     2011             Perimenopause ICD-10-CM: N95.1  ICD-9-CM: 627.2  8/20/2013    Overview Signed 8/20/2013  2:21 PM by Cade Shepherd MD     Since 2012             Scoliosis ICD-10-CM: M41.9  ICD-9-CM: 737.30  6/28/2013    Overview Signed 6/28/2013 10:18 AM by Cade Shepherd MD     Since childhood; no surgery or brace; PT only             Mixed hyperlipidemia ICD-10-CM: E78.2  ICD-9-CM: 272.2  2/7/2011        Esophageal reflux ICD-10-CM: K21.9  ICD-9-CM: 530.81  2/7/2011                  PAST SURGICAL HISTORY     Past Surgical History:   Procedure Laterality Date    HX CHOLECYSTECTOMY  2019    HX COLONOSCOPY      REFUSES    HX CYST REMOVAL  15 yo         MEDICATIONS     Current Outpatient Medications   Medication Sig    buPROPion XL (WELLBUTRIN XL) 150 mg tablet TAKE 1 TABLET BY MOUTH ONCE DAILY IN THE MORNING    escitalopram oxalate (LEXAPRO) 10 mg tablet TAKE 1 TABLET BY MOUTH ONCE DAILY IN THE EVENING    apixaban (ELIQUIS) 5 mg tablet Take 5 mg by mouth two (2) times a day.  atorvastatin (LIPITOR) 20 mg tablet Take 20 mg by mouth daily.  CARTIA  mg ER capsule Take 120 mg by mouth daily.  COQ10, UBIQUINOL, PO Take  by mouth daily. No current facility-administered medications for this visit.            ALLERGIES     Allergies   Allergen Reactions    Bee Sting [Sting, Bee] Swelling    Crestor [Rosuvastatin] Other (comments)     Worsening ringing in ears, muscle aches, joint pain, tried again 2018, myalgias    Lipitor [Atorvastatin] Myalgia    Meloxicam Other (comments)     Inc blood pressure    Prozac [Fluoxetine] Other (comments)     Dizziness and lack of coordination          SOCIAL HISTORY     Social History     Socioeconomic History    Marital status:      Spouse name: Not on file    Number of children: 0    Years of education: Not on file    Highest education level: Not on file   Occupational History    Occupation:  at the [employer]   Tobacco Use    Smoking status: Never Smoker    Smokeless tobacco: Never Used   Substance and Sexual Activity    Alcohol use: No    Drug use: No    Sexual activity: Yes     Partners: Male     Comment: Perimenopause and Rhythm   Other Topics Concern    Caffeine Concern No     Comment: 16 oz of half caff coffee, but eats alot of chocolate ~ 4-5 x a week    Weight Concern Yes     Comment: overweight    Special Diet No    Exercise No     Comment: nothing regular but walks the dog a few x a week x 15-20 minutes        IMMUNIZATIONS  Immunization History   Administered Date(s) Administered    Tdap 2013         FAMILY HISTORY     Family History   Problem Relation Age of Onset    Breast Cancer Mother         diagnosed in her 52's, survivor    Other Mother 64        heart arrhythmia    Heart Failure Mother    [facility] COPD Mother          age 80,     Cancer Father 61        lung    Diabetes Father         type 2    Hypertension Father     No Known Problems Sister     No Known Problems Brother     No Known Problems Brother     Breast Cancer Maternal Grandmother          in her late 55's-59's    Heart Attack Maternal Grandfather          in his mid [de-identified] from an MI    Other Paternal Grandmother          natural causes in old age   [facility] Other Paternal Grandfather          in car accident    Other Sister 50        heart arrhythmia    Breast Cancer Maternal Aunt          VITALS     Visit Vitals  /76 (BP 1 Location: Left arm, BP Patient Position: Sitting)   Pulse 76   Temp 97.5 °F (36.4 °C) (Oral)   Resp 16   Ht 5' 7\" (1.702 m)   Wt 294 lb (133.4 kg)   LMP 2013   SpO2 97%   BMI 46.05 kg/m²          PHYSICAL EXAMINATION   Physical Exam  Vitals signs reviewed. Constitutional:       General: She is not in acute distress. Appearance: She is obese. HENT:      Right Ear: Tympanic membrane normal.      Nose: Nose normal.      Mouth/Throat:      Mouth: Mucous membranes are moist.      Pharynx: Oropharynx is clear. Eyes:      General: No scleral icterus. Conjunctiva/sclera: Conjunctivae normal.   Neck:      Musculoskeletal: Neck supple. Thyroid: No thyroid mass, thyromegaly or thyroid tenderness. Vascular: No carotid bruit.    Cardiovascular:      Rate and Rhythm: Normal rate and regular rhythm. Heart sounds: Normal heart sounds. No murmur. No gallop. Pulmonary:      Effort: Pulmonary effort is normal.      Breath sounds: Normal breath sounds. Chest:      Breasts:         Right: Normal. No mass or tenderness. Left: Normal. No mass or tenderness. Abdominal:      Palpations: Abdomen is soft. There is no mass. Tenderness: There is no abdominal tenderness. Genitourinary:     Labia:         Right: No rash, tenderness or lesion. Left: No rash, tenderness or lesion. Vagina: Normal.      Cervix: No discharge or lesion. Uterus: Normal. Not enlarged and not tender. Adnexa:         Right: No mass, tenderness or fullness. Left: No mass, tenderness or fullness. Musculoskeletal:         General: No tenderness. Right lower leg: No edema. Left lower leg: No edema. Lymphadenopathy:      Cervical: No cervical adenopathy. Upper Body:      Right upper body: No supraclavicular or axillary adenopathy. Left upper body: No supraclavicular or axillary adenopathy. Skin:     General: Skin is warm. Neurological:      General: No focal deficit present. Mental Status: She is oriented to person, place, and time. Cranial Nerves: No cranial nerve deficit. Psychiatric:         Mood and Affect: Mood normal.         Behavior: Behavior normal.         Thought Content:  Thought content normal.         Judgment: Judgment normal.             DIAGNOSTIC DATA         LABORATORY DATA     Results for orders placed or performed in visit on 02/22/19   CBC W/O DIFF   Result Value Ref Range    WBC 4.1 3.4 - 10.8 x10E3/uL    RBC 4.35 3.77 - 5.28 x10E6/uL    HGB 13.1 11.1 - 15.9 g/dL    HCT 39.4 34.0 - 46.6 %    MCV 91 79 - 97 fL    MCH 30.1 26.6 - 33.0 pg    MCHC 33.2 31.5 - 35.7 g/dL    RDW 13.9 12.3 - 15.4 %    PLATELET 647 776 - 095 x10E3/uL   LIPID PANEL   Result Value Ref Range    Cholesterol, total 142 100 - 199 mg/dL    Triglyceride 92 0 - 149 mg/dL    HDL Cholesterol 53 >39 mg/dL    VLDL, calculated 18 5 - 40 mg/dL    LDL, calculated 71 0 - 99 mg/dL   METABOLIC PANEL, COMPREHENSIVE   Result Value Ref Range    Glucose 97 65 - 99 mg/dL    BUN 11 6 - 24 mg/dL    Creatinine 0.81 0.57 - 1.00 mg/dL    GFR est non-AA 81 >59 mL/min/1.73    GFR est AA 94 >59 mL/min/1.73    BUN/Creatinine ratio 14 9 - 23    Sodium 142 134 - 144 mmol/L    Potassium 4.5 3.5 - 5.2 mmol/L    Chloride 103 96 - 106 mmol/L    CO2 24 20 - 29 mmol/L    Calcium 9.3 8.7 - 10.2 mg/dL    Protein, total 6.6 6.0 - 8.5 g/dL    Albumin 4.4 3.5 - 5.5 g/dL    GLOBULIN, TOTAL 2.2 1.5 - 4.5 g/dL    A-G Ratio 2.0 1.2 - 2.2    Bilirubin, total 0.6 0.0 - 1.2 mg/dL    Alk. phosphatase 71 39 - 117 IU/L    AST (SGOT) 18 0 - 40 IU/L    ALT (SGPT) 20 0 - 32 IU/L   TSH 3RD GENERATION   Result Value Ref Range    TSH 1.220 0.450 - 4.500 uIU/mL   VITAMIN D, 25 HYDROXY   Result Value Ref Range    VITAMIN D, 25-HYDROXY 17.9 (L) 30.0 - 100.0 ng/mL   OCCULT BLOOD IMMUNOASSAY,DIAGNOSTIC   Result Value Ref Range    Occult blood fecal, by IA Negative Negative   CVD REPORT   Result Value Ref Range    INTERPRETATION Note             ASSESSMENT & PLAN       ICD-10-CM ICD-9-CM    1. Routine general medical examination at a health care facility Z00.00 V70.0 CBC W/O DIFF      LIPID PANEL      METABOLIC PANEL, COMPREHENSIVE      TSH 3RD GENERATION   2. Well woman exam with routine gynecological exam Z01.419 V72.31 PAP IG, APTIMA HPV AND RFX 16/18,45 ([id number])   3. Encounter for screening mammogram for breast cancer Z12.31 V76.12 MARILU MAMMO BI SCREENING INCL CAD   4. Screening for colorectal cancer Z12.11 V76.51 OCCULT BLOOD IMMUNOASSAY,DIAGNOSTIC    Z12.12     5. Depression with anxiety F41.8 300.4    6. Mixed hyperlipidemia E78.2 272.2 LIPID PANEL   7.  PAF (paroxysmal atrial fibrillation) (HCC) I48.0 427.31      Fasting labs today  Cardiovascular risk and specific lipid/LDL goals reviewed  Sees cardiology Dr. Julia Simmons annually in the spring and is scheduled for routine follow up again there in April. Pap collected today  Mammogram screen ordered  Colonoscopy refused again this year  Will do FIT. Order place. Card given at lab  Reviewed diet, nutrition, exercise, weight management, BMI/goals, age/risk based screening recommendations, health maintenance & prevention counseling. Cancer screening USPTFS guidelines reviewed w/ pt today. Discussed benefits/positive/negative outcomes of screening based on age/risk stratification. Informed consent for/against screening based on pt's personal hx/risk factors. Updated in history above and health maintenance. Reviewed medications and side effects in detail  Doing well on current regimen. She would like to try increasing the Wellbutrin XL from 150 to 300 mg as this has been effective in the past.  If it heightens her anxiety she will go back to the 150 dose and try increasing Lexapro from 10 to 20 mg instead  She believes she has ample supply of each of these, so she will start by taking 2 of the Wellbutrin  mg tabs once daily and update me in a few weeks w/ how this is working for her. Further follow up & other recommendations pending review of labs as well.

## 2020-02-11 NOTE — PATIENT INSTRUCTIONS
Well Visit, Women 48 to 72: Care Instructions  Your Care Instructions    Physical exams can help you stay healthy. Your doctor has checked your overall health and may have suggested ways to take good care of yourself. He or she also may have recommended tests. At home, you can help prevent illness with healthy eating, regular exercise, and other steps. Follow-up care is a key part of your treatment and safety. Be sure to make and go to all appointments, and call your doctor if you are having problems. It's also a good idea to know your test results and keep a list of the medicines you take. How can you care for yourself at home? · Reach and stay at a healthy weight. This will lower your risk for many problems, such as obesity, diabetes, heart disease, and high blood pressure. · Get at least 30 minutes of exercise on most days of the week. Walking is a good choice. You also may want to do other activities, such as running, swimming, cycling, or playing tennis or team sports. · Do not smoke. Smoking can make health problems worse. If you need help quitting, talk to your doctor about stop-smoking programs and medicines. These can increase your chances of quitting for good. · Protect your skin from too much sun. When you're outdoors from 10 a.m. to 4 p.m., stay in the shade or cover up with clothing and a hat with a wide brim. Wear sunglasses that block UV rays. Even when it's cloudy, put broad-spectrum sunscreen (SPF 30 or higher) on any exposed skin. · See a dentist one or two times a year for checkups and to have your teeth cleaned. · Wear a seat belt in the car. Follow your doctor's advice about when to have certain tests. These tests can spot problems early. · Cholesterol. Your doctor will tell you how often to have this done based on your age, family history, or other things that can increase your risk for heart attack and stroke. · Blood pressure.  Have your blood pressure checked during a routine doctor visit. Your doctor will tell you how often to check your blood pressure based on your age, your blood pressure results, and other factors. · Mammogram. Ask your doctor how often you should have a mammogram, which is an X-ray of your breasts. A mammogram can spot breast cancer before it can be felt and when it is easiest to treat. · Pap test and pelvic exam. Ask your doctor how often you should have a Pap test. You may not need to have a Pap test as often as you used to. · Vision. Have your eyes checked every year or two or as often as your doctor suggests. Some experts recommend that you have yearly exams for glaucoma and other age-related eye problems starting at age 48. · Hearing. Tell your doctor if you notice any change in your hearing. You can have tests to find out how well you hear. · Diabetes. Ask your doctor whether you should have tests for diabetes. · Colorectal cancer. Your risk for colorectal cancer gets higher as you get older. Some experts say that adults should start regular screening at age 48 and stop at age 76. Others say to start before age 48 or continue after age 76. Talk with your doctor about your risk and when to start and stop screening. · Thyroid disease. Talk to your doctor about whether to have your thyroid checked as part of a regular physical exam. Women have an increased chance of a thyroid problem. · Osteoporosis. You should begin tests for bone density at age 72. If you are younger than 72, ask your doctor whether you have factors that may increase your risk for this disease. You may want to have this test before age 72. · Heart attack and stroke risk. At least every 4 to 6 years, you should have your risk for heart attack and stroke assessed. Your doctor uses factors such as your age, blood pressure, cholesterol, and whether you smoke or have diabetes to show what your risk for a heart attack or stroke is over the next 10 years.   When should you call for help?  Watch closely for changes in your health, and be sure to contact your doctor if you have any problems or symptoms that concern you. Where can you learn more? Go to http://rosa-susan.info/. Enter F652 in the search box to learn more about \"Well Visit, Women 50 to 72: Care Instructions. \"  Current as of: December 13, 2018  Content Version: 12.2  © 8978-2166 [employer]. Care instructions adapted under license by [employer] (which disclaims liability or warranty for this information). If you have questions about a medical condition or this instruction, always ask your healthcare professional. [place] [address] any warranty or liability for your use of this information. Body Mass Index: Care Instructions  Your Care Instructions    Body mass index (BMI) can help you see if your weight is raising your risk for health problems. It uses a formula to compare how much you weigh with how tall you are. · A BMI lower than 18.5 is considered underweight. · A BMI between 18.5 and 24.9 is considered healthy. · A BMI between 25 and 29.9 is considered overweight. A BMI of 30 or higher is considered obese. If your BMI is in the normal range, it means that you have a lower risk for weight-related health problems. If your BMI is in the overweight or obese range, you may be at increased risk for weight-related health problems, such as high blood pressure, heart disease, stroke, arthritis or joint pain, and diabetes. If your BMI is in the underweight range, you may be at increased risk for health problems such as fatigue, lower protection (immunity) against illness, muscle loss, bone loss, hair loss, and hormone problems. BMI is just one measure of your risk for weight-related health problems. You may be at higher risk for health problems if you are not active, you eat an unhealthy diet, or you drink too much alcohol or use tobacco products.   Follow-up care is a key part of your treatment and safety. Be sure to make and go to all appointments, and call your doctor if you are having problems. It's also a good idea to know your test results and keep a list of the medicines you take. How can you care for yourself at home? · Practice healthy eating habits. This includes eating plenty of fruits, vegetables, whole grains, lean protein, and low-fat dairy. · If your doctor recommends it, get more exercise. Walking is a good choice. Bit by bit, increase the amount you walk every day. Try for at least 30 minutes on most days of the week. · Do not smoke. Smoking can increase your risk for health problems. If you need help quitting, talk to your doctor about stop-smoking programs and medicines. These can increase your chances of quitting for good. · Limit alcohol to 2 drinks a day for men and 1 drink a day for women. Too much alcohol can cause health problems. If you have a BMI higher than 25  · Your doctor may do other tests to check your risk for weight-related health problems. This may include measuring the distance around your waist. A waist measurement of more than 40 inches in men or 35 inches in women can increase the risk of weight-related health problems. · Talk with your doctor about steps you can take to stay healthy or improve your health. You may need to make lifestyle changes to lose weight and stay healthy, such as changing your diet and getting regular exercise. If you have a BMI lower than 18.5  · Your doctor may do other tests to check your risk for health problems. · Talk with your doctor about steps you can take to stay healthy or improve your health. You may need to make lifestyle changes to gain or maintain weight and stay healthy, such as getting more healthy foods in your diet and doing exercises to build muscle. Where can you learn more? Go to http://rosa-susan.info/.   Enter S176 in the search box to learn more about \"Body Mass Index: Care Instructions. \"  Current as of: October 13, 2016  Content Version: 11.4  © 4331-5873 [employer]. Care instructions adapted under license by [employer] (which disclaims liability or warranty for this information). If you have questions about a medical condition or this instruction, always ask your healthcare professional. [address] any warranty or liability for your use of this information.

## 2020-02-12 LAB
ALBUMIN SERPL-MCNC: 4.2 G/DL (ref 3.8–4.9)
ALBUMIN/GLOB SERPL: 1.6 {RATIO} (ref 1.2–2.2)
ALP SERPL-CCNC: 80 IU/L (ref 39–117)
ALT SERPL-CCNC: 18 IU/L (ref 0–32)
AST SERPL-CCNC: 19 IU/L (ref 0–40)
BILIRUB SERPL-MCNC: 0.4 MG/DL (ref 0–1.2)
BUN SERPL-MCNC: 15 MG/DL (ref 6–24)
BUN/CREAT SERPL: 17 (ref 9–23)
CALCIUM SERPL-MCNC: 9.4 MG/DL (ref 8.7–10.2)
CHLORIDE SERPL-SCNC: 101 MMOL/L (ref 96–106)
CHOLEST SERPL-MCNC: 151 MG/DL (ref 100–199)
CO2 SERPL-SCNC: 26 MMOL/L (ref 20–29)
CREAT SERPL-MCNC: 0.9 MG/DL (ref 0.57–1)
ERYTHROCYTE [DISTWIDTH] IN BLOOD BY AUTOMATED COUNT: 12.5 % (ref 11.7–15.4)
GLOBULIN SER CALC-MCNC: 2.6 G/DL (ref 1.5–4.5)
GLUCOSE SERPL-MCNC: 92 MG/DL (ref 65–99)
HCT VFR BLD AUTO: 39.8 % (ref 34–46.6)
HDLC SERPL-MCNC: 52 MG/DL
HGB BLD-MCNC: 12.9 G/DL (ref 11.1–15.9)
INTERPRETATION, 910389: NORMAL
LDLC SERPL CALC-MCNC: 80 MG/DL (ref 0–99)
MCH RBC QN AUTO: 30 PG (ref 26.6–33)
MCHC RBC AUTO-ENTMCNC: 32.4 G/DL (ref 31.5–35.7)
MCV RBC AUTO: 93 FL (ref 79–97)
PLATELET # BLD AUTO: 234 X10E3/UL (ref 150–450)
POTASSIUM SERPL-SCNC: 4.8 MMOL/L (ref 3.5–5.2)
PROT SERPL-MCNC: 6.8 G/DL (ref 6–8.5)
RBC # BLD AUTO: 4.3 X10E6/UL (ref 3.77–5.28)
SODIUM SERPL-SCNC: 141 MMOL/L (ref 134–144)
TRIGL SERPL-MCNC: 95 MG/DL (ref 0–149)
TSH SERPL DL<=0.005 MIU/L-ACNC: 1.41 UIU/ML (ref 0.45–4.5)
VLDLC SERPL CALC-MCNC: 19 MG/DL (ref 5–40)
WBC # BLD AUTO: 5.4 X10E3/UL (ref 3.4–10.8)

## 2020-03-06 ENCOUNTER — HOSPITAL ENCOUNTER (OUTPATIENT)
Dept: MAMMOGRAPHY | Age: 55
Discharge: HOME | End: 2020-03-06
Attending: FAMILY MEDICINE
Payer: COMMERCIAL

## 2020-03-06 DIAGNOSIS — Z12.31 ENCOUNTER FOR SCREENING MAMMOGRAM FOR BREAST CANCER: ICD-10-CM

## 2020-03-06 PROCEDURE — 77067 SCR MAMMO BI INCL CAD: CPT

## 2020-07-10 ENCOUNTER — VIRTUAL VISIT (OUTPATIENT)
Dept: FAMILY MEDICINE CLINIC | Age: 55
End: 2020-07-10

## 2020-07-10 DIAGNOSIS — E66.01 MORBID OBESITY WITH BMI OF 45.0-49.9, ADULT (HCC): ICD-10-CM

## 2020-07-10 DIAGNOSIS — F41.8 DEPRESSION WITH ANXIETY: Primary | ICD-10-CM

## 2020-07-10 DIAGNOSIS — F43.29 STRESS AND ADJUSTMENT REACTION: ICD-10-CM

## 2020-07-10 DIAGNOSIS — G47.33 OSA (OBSTRUCTIVE SLEEP APNEA): ICD-10-CM

## 2020-07-10 RX ORDER — ESCITALOPRAM OXALATE 20 MG/1
20 TABLET ORAL EVERY EVENING
Qty: 90 TAB | Refills: 0 | Status: SHIPPED | OUTPATIENT
Start: 2020-07-10 | End: 2021-05-05

## 2020-07-10 NOTE — PROGRESS NOTES
VIRTUAL VISIT    Patient seen via virtual visit today for the following:  Chief Complaint   Patient presents with    Stress    Depression    Anxiety       HISTORY OF PRESENT ILLNESS   HPI  Patient has been under a great deal of stress the past few months. Her mother  6 months ago and she thought she was coping pretty well w/ it but now is not so sure. She has been under increased work demands on her job in light of recent covid pandemic resulting in decreased staff at work. She has been feeling overwhelmed and often scattered and derailed. She recently had a coworker file a complaint against her for being overly agitated and aggressive toward them, accusing her of slamming down a file on the desk. She realizes she has been a bit more easily agitated but didn't realize she couldve possibly gotten to this point. Other co workers have not complained. Her friends and family say she just seems stressed lately but nothing in appropriate. However this incident at work really has her feeling like maybe she is going through some depression and anxiety. She has been more tearful. Feels like she is losing control. She has not been sleeping well for quite a while now. Mind races. Tired. She also states she snores a lot and often times awakens herself, gasping for air. REVIEW OF SYMPTOMS   Review of Systems   Constitutional: Positive for malaise/fatigue. Respiratory: Negative. Cardiovascular: Negative. Gastrointestinal: Negative. Neurological: Negative. Psychiatric/Behavioral: Positive for depression. Negative for suicidal ideas. The patient is nervous/anxious and has insomnia.             PROBLEM LIST/MEDICAL HISTORY     Problem List  Date Reviewed: 7/10/2020          Codes Class Noted    Morbid obesity with BMI of 45.0-49.9, adult [facility]) ICD-10-CM: E66.01, Z68.42  ICD-9-CM: 278.01, V85.42  7/10/2020        Venous insufficiency of both lower extremities ICD-10-CM: I87.2  ICD-9-CM: 459.81 10/17/2019    Overview Signed 10/17/2019  8:47 AM by Irvin Riley MD     Vascular Dr. Joanie Golden             PAF (paroxysmal atrial fibrillation) [facility]) ICD-10-CM: I48.0  ICD-9-CM: 427.31  2/22/2019    Overview Signed 2/22/2019  8:28 AM by Irvin Rliey MD     [address] Dwayne Mcginnis,              Depression with anxiety ICD-10-CM: F41.8  ICD-9-CM: 300.4  2/22/2019        Colonoscopy refused ICD-10-CM: Z53.20  ICD-9-CM: V64.2  2/22/2019        Obesity, morbid ([address] ICD-10-CM: E66.01  ICD-9-CM: 278.01  2/22/2018        Plantar fasciitis, bilateral ICD-10-CM: M72.2  ICD-9-CM: 728.71  2/22/2018    Overview Signed 2/22/2018 11:30 AM by Irvin Riley MD     Podiatrist Dr Lamin Mayorga 1-2018, orthotics             Panic attacks ICD-10-CM: F41.0  ICD-9-CM: 300.01  4/17/2017        Anxiety ICD-10-CM: F41.9  ICD-9-CM: 300.00  9/19/2016    Overview Signed 9/19/2016  2:26 PM by Irvin Riley MD     [id number]             Postmenopause, LMP ~ 47 yo, 2013, No HRT ICD-10-CM: Z78.0  ICD-9-CM: V49.81  9/19/2016        Palpitations ICD-10-CM: R00.2  ICD-9-CM: 785.1  1/9/2015    Overview Signed 4/2/2015 12:40 PM by Gregorio Motley MD     1/16 event monitor, pac's otherwise unremarkable             SOB (shortness of breath) ICD-10-CM: R06.02  ICD-9-CM: 786.05  1/9/2015        History of mitral valve disorder ICD-10-CM: Z86.79  ICD-9-CM: V12.59  12/19/2013    Overview Signed 12/19/2013  3:57 PM by Irvin Riley MD     In her late 19's; told it was \"benign\", no follow up             Vitamin D deficiency ICD-10-CM: E55.9  ICD-9-CM: 268.9  8/20/2013    Overview Signed 8/20/2013  2:18 PM by Irvin Riley MD     2011             Perimenopause ICD-10-CM: N95.1  ICD-9-CM: 627.2  8/20/2013    Overview Signed 8/20/2013  2:21 PM by Irvin Riley MD     Since 2012             Scoliosis ICD-10-CM: M41.9  ICD-9-CM: 737.30  6/28/2013    Overview Signed 6/28/2013 10:18 AM by Ihsan Hays MD     Since childhood; no surgery or brace; PT only             Mixed hyperlipidemia ICD-10-CM: E78.2  ICD-9-CM: 272.2  2/7/2011        Esophageal reflux ICD-10-CM: K21.9  ICD-9-CM: 530.81  2/7/2011                  PAST SURGICAL HISTORY     Past Surgical History:   Procedure Laterality Date    HX CHOLECYSTECTOMY  2019    HX COLONOSCOPY      REFUSES    HX CYST REMOVAL  15 yo         MEDICATIONS     Current Outpatient Medications   Medication Sig    escitalopram oxalate (LEXAPRO) 10 mg tablet TAKE 1 TABLET BY MOUTH ONCE DAILY IN THE EVENING    buPROPion XL (WELLBUTRIN XL) 150 mg tablet TAKE 1 TABLET BY MOUTH ONCE DAILY IN THE MORNING    apixaban (ELIQUIS) 5 mg tablet Take 5 mg by mouth two (2) times a day.  atorvastatin (LIPITOR) 20 mg tablet Take 20 mg by mouth daily.  CARTIA  mg ER capsule Take 120 mg by mouth daily.  COQ10, UBIQUINOL, PO Take  by mouth daily. No current facility-administered medications for this visit.            ALLERGIES     Allergies   Allergen Reactions    Bee Sting [Sting, Bee] Swelling    Crestor [Rosuvastatin] Other (comments)     Worsening ringing in ears, muscle aches, joint pain, tried again 2018, myalgias    Lipitor [Atorvastatin] Myalgia    Meloxicam Other (comments)     Inc blood pressure    Other Medication Other (comments)     Wellbutrin 300 mg---> Increased anxiety/nervousness/jittery    Prozac [Fluoxetine] Other (comments)     Dizziness and lack of coordination          SOCIAL HISTORY     Social History     Socioeconomic History    Marital status:      Spouse name: Not on file    Number of children: 0    Years of education: Not on file    Highest education level: Not on file   Occupational History    Occupation:  at the [place]   Tobacco Use    Smoking status: Never Smoker    Smokeless tobacco: Never Used   Substance and Sexual Activity    Alcohol use: No    Drug use: No    Sexual activity: Yes     Partners: Male     Comment: Perimenopause and Rhythm   Other Topics Concern    Caffeine Concern No     Comment: 16 oz of half caff coffee, but eats alot of chocolate ~ 4-5 x a week    Weight Concern Yes     Comment: overweight    Special Diet No    Exercise No     Comment: nothing regular but walks the dog a few x a week x 15-20 minutes        IMMUNIZATIONS  Immunization History   Administered Date(s) Administered    Tdap 2013         FAMILY HISTORY     Family History   Problem Relation Age of Onset    Breast Cancer Mother         diagnosed in her 52's, survivor    Other Mother 64        heart arrhythmia    Heart Failure Mother     COPD Mother          age 80, -    Cancer Father 61        lung    Diabetes Father         type 2    Hypertension Father     No Known Problems Sister     No Known Problems Brother     No Known Problems Brother     Breast Cancer Maternal Grandmother          in her late 55's-59's    Heart Attack Maternal Grandfather          in his mid [de-identified] from an MI    Other Paternal Grandmother          natural causes in old age   [facility] Other Paternal Grandfather          in car accident    Other Sister 50        heart arrhythmia    Breast Cancer Maternal Aunt          VITALS   Vitals limited due to virtual visit. Self reported data collected today: None   PHYSICAL EXAMINATION   Physical Exam  Constitutional:       Appearance: She is obese. Pulmonary:      Effort: Pulmonary effort is normal. No respiratory distress. Neurological:      Mental Status: She is alert and oriented to person, place, and time. Mental status is at baseline. Psychiatric:         Attention and Perception: Attention normal.         Mood and Affect: Affect is tearful.          Behavior: Behavior normal.         Cognition and Memory: Cognition normal.             DIAGNOSTIC DATA         LABORATORY DATA     Results for orders placed or performed in visit on 02/11/20   CBC W/O DIFF   Result Value Ref Range    WBC 5.4 3.4 - 10.8 x10E3/uL    RBC 4.30 3.77 - 5.28 x10E6/uL    HGB 12.9 11.1 - 15.9 g/dL    HCT 39.8 34.0 - 46.6 %    MCV 93 79 - 97 fL    MCH 30.0 26.6 - 33.0 pg    MCHC 32.4 31.5 - 35.7 g/dL    RDW 12.5 11.7 - 15.4 %    PLATELET 395 000 - 237 x10E3/uL   LIPID PANEL   Result Value Ref Range    Cholesterol, total 151 100 - 199 mg/dL    Triglyceride 95 0 - 149 mg/dL    HDL Cholesterol 52 >39 mg/dL    VLDL, calculated 19 5 - 40 mg/dL    LDL, calculated 80 0 - 99 mg/dL   METABOLIC PANEL, COMPREHENSIVE   Result Value Ref Range    Glucose 92 65 - 99 mg/dL    BUN 15 6 - 24 mg/dL    Creatinine 0.90 0.57 - 1.00 mg/dL    GFR est non-AA 71 >59 mL/min/1.73    GFR est AA 82 >59 mL/min/1.73    BUN/Creatinine ratio 17 9 - 23    Sodium 141 134 - 144 mmol/L    Potassium 4.8 3.5 - 5.2 mmol/L    Chloride 101 96 - 106 mmol/L    CO2 26 20 - 29 mmol/L    Calcium 9.4 8.7 - 10.2 mg/dL    Protein, total 6.8 6.0 - 8.5 g/dL    Albumin 4.2 3.8 - 4.9 g/dL    GLOBULIN, TOTAL 2.6 1.5 - 4.5 g/dL    A-G Ratio 1.6 1.2 - 2.2    Bilirubin, total 0.4 0.0 - 1.2 mg/dL    Alk. phosphatase 80 39 - 117 IU/L    AST (SGOT) 19 0 - 40 IU/L    ALT (SGPT) 18 0 - 32 IU/L   TSH 3RD GENERATION   Result Value Ref Range    TSH 1.410 0.450 - 4.500 uIU/mL   CVD REPORT   Result Value Ref Range    INTERPRETATION Note           ASSESSMENT & PLAN       ICD-10-CM ICD-9-CM    1. Depression with anxiety  F41.8 300.4 Increase escitalopram oxalate (LEXAPRO) to 20 mg tablet and continue regimen of Wellbutrin  mg once daily; reviewed medications and side effects in detail   2. Stress and adjustment reaction  F43.29 309.89 Referred for counseling   3. LILLIAM (obstructive sleep apnea)  G47.33 327.23 SLEEP MEDICINE REFERRAL   4.  Morbid obesity with BMI of 45.0-49.9, adult ([facility]  E66.01 278.01     Z68.42 V85.42      Follow up 4 weeks, sooner prn  ---------------------------------------------------------------------------------------------------------------  Patient is being evaluated by a video visit encounter for concerns as above. A caregiver was present when appropriate. Due to this being a TeleHealth encounter (During XMODJ-03 public health emergency), evaluation of the following organ systems was limited: Vitals/Constitutional/EENT/Resp/CV/GI//MS/Neuro/Skin/Heme-Lymph-Imm. Pursuant to the emergency declaration under the [address] [place] [address], [address] [place] [address] authority and the [employer] and [employer] Act, this Virtual  Visit was conducted, with patient's (and/or legal guardian's) consent, to reduce the patient's risk of exposure to COVID-19 and provide necessary medical care. Services were provided through a video synchronous discussion virtually to substitute for in-person clinic visit. Patient was located at their own home. I was at home while conducting this encounter. Consent:  She and/or her healthcare decision maker is aware that this patient-initiated Telehealth encounter is a billable service, with coverage as determined by her insurance carrier. She is aware that she may receive a bill and has provided verbal consent to proceed: Yes  This virtual visit was conducted via D[employer] me. Pursuant to the emergency declaration under the [address] [place] [address], [facility] authority and the [employer] and [employer] Act, this Virtual  Visit was conducted to reduce the patient's risk of exposure to COVID-19 and provide continuity of care for an established patient. Services were provided through a video synchronous discussion virtually to substitute for in-person clinic visit. Due to this being a TeleHealth evaluation, many elements of the physical examination are unable to be assessed.    Total Time: minutes: 21-30 minutes.

## 2020-07-29 ENCOUNTER — TELEPHONE (OUTPATIENT)
Dept: FAMILY MEDICINE CLINIC | Age: 55
End: 2020-07-29

## 2020-07-29 NOTE — TELEPHONE ENCOUNTER
Pt called to inquire about her appt for sleep apnea. She states she had a vv w/ regarding the machine, but no one has contacted her.   Pt is req a callback, she is not sure whether she was supposed to call someone or they call her    BCB#[phone]

## 2020-07-29 NOTE — TELEPHONE ENCOUNTER
Outbound call to patient. Patient made aware that someone should be giving her a call to schedule appointment for sleep medicine.      Patient verbalized understanding and also given name and phone number that is on sleep medicine referral.

## 2020-11-03 ENCOUNTER — VIRTUAL VISIT (OUTPATIENT)
Dept: FAMILY MEDICINE CLINIC | Age: 55
End: 2020-11-03
Payer: COMMERCIAL

## 2020-11-03 DIAGNOSIS — F41.8 DEPRESSION WITH ANXIETY: Primary | ICD-10-CM

## 2020-11-03 PROBLEM — G47.33 OSA ON CPAP: Status: ACTIVE | Noted: 2020-11-03

## 2020-11-03 PROBLEM — Z99.89 OSA ON CPAP: Status: ACTIVE | Noted: 2020-11-03

## 2020-11-03 PROCEDURE — 99213 OFFICE O/P EST LOW 20 MIN: CPT | Performed by: FAMILY MEDICINE

## 2020-11-03 NOTE — PROGRESS NOTES
VIRTUAL VISIT    Patient seen via virtual visit today for the following:  Chief Complaint   Patient presents with    Depression    Anxiety    Follow-up    Medication Check     HISTORY OF PRESENT ILLNESS   HPI  Follow up depression, anxiety and medication from 7-2020 visit. We increased her Lexapro from 10 to 20 mg. She tried that for about 2-3 weeks and got severe, intolerable sweating so she decreased it back down to 10 mg and after about a week it went away. So now she just continues on the Wellbutrin  mg and Lexapro 20 mg 1/2 tablet only once daily. She started seeing a counselor 8-2020 and has sessions q2-3 weeks which she feels has been very beneficial.  Therapist helping her w/ positive thinking and guidance on pro-health. Since then she is being more proactive about trying to take care of herself. She had sleep study last month and started on CPAP a few weeks ago. Adjusting but helpful. Sleep specialist referred her to Dr. Joe Gaytan for weight loss mgt and counseling. He prescribed Naltrexone but she has not started it yet, skeptical.  She currently feels her mood is stable and that the anxiety is improving since meeting w/ the counselor. She prefers to leave her meds as is for now and continue working on some areas of self improvement and empowerment. REVIEW OF SYMPTOMS   Review of Systems   Constitutional: Negative. Respiratory: Negative. Cardiovascular: Negative. Gastrointestinal: Negative. Psychiatric/Behavioral: Negative for suicidal ideas.            PROBLEM LIST/MEDICAL HISTORY     Problem List  Date Reviewed: 11/3/2020          Codes Class Noted    LILLIAM on CPAP ICD-10-CM: G47.33, Z99.89  ICD-9-CM: 327.23, V46.8  11/3/2020    Overview Signed 11/3/2020  8:26 AM by Martha Acevedo MD                  Morbid obesity with BMI of 45.0-49.9, adult [facility]) ICD-10-CM: E66.01, Z68.42  ICD-9-CM: 278.01, V85.42  7/10/2020        Venous insufficiency of both lower extremities ICD-10-CM: I87.2  ICD-9-CM: 459.81  10/17/2019    Overview Signed 10/17/2019  8:47 AM by Elizabeth Peoples MD     Vascular Dr. Inocencio Long             PAF (paroxysmal atrial fibrillation) [facility]) ICD-10-CM: I48.0  ICD-9-CM: 427.31  2/22/2019    Overview Signed 2/22/2019  8:28 AM by Elizabeth Peoples MD     [facility] Dr Brooks Zamora,              Depression with anxiety ICD-10-CM: F41.8  ICD-9-CM: 300.4  2/22/2019        Colonoscopy refused ICD-10-CM: Z53.20  ICD-9-CM: V64.2  2/22/2019        Obesity, morbid ([address] ICD-10-CM: E66.01  ICD-9-CM: 278.01  2/22/2018        Plantar fasciitis, bilateral ICD-10-CM: M72.2  ICD-9-CM: 728.71  2/22/2018    Overview Signed 2/22/2018 11:30 AM by Elizabeth Peoples MD     Podiatrist Dr Gamino Fearing 1-2018, orthotics             Panic attacks ICD-10-CM: F41.0  ICD-9-CM: 300.01  4/17/2017        Anxiety ICD-10-CM: F41.9  ICD-9-CM: 300.00  9/19/2016    Overview Signed 9/19/2016  2:26 PM by Elizabeth Peoples MD     [id number]             Postmenopause, LMP ~ 45 yo, 2013, No HRT ICD-10-CM: Z78.0  ICD-9-CM: V49.81  9/19/2016        Palpitations ICD-10-CM: R00.2  ICD-9-CM: 785.1  1/9/2015    Overview Signed 4/2/2015 12:40 PM by Nabil Trevino MD     1/16 event monitor, pac's otherwise unremarkable             SOB (shortness of breath) ICD-10-CM: R06.02  ICD-9-CM: 786.05  1/9/2015        History of mitral valve disorder ICD-10-CM: Z86.79  ICD-9-CM: V12.59  12/19/2013    Overview Signed 12/19/2013  3:57 PM by Elizabeth Peoples MD     In her late 19's; told it was \"benign\", no follow up             Vitamin D deficiency ICD-10-CM: E55.9  ICD-9-CM: 268.9  8/20/2013    Overview Signed 8/20/2013  2:18 PM by Elizabeth Peoples MD     2011             Perimenopause ICD-10-CM: N95.1  ICD-9-CM: 627.2  8/20/2013    Overview Signed 8/20/2013  2:21 PM by Elizabeth Peoples MD     Since 2012             Scoliosis ICD-10-CM: M41.9  ICD-9-CM: 737.30  6/28/2013 Overview Signed 6/28/2013 10:18 AM by Diego Yost MD     Since childhood; no surgery or brace; PT only             Mixed hyperlipidemia ICD-10-CM: E78.2  ICD-9-CM: 272.2  2/7/2011        Esophageal reflux ICD-10-CM: K21.9  ICD-9-CM: 530.81  2/7/2011                  PAST SURGICAL HISTORY     Past Surgical History:   Procedure Laterality Date    HX CHOLECYSTECTOMY  2019    HX COLONOSCOPY      REFUSES    HX CYST REMOVAL  13 yo         MEDICATIONS     Current Outpatient Medications   Medication Sig    buPROPion XL (WELLBUTRIN XL) 150 mg tablet TAKE 1 TABLET BY MOUTH ONCE DAILY IN THE MORNING    escitalopram oxalate (LEXAPRO) 20 mg tablet Take 1 Tab by mouth every evening. (Patient taking differently: Take 10 mg by mouth every evening. Taking 1/2 of 20 mg tab once daily)    apixaban (ELIQUIS) 5 mg tablet Take 5 mg by mouth two (2) times a day.  atorvastatin (LIPITOR) 20 mg tablet Take 20 mg by mouth daily.  CARTIA  mg ER capsule Take 120 mg by mouth daily.  COQ10, UBIQUINOL, PO Take  by mouth daily. No current facility-administered medications for this visit.            ALLERGIES     Allergies   Allergen Reactions    Bee Sting [Sting, Bee] Swelling    Crestor [Rosuvastatin] Other (comments)     Worsening ringing in ears, muscle aches, joint pain, tried again 2018, myalgias    Lipitor [Atorvastatin] Myalgia    Meloxicam Other (comments)     Inc blood pressure    Other Medication Other (comments)     Wellbutrin 300 mg---> Increased anxiety/nervousness/jittery    Prozac [Fluoxetine] Other (comments)     Dizziness and lack of coordination          SOCIAL HISTORY     Social History     Socioeconomic History    Marital status:      Spouse name: Not on file    Number of children: 0    Years of education: Not on file    Highest education level: Not on file   Occupational History    Occupation:  at the [employer]: [address]   Tobacco Use  Smoking status: Never Smoker    Smokeless tobacco: Never Used   Substance and Sexual Activity    Alcohol use: No    Drug use: No    Sexual activity: Yes     Partners: Male     Comment: Perimenopause and Rhythm   Other Topics Concern    Caffeine Concern No     Comment: 16 oz of half caff coffee, but eats alot of chocolate ~ 4-5 x a week    Weight Concern Yes     Comment: overweight, started working w/ Dr. Jessee Allen at [facility]     Special Diet No    Exercise No     Comment: nothing regular but walks the dog a few x a week x 15-20 minutes        IMMUNIZATIONS  Immunization History   Administered Date(s) Administered    Tdap 2013         FAMILY HISTORY     Family History   Problem Relation Age of Onset    Breast Cancer Mother         diagnosed in her 52's, survivor    Other Mother 64        heart arrhythmia    Heart Failure Mother     COPD Mother          age 80,     Cancer Father 61        lung    Diabetes Father         type 2    Hypertension Father     No Known Problems Sister     No Known Problems Brother     No Known Problems Brother     Breast Cancer Maternal Grandmother          in her late 55's-59's    Heart Attack Maternal Grandfather          in his mid [de-identified] from an MI    Other Paternal Grandmother          natural causes in old age   Franca Hawk Other Paternal Grandfather          in car accident    Other Sister 50        heart arrhythmia    Breast Cancer Maternal Aunt          VITALS   Vitals limited due to virtual visit. Self reported data collected today: None     PHYSICAL EXAMINATION   Physical Exam  Vitals signs reviewed. Constitutional:       General: She is not in acute distress. Appearance: She is obese. Neurological:      Mental Status: She is oriented to person, place, and time. Psychiatric:         Mood and Affect: Mood and affect normal.         Behavior: Behavior normal.         Thought Content:  Thought content normal.         Judgment: Judgment normal.             DIAGNOSTIC DATA         LABORATORY DATA     Results for orders placed or performed in visit on 02/11/20   CBC W/O DIFF   Result Value Ref Range    WBC 5.4 3.4 - 10.8 x10E3/uL    RBC 4.30 3.77 - 5.28 x10E6/uL    HGB 12.9 11.1 - 15.9 g/dL    HCT 39.8 34.0 - 46.6 %    MCV 93 79 - 97 fL    MCH 30.0 26.6 - 33.0 pg    MCHC 32.4 31.5 - 35.7 g/dL    RDW 12.5 11.7 - 15.4 %    PLATELET 419 676 - 844 x10E3/uL   LIPID PANEL   Result Value Ref Range    Cholesterol, total 151 100 - 199 mg/dL    Triglyceride 95 0 - 149 mg/dL    HDL Cholesterol 52 >39 mg/dL    VLDL, calculated 19 5 - 40 mg/dL    LDL, calculated 80 0 - 99 mg/dL   METABOLIC PANEL, COMPREHENSIVE   Result Value Ref Range    Glucose 92 65 - 99 mg/dL    BUN 15 6 - 24 mg/dL    Creatinine 0.90 0.57 - 1.00 mg/dL    GFR est non-AA 71 >59 mL/min/1.73    GFR est AA 82 >59 mL/min/1.73    BUN/Creatinine ratio 17 9 - 23    Sodium 141 134 - 144 mmol/L    Potassium 4.8 3.5 - 5.2 mmol/L    Chloride 101 96 - 106 mmol/L    CO2 26 20 - 29 mmol/L    Calcium 9.4 8.7 - 10.2 mg/dL    Protein, total 6.8 6.0 - 8.5 g/dL    Albumin 4.2 3.8 - 4.9 g/dL    GLOBULIN, TOTAL 2.6 1.5 - 4.5 g/dL    A-G Ratio 1.6 1.2 - 2.2    Bilirubin, total 0.4 0.0 - 1.2 mg/dL    Alk. phosphatase 80 39 - 117 IU/L    AST (SGOT) 19 0 - 40 IU/L    ALT (SGPT) 18 0 - 32 IU/L   TSH 3RD GENERATION   Result Value Ref Range    TSH 1.410 0.450 - 4.500 uIU/mL   CVD REPORT   Result Value Ref Range    INTERPRETATION Note             ASSESSMENT & PLAN       ICD-10-CM ICD-9-CM    1. Depression with anxiety  F41.8 300.4       Stable on current regimen of Wellbutrin  mg and Lexapro 20 mg 1/2 tablet only once daily. Reviewed medications and side effects in detail. Tolerating this regimen well. Has been intolerant of higher doses of either. She currently feels her mood is stable and that the anxiety is improving since meeting w/ the counselor.   She prefers to leave her meds as is for now and continue working on some areas of self improvement and empowerment  She started seeing a counselor 8-2020 and has sessions q2-3 weeks which she feels has been very beneficial.  Annual fasting CPE and labs due 2-2021. Follow up sooner prn  ---------------------------------------------------------------------------------------------------------------  Patient is being evaluated by a video visit encounter for concerns as above. A caregiver was present when appropriate. Due to this being a TeleHealth encounter (During TKQWP-31 public health emergency), evaluation of the following organ systems was limited: Vitals/Constitutional/EENT/Resp/CV/GI//MS/Neuro/Skin/Heme-Lymph-Imm. Pursuant to the emergency declaration under the [facility], 1135 waiver authority and the [employer] and [employer] Act, this Virtual  Visit was conducted, with patient's (and/or legal guardian's) consent, to reduce the patient's risk of exposure to COVID-19 and provide necessary medical care. Services were provided through a video synchronous discussion virtually to substitute for in-person clinic visit. Patient was located at their own home. I was in the office while conducting this encounter. Consent:  She and/or her healthcare decision maker is aware that this patient-initiated Telehealth encounter is a billable service, with coverage as determined by her insurance carrier. She is aware that she may receive a bill and has provided verbal consent to proceed: Yes  This virtual visit was conducted via [employer] me.   Pursuant to the emergency declaration under the [facility] [place], 1135 waiver authority and the [employer] and [employer] Act, this Virtual  Visit was conducted to reduce the patient's risk of exposure to COVID-19 and provide continuity of care for an established patient. Services were provided through a video synchronous discussion virtually to substitute for in-person clinic visit. Due to this being a TeleHealth evaluation, many elements of the physical examination are unable to be assessed. Total Time: minutes: 11-20 minutes.

## 2021-01-06 ENCOUNTER — HOSPITAL ENCOUNTER (OUTPATIENT)
Dept: PHYSICAL THERAPY | Age: 56
Discharge: HOME | End: 2021-01-06
Payer: COMMERCIAL

## 2021-01-06 VITALS — TEMPERATURE: 95.4 F | HEIGHT: 68 IN | WEIGHT: 293 LBS | BODY MASS INDEX: 44.41 KG/M2

## 2021-01-06 PROCEDURE — 97110 THERAPEUTIC EXERCISES: CPT

## 2021-01-06 PROCEDURE — 97140 MANUAL THERAPY 1/> REGIONS: CPT

## 2021-01-06 PROCEDURE — 97161 PT EVAL LOW COMPLEX 20 MIN: CPT

## 2021-01-06 NOTE — PROGRESS NOTES
[facility]  Lymphedema Clinic  [address], [address], [place] [address]    INITIAL EVALUATION    NAME: Wan Rosas AGE: 60 y.o. GENDER: female  DATE: 1/6/2021  REFERRING PHYSICIAN:   BERNADINE Concepcion  HISTORY AND BACKGROUND:   Primary Diagnosis:  · Lymphedema, not elsewhere classified (I89.0)  Other Treatment Diagnoses:   Other abnormalities of gait (R26.89)   Edema, unspecified (R60.9)   Venous insufficiency (I87.2)   PAF (paroxysmal atrial fibrillation) ([facility]) (I48.0)  · Obesity, morbid (HCC) (E66.01)  Date of Onset: 11/9/2020  Present Symptoms and Functional Limitations: Patient reports leg swelling has been present for at least for years but she states that her legs have always \"big. \"  She does report her swelling has been increasing the past 2 years. She does report a history of lower leg weeping intermittently but has not experienced this since her recent vascular procedure. Patient had a a vascular ablation of the greater and lesser saphenous veins in the left leg. She reports she had the procedure 4-6 weeks ago and her incision sites are healed. She received a Lymphapress about 2 months ago and she reports that this has really helped her swelling and pain. She uses it twice a day. Patient has also obtained well fitting Sig varis 20-30mmHg compression knee highs which she is wearing daily. She reports no family history of lymphedema. Patient would like to decrease her swelling further if possible. [facility] Lymphedema Assessment Scale: 17/20.   Past Medical History:   Past Medical History:   Diagnosis Date    Colonoscopy refused 2/22/2019    Depression with anxiety 2/22/2019    History of mitral valve disorder 12/19/2013    In her late 20's; told it was \"benign\", no follow up    LILLIAM on CPAP 11/3/2020        PAF (paroxysmal atrial fibrillation) ([facility] [address] 2/22/2019    Carlos Alberto Tenorio,     Panic attacks 4/17/2017    Perimenopause 8/20/2013    Since 2012    Plantar fasciitis, bilateral 2/22/2018    Podiatrist Dr Damion Montoya 1-2018, orthotics    Postmenopause, LMP ~ 45 yo, 2013, No HRT 9/19/2016    Scoliosis 6/28/2013    Venous insufficiency of both lower extremities 10/17/2019    Vascular Dr. Hadley Schmid Vitamin D deficiency 8/20/2013 2011     Past Surgical History:   Procedure Laterality Date    HX CHOLECYSTECTOMY  2019    HX COLONOSCOPY      REFUSES    HX CYST REMOVAL  13 yo     Current Medications:  Patient reports taking naltrexone 100mg, cholecalciferol, and tylenol arthritis 1300mg. Current Outpatient Medications   Medication Sig    buPROPion XL (WELLBUTRIN XL) 150 mg tablet TAKE 1 TABLET BY MOUTH ONCE DAILY IN THE MORNING    escitalopram oxalate (LEXAPRO) 20 mg tablet Take 1 Tab by mouth every evening. (Patient taking differently: Take 10 mg by mouth every evening. Taking 1/2 of 20 mg tab once daily)    apixaban (ELIQUIS) 5 mg tablet Take 5 mg by mouth two (2) times a day.  atorvastatin (LIPITOR) 20 mg tablet Take 20 mg by mouth daily.  CARTIA  mg ER capsule Take 120 mg by mouth daily.  COQ10, UBIQUINOL, PO Take  by mouth daily. No current facility-administered medications for this encounter. Allergies: Allergies   Allergen Reactions    Bee Sting [Sting, Bee] Swelling    Crestor [Rosuvastatin] Other (comments)     Worsening ringing in ears, muscle aches, joint pain, tried again 2018, myalgias    Lipitor [Atorvastatin] Myalgia    Meloxicam Other (comments)     Inc blood pressure    Other Medication Other (comments)     Wellbutrin 300 mg---> Increased anxiety/nervousness/jittery;  Lexapro >10 mg--->Increased sweating    Prozac [Fluoxetine] Other (comments)     Dizziness and lack of coordination        Prior Level of Function/Social/Work History/Personal factors and/or comorbidities impacting plan of care: Patient works full time as a  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 She is  and resides with her  who is a Carla Mcallister. .  They have 3 cats. Living Situation: Private residence   Trainable Caregiver?: Yes, but patient's  is a farmer with limited spare time  Mobility: Independent gait without any assistive device for community distances  Sleeping Arrangement:  in bed  Adaptive Equipment Owned:  crutches  Other: Patient is able to don compression stockings. Previous Therapy:  Patient has not had any previous therapy in the past for lymphedema  Compression/Lymphedema Equipment: Patient has a Lymphapress x 2 months. She has obtained Sig Varis size L3 20-30mmHg compression knee highs that she has been wearing since her ablation surgery. She self purchased at a local pharmacy. SUBJECTIVE:  The LymphaPress has really helped my legs and I do not have the pain that I had at the end of the day like I was having. I am using it twice a day and it is great. I would like to see if I can get my swelling even better. I have had some weeping off and on in the past.  I have some tender spots on my leg especially the back of the left leg. I have had these pillows of swelling. My right foot just started to swell in the past year but prior to that my feet never swelled. Patients goals for therapy: To increase mobility, decrease pain and to sleep better. OBJECTIVE DATA SUMMARY:   EXAMINATION/PRESENTATION/DECISION MAKING:   Pain:  Patient reports current pain level of 2/10 numeric scale but she reports it increases to 4-5/10 at the highest since she received the Flexitouch. Prior to receiving the Flexitouch her pain was higher in the evenings. She describes her pain as aching and heaviness in her lower legs.   Pain Scale 1: Numeric (0 - 10)  Pain Intensity 1: 2  Pain Location 1: Leg  Pain Orientation 1: Left;Right  Pain Description 1: Aching;Heaviness  Patient Stated Pain Goal: 0    Self-Care and ADLs: Modified independent to independent. Patient reports requiring increased time for some activities. Skin and Tissue Assessment:  Dermal Status: Intact and Scars-multiple, small are present along medial left leg. All sites are healed and healthy. Texture/Consistency: Boggy     Pigmentation/Color Change: Hyperpigmented    Anomalies: N/A currently but patient reports a history of lymphorrhea    Circulatory: Pulses, Varicosities and Vascular studies ruled out DVT in past    Nails: Normal- toenails have nail polish in place today    Stemmers Sign: Negative    Height:  Height: 5' 8\" (172.7 cm)  Weight:  Weight: 137.9 kg (304 lb 1.6 oz)   BMI:  BMI (calculated): 46.2  (36 or greater: adversely affecting lymphedema)  Wound/Ulcer:  N/A        Volumetric Measurements:   Right:  18,608.18mL Left:  18,145.27mL   % Difference: right lower extremity 2.49% larger than left Dominance: right   (See scanned graph)  Patient body shape and swelling distribution is indicative of a lipedema with mild to no foot swelling and ankle cuffing. Range of Motion: [facility] for bilateral lower extremities  Strength: Not formally assessed 2* patient is able to complete all functional activities in the clinic today. Sensation:  Intact     Mobility:    Bed/Chair Mobility: Independent  Transfers: Independent  Gait: Independent  Endurance: good    Safety:  Patient is alert and oriented: yes  Safety awareness: good  Fall risk?: low  Patient given written fall prevention handout: Yes     Based on the above components, the patient evaluation is determined to be of the following complexity level: LOW     Evaluation Time: 0872-7701 40 minutes    TREATMENT PROVIDED:   1. Treatment description:  Therapeutic Exercise and Procedure: Patient instructed in activity restrictions and exercise guidelines. Therapist demonstrated deep abdominal breathing and a remedial lymphedema range of motion exercise program to be done in sitting.   Patient was able to complete the routine times 5 reps and deep abdominal breathing with fair performance. Patient has been asked to complete breathing exercises and the decongestive exercise routine daily. Patient does participate in a walking program 3-4 days a week. Treatment time:  0932-0354 Minutes: 15    2. Treatment description:  Manual Therapy: Patient instructed in skin care principles and anatomy of the lymphatic system. Therapist able to demonstrate skin care protocol with low pH lotion and application following manual lymph drainage principles. Educated patient in signs and symptoms of cellulitis and to contact her physician immediately if she experiences any of those symptoms. Educated patient in how to care for skin injuries by cleaning with soap and water, applying antibiotic ointment, covering it, and monitoring for infection. Dicussed treatment components and goals for complete decongestive therapy and treatment with multi-layer compression bandaging versus maintenance with compression garments. Discussed lipidema versus lymphedema based on patient's presentation today. Discussed the difference between circular knit and flat-knit compression and demonstrated each. Discussed ordering and measuring process. Assessed fit of current non-custom circular knit knee highs with good fit noted, but patient would be better controlled in a flat-knit. Encouraged patient to continue to work with her knee highs and Lymphapress pump. Educated in the benefits of laying flat and slight leg elevation versus high elevation which is not recommended. Demonstrated donning and doffing of compression stockings and addressed patient questions about donning process with recommendation on how to improve her donning technique. Patient is expressing that she would like to pursue intensive treatment including multi-layer compression bandaging.   Explained the bandaging process and demonstrated some of the supplies, discussed wearing schedule, the need to purchase bandaging supplies, the need to teach her  bandaging techniques to help her over the weekend (patient expressed concern about his availability secondary to his work as a farmer but she reports he should be able to help her one day a week). Recommended he be present for her first treatment session to learn bandaging techniques. Patient verbalized understanding. Treatment time:  2905-6717  Minutes: 50    ASSESSMENT:   Yulia Smiley is a 60 y.o. female who presents with bilateral lower extremity swelling that presents with indicators of lipedema and phlebolymphedema. Patient is s/p ablation procedure on the left leg. She reports pain with the swelling and she reports a history of lymphorrhea. Patient is motivated and eager to improve her condition. Her condition is complicated by sleep apnea, venous insufficiency, atrial fibrillation, depression and a history of anxiety and panic attacks. Patient will benefit from complete decongestive therapy (CDT) including: Manual lymphatic drainage (MLD) technique, Short-stretch textile bandages/compression system to decongest limb and Kinesiotaping as appropriate. This care is medically necessary due to the infection risk with lymphedema and to improve functional activities. CDT is necessary to resolve swelling to allow patient to return to wearing normal clothes/footwear and prevent worsening of symptoms, such as infections and/or hospitalizations. Patient will be independent with home program strategies to allow improved ADL ability and mobility and to allow patient to return to greatest functional independence. Rehabilitation potential is considered to be Good. Factors which may influence rehabilitation potential include patient is eager to learn and motivated to improve her condition. Patient will benefit from 10-14 physical therapy visits over 10-12 weeks to optimize improvement in these areas.     PLAN OF CARE:   Recommendations and Planned Interventions: Manual lymph drainage/decongestive therapy, Multi-layer compression bandaging (short-stretch), Compression garment fitting/provision, Lymphedema therapeutic exercise, Self-care training, Functional mobility training, Education in skin care and lymphedema precautions, Self-MLD education per home program, Self-bandaging education per home program and Caregiver education as needed    GOALS  Short term goals  Time frame: 3/2/2021  1. Patient will demonstrate knowledge of signs/symptoms of infections/cellulitis and be independent in skin care to prevent cellulitis. 2.  Patient will demonstrate independence in lymphedema home program of therapeutic exercises to improve circulation and decongest limb to improve ADLs. 3.  Patient will tolerate multi-layer bandages (MLB) and show measureable decrease in limb volume to allow ordering of home compression system (daytime, nighttime garments and pump as needed). Long term goals  Time frame: 4/2/2021  1. Pt will show improvement in the [facility] Lymphedema Assessment Scale by decreasing the score to 14 and thus allow improvement in patient's quality of life. 2.  Patient will be independent with don/doff of compression system and use in order to prevent reaccumulation of fluid at discharge. 3.  Pt will be independent in self-MLD and show stable limb volumes showing decongestion and pt. ready for transition to independent restorative phase of lymphedema therapy. Patient has participated in goal setting and agrees to work toward plan of care. Patient was instructed to call if questions or concerns arise. Thank you for this referral.  TALIB Gutierrez, HONEY     Time Calculation: 105 mins    TREATMENT PLAN EFFECTIVE DATES:   1/6/2021 TO 4/2/2021  I have read the above plan of care for Mohit Rothman. I certify the above prescribed services are required by this patient and are medically necessary.   The above plan of care has been developed in conjunction with the lymphedema/physical therapist.       Physician Signature: ____________________________________Date:______________                                   BERNADINE Suarez

## 2021-01-31 DIAGNOSIS — F41.8 DEPRESSION WITH ANXIETY: ICD-10-CM

## 2021-01-31 RX ORDER — BUPROPION HYDROCHLORIDE 150 MG/1
TABLET ORAL
Qty: 90 TAB | Refills: 1 | Status: SHIPPED | OUTPATIENT
Start: 2021-01-31 | End: 2021-08-01

## 2021-02-01 ENCOUNTER — APPOINTMENT (OUTPATIENT)
Dept: PHYSICAL THERAPY | Age: 56
End: 2021-02-01
Payer: COMMERCIAL

## 2021-02-04 ENCOUNTER — HOSPITAL ENCOUNTER (OUTPATIENT)
Dept: PHYSICAL THERAPY | Age: 56
Discharge: HOME | End: 2021-02-04
Payer: COMMERCIAL

## 2021-02-04 PROCEDURE — 97140 MANUAL THERAPY 1/> REGIONS: CPT

## 2021-02-04 NOTE — PROGRESS NOTES
[facility]  Lymphedema Clinic  [address] [facility], [address], [place]  [address]    LYMPHEDEMA THERAPY  VISIT: 2    []                  Daily note               [x]                 30 day/10th visit progress note    NAME: Gama Rowe  DATE: 2/4/2021    GOALS  Short term goals  Time frame: 3/2/2021  1. Patient will demonstrate knowledge of signs/symptoms of infections/cellulitis and be independent in skin care to prevent cellulitis. Reviewed skin care with low pH lotion. 2.  Patient will demonstrate independence in lymphedema home program of therapeutic exercises to improve circulation and decongest limb to improve ADLs. Patient was educated in deep abdominal breathing and remedial exercises last treatment session. Not reviewed today. 3.  Patient will tolerate multi-layer bandages (MLB) and show measureable decrease in limb volume to allow ordering of home compression system (daytime, nighttime garments and pump as needed). Initiated multi-layer compression bandaging today and initiated patient education in self bandaging with patient redemonstrating good bandaging techniques in the clinic today.        Long term goals  Time frame: 4/2/2021  1. Pt will show improvement in the [facility] Lymphedema Assessment Scale by decreasing the score to 14 and thus allow improvement in patient's quality of life. 2.  Patient will be independent with don/doff of compression system and use in order to prevent reaccumulation of fluid at discharge. 3.  Pt will be independent in self-MLD and show stable limb volumes showing decongestion and pt. ready for transition to independent restorative phase of lymphedema therapy       SUBJECTIVE REPORT: I am going to try to bandage myself because my husbands time is so limited. I looked up lipedema and I am a textbook case.      Pain: Patient does not report pain when asked today but later in the session she did report some intermittent knee pain. Pain Scale 1: Numeric (0 - 10)  Pain Intensity 1: 0  Pain Location 1: Leg  Pain Orientation 1: Left;Right  Patient Stated Pain Goal: 0    Gait:  Independent gait without any assistive device for community distances  Transfers: Sit to stand with Independent using no assistive devices. Fall risk: low    ADLs: Independent and Modified independent with bathing and dressing depending on the activity. Treatment Response:  Patient reviewed packet received at evaluation. Patient brought in all required supplies for treatment. [facility] Lymphedema Assessment Scale:  deferred  TREATMENT AND OBJECTIVE DATA SUMMARY:     Therapeutic Exercise/Procedure 0 minutes   Treatment time: N/A  Walking program Patient encouraged to participate in a walking program up to 25 minutes per day as tolerated. Libboo exercise program: Not reviewed with patient today   Stick exercise program: N/A    Free exercises/ROM: Patient was educated in remedial home exercise program and deep abdominal breathing on a previous visit. She is encouraged to perform her exercises daily. Exercises were not reviewed today due to education in multi-layer compression bandaging. Home program: Patient to perform daily to BID:  Skin care, Deep abdominal breathing, Exercise routine, Walking program, Rest in supine, Compression bandage, Compression garments, Vasopneumatic device, Bring supplies to each therapy visit and Purchase necessary supplies   Rationale: Exercise will increase the lymph angiomotoricity and tissue pressure of the skin and thus decrease swelling. Modalities 0 minutes   Treatment time: N/A  Vasopneumatic pump: Vasopneumatic pump is in place for home use. Patient is using pump per recommendations at home. Recommended patient use her pump when she removes her bandaging for bathing prior to rewrapping.      Manual Lymphatic Drainage (MLD) 90 minutes   Treatment time: 7979-5053  Patient/family education provided in self MLD. deferred for education in multi-layer compression bandaging   Area to decongest: LE: bilateral   Sequence used and effectiveness: Deferred today   Skin/wound care/debridement: Reviewed skin care principles:   Performed skin care with low pH lotion following manual lymph principles. Skin care products- applied Remedy lotion to bilateral lower legs and reviewed manual lymph drainage application principles  Patient has obtained Lubriderm for home use. Applied multi-layer compression bandaging to: Initiated patient education in application of multi-layer compression bandaging today via demonstration x 3 times and patient redemonstration while seated on the edge of the bed. Patient also recorded the techniques for reference when bandaging at home. bilateral  lower extremity: below knee    The following multi-layer bandages were applied to each leg:  Tricofix      Padding layer:  Fleece    Foam:  10 cm roll    Short stretch bandages:   8 cm  10 cm  12 cm    Education:   Patient/caregiver in multi-layer bandaging donning principles. , Precautions., Supply ordering and types of bandaging. and Instructed in bandage laundering and lifespan as well as how this technique can be used as part of a long term management routine    Patient re-demonstrated bandaging with good technique in the clinic. She will just need to be careful with her tightness. Instructed patient to rebandage every 2-3 days depending how well the bandage is staying in place and encouraged her to use her compression pump when she is unwrapped. Discussed being out of the bandages no longer than 2-3 hours. Discussed the possibility of thigh bandaging. Will reassess patient next week to determine progression of multi-layer bandaging based on patient response. Upper/Lower extremity compression:  Patient will be measured for compression garments when swelling status permits.    Kinesiotaping: N/A   Girth/Volume measurement:  Not reassessed today. Height:     Weight:      BMI:     (36 or greater: adversely affecting lymphedema)    ASSESSMENT:   Patient returned for first follow-up visit today since her evaluation. Initiated treatment and she was educated in self multi-layer compression bandaging today. Patient was able to self bandage in the clinic with good technique. She left the clinic with bandaging in place and she will self bandage this weekend. Will reassess her technique and swelling status next week and continue with progress of her compression bandaging and education in self management techniques. Patient remains motivated and continues to do well in above home programs. Patient is eager to learn and improve on condition. Patient is making gains towards goals. PLAN OF CARE:   Continue plan of care as established on evaluation.    Frequency:  2 times a week   Next session will address: Assess bandaging  Exercise    MLD  Education bandaging and self management techniques   Other:  N/A      TOTAL TREATMENT 90 mins      TALIB Aguirre, HONEY

## 2021-02-08 ENCOUNTER — HOSPITAL ENCOUNTER (OUTPATIENT)
Dept: PHYSICAL THERAPY | Age: 56
Discharge: HOME | End: 2021-02-08
Payer: COMMERCIAL

## 2021-02-08 PROCEDURE — 97140 MANUAL THERAPY 1/> REGIONS: CPT

## 2021-02-08 PROCEDURE — 97110 THERAPEUTIC EXERCISES: CPT

## 2021-02-08 NOTE — PROGRESS NOTES
[address], [address] [place] [address]    LYMPHEDEMA THERAPY  VISIT: 3    [x]                  Daily note               []                 30 day/10th visit progress note    NAME: Uma Gil  DATE: 2/8/2021    GOALS  Short term goals  Time frame: 3/2/2021  1. Patient will demonstrate knowledge of signs/symptoms of infections/cellulitis and be independent in skin care to prevent cellulitis. Reviewed skin care with low pH lotion. 2.  Patient will demonstrate independence in lymphedema home program of therapeutic exercises to improve circulation and decongest limb to improve ADLs. Patient was educated in deep abdominal breathing and remedial exercises last treatment session. Educated in   [address] today. 3.  Patient will tolerate multi-layer bandages (MLB) and show measureable decrease in limb volume to allow ordering of home compression system (daytime, nighttime garments and pump as needed). Initiated multi-layer compression bandaging last session and educated patient in self bandaging with patient redemonstrating good bandaging techniques in the clinic. She had some issues with pain with multi-layer bandaging in place even with rebandaging over the weekend. So modifications were made to bandaging technique today in response to pain reports and educated patient in bandaging changes.     Long term goals  Time frame: 4/2/2021  1. Pt will show improvement in the [facility] Lymphedema Assessment Scale by decreasing the score to 14 and thus allow improvement in patient's quality of life. 2.  Patient will be independent with don/doff of compression system and use in order to prevent reaccumulation of fluid at discharge. 3.  Pt will be independent in self-MLD and show stable limb volumes showing decongestion and pt. ready for transition to independent restorative phase of lymphedema therapy. SUBJECTIVE REPORT: I kept your bandage on and rewrapped on the weekend but I had some pain in the foot and the lower legs in both legs. When I re-bandaged on Saturday night, I had foot pain again and it seems like it starts after I have been in the bandaging about 5 hours. I am not sure what to do. I had to take the bandage off during the night. I was really careful to not pull too tightly when I wrapped myself. I think I want to rebandage daily so I can use the machine. Pain: Patient does report tenderness to touch in left foot and she states that when she was having pain with the bandaging it was 2-3/10 numeric scale. She did report lower leg aching pain when she was bandaged as well. .Pain Scale 1: Numeric (0 - 10)  Pain Intensity 1: 2  Pain Location 1: Foot  Pain Orientation 1: Left;Right  Patient Stated Pain Goal: 0    Gait:  Independent gait without any assistive device for community distances  Transfers: Sit to stand with Independent using no assistive devices. Fall risk: low    ADLs: Independent and Modified independent with bathing and dressing depending on the activity. Treatment Response:  Patient reviewed packet received at evaluation. Patient brought in all required supplies for treatment. Patient remained in therapist compression bandaging into the weekend but she reports experiencing some pain with bandaging in place. She rebandaged as instructed on Saturday and reports having to remove bandaging during the night secondary to the pain. [facility] Lymphedema Assessment Scale:  deferred  TREATMENT AND OBJECTIVE DATA SUMMARY:     Therapeutic Exercise/Procedure 15 minutes   Treatment time: 8665-4894  Walking program Patient encouraged to participate in a walking program up to 25 minutes per day as tolerated. [employer] exercise program: Patient completed education in sitting and supine lower extremity.      Educated patient in sitting and supine [employer] exercises following the home program handout. Modified the sitting passing the ball under the legs to only marching from a seated position. Patient was able to complete all exercises with minimal cues for technique. Patient did require some additional cueing for deep abdominal breathing technique today. Provided patient with information and guidance for obtaining a Alyne Proper for home use. Stick exercise program: N/A    Free exercises/ROM: Patient was educated in remedial home exercise program and deep abdominal breathing on a previous visit. She is encouraged to perform her exercises daily. Exercises were not reviewed today due to education in multi-layer compression bandaging. Home program: Patient to perform daily to BID:  Skin care, Deep abdominal breathing, Exercise routine, Walking program, Rest in supine, Compression bandage, Compression garments, Vasopneumatic device, Bring supplies to each therapy visit and Purchase necessary supplies   Rationale: Exercise will increase the lymph angiomotoricity and tissue pressure of the skin and thus decrease swelling. Modalities 0 minutes   Treatment time: N/A  Vasopneumatic pump: Vasopneumatic pump is in place for home use. Patient is using pump per recommendations at home. Recommended patient use her pump when she removes her bandaging for bathing prior to rewrapping. Patient is opting to try bandaging herself daily so she can pursue a session on her vasopneumatic device daily. Manual Lymphatic Drainage (MLD) 90 minutes   Treatment time: 6144-8712  Patient/family education provided in self MLD. deferred for education and modification in multi-layer compression bandaging   Area to decongest: LE: bilateral   Sequence used and effectiveness: Deferred today   Skin/wound care/debridement: Reviewed skin care principles:   Performed skin care with low pH lotion following manual lymph principles.    Skin care products- applied Remedy lotion to bilateral lower legs and reviewed manual lymph drainage application principles  Patient has obtained Lubriderm for home use and she reports she has also ordered Remedy lotion. Applied multi-layer compression bandaging to:  Continued patient education in application of multi-layer compression bandaging today to address patient pain reports and concerns about fitting into shoes for her work scheduled. Tried multiple modifications to bandaging technique to adjust padding and pressure on the dorsum of the foot and educated patient in each option via demonstration and discussion so she can continue to try other options when she rebandages at home. Discussed with patient that the multi-layer bandages should not cause pain and if pain reoccurs she should remove the bandages and try another modification that she was shown. Also recommended that patient try managing with just one leg bandaged versus 2 and patient feels this is also a good idea. Demonstrated how to modifying padding to the left foot with cellona pad on the foot (demonstrated how to construct the pad and increase padding if needed), added foam to left foot with rosidal soft base of toes to knee (patient reports this was the most comfortable) and komprex 2 pad which seemed to cause pain, modified fleece and netting placement and reviewed options. Educated patient in problem solving options with multi-layer bandaging.    The following technique seemed to be the most comfortable for patient today:  left  lower extremity: below knee    The following multi-layer bandages were applied to each leg:  Cellona pad to dorsum of left foot followed by donning of   Tricofix      Padding layer:  Fleece    Foam:  10 cm roll base of toes to knee (provided patient with additional foam roll pieces to achieve full leg coverage)    Short stretch bandages: (removed 10cm bandage from her bandaging technique and only applied the 8cm short stretch bandage to her foot with 12cm applied from ankle to knee  8 cm  12 cm    Education:   Patient/caregiver in multi-layer bandaging donning principles. , Precautions., Supply ordering and types of bandaging. and Instructed in bandage laundering and lifespan as well as how this technique can be used as part of a long term management routine. Educated patient in today's bandaging modifications and discussed changes she can make at home if symptoms persist.  She was able to fit into her shoes today with left lower extremity bandaging in place. Patient would like to re-bandage daily and she was instructed that that routine would be very good for her and she can then use her vasopneumatic compression pump daily. Discussed the possibility of thigh bandaging last week, but will continue to work on lower leg bandaging tolerance at this time before considering thigh techniques. Patient left the clinic with left lower leg multi-layer compression bandaging in place. Upper/Lower extremity compression:  Patient will be measured for compression garments when swelling status permits. Kinesiotaping: N/A   Girth/Volume measurement:  Reassessed left leg girths as follows:      Left (cm)  1/6/21 Left (cm) 2/8/21   Ankle   30.1cm     29.8cm      8cm from ankle     37.0cm     36.2cm   20 cm from ankle  54.5cm     51.8cm      28cm     55.3cm     54.7cm        Height:     Weight:      BMI:     (36 or greater: adversely affecting lymphedema)    ASSESSMENT:   Patient returned to the clinic after working with multi-layer bandaging over the weekend and re-bandaging herself on Saturday. She reports issues with bandaging tolerance and increased pain with bandaging in place especially at the feet. Modified bandaging technique to increase padding and decrease compression in the left foot. Also recommended patient have just one leg bandaged today and don her compression stocking on the right leg.   Patient was agreeable to all recommendations and did not report pain once the modified bandage was applied today. Educated patient in bandaging modifications for home self application and patient is to work with these modifications to see if they decrease her pain. Will reassess multi-layer compression  bandaging tolerance on Thursday and will progress patient as appropriate. Patient remains motivated and continues to do well in above home programs. Patient is eager to learn and improve on condition. Patient is making gains towards goals. PLAN OF CARE:   Continue plan of care as established on evaluation. Modify multi-layer bandaging as needed.   Frequency:  2 times a week   Next session will address: Assess bandaging and make modifications as needed  Exercise    MLD  Education in bandaging and self management techniques   Other:  N/A      TOTAL TREATMENT 90 mins      TALIB Mann, HONEY

## 2021-02-11 ENCOUNTER — HOSPITAL ENCOUNTER (OUTPATIENT)
Dept: PHYSICAL THERAPY | Age: 56
Discharge: HOME | End: 2021-02-11
Payer: COMMERCIAL

## 2021-02-11 PROCEDURE — 97140 MANUAL THERAPY 1/> REGIONS: CPT

## 2021-02-11 NOTE — PROGRESS NOTES
[facility]  Lymphedema Clinic  [address] [facility], [address] [place] [address]    LYMPHEDEMA THERAPY  VISIT: 4    [x]                  Daily note               []                 30 day/10th visit progress note    NAME: Severiano Rincon  DATE: 2/11/2021    GOALS  Short term goals  Time frame: 3/2/2021  1. Patient will demonstrate knowledge of signs/symptoms of infections/cellulitis and be independent in skin care to prevent cellulitis. Reviewed skin care with low pH lotion. 2.  Patient will demonstrate independence in lymphedema home program of therapeutic exercises to improve circulation and decongest limb to improve ADLs. Patient was educated in deep abdominal breathing and remedial exercises last treatment session. Educated in   [address] today. 3.  Patient will tolerate multi-layer bandages (MLB) and show measureable decrease in limb volume to allow ordering of home compression system (daytime, nighttime garments and pump as needed). Continued multi-layer compression bandaging modifications and educated patient in self bandaging with modifications with patient redemonstrating good bandaging techniques in the clinic. She continues to have some issues with pain with multi-layer bandaging in place so reviewed bandaging technique today to address problems.        Long term goals  Time frame: 4/2/2021  1. Pt will show improvement in the [facility] Lymphedema Assessment Scale by decreasing the score to 14 and thus allow improvement in patient's quality of life. 2.  Patient will be independent with don/doff of compression system and use in order to prevent reaccumulation of fluid at discharge. 3.  Pt will be independent in self-MLD and show stable limb volumes showing decongestion and pt. ready for transition to independent restorative phase of lymphedema therapy.  Initiated education in self manual lymph drainage to the trunk and bilateral inguinal regions today. Patient redemonstrated with verbal cues and handout was provided. SUBJECTIVE REPORT:  My foot didn't hurt with your bandage in place but the next day the calf part of the bandage started to slide down. I rebandaged myself but I had to take it off during the night because my foot hurt again. I am not sure what I am doing differently than when you wrap me. I do want to continue to try the bandaging because I want to get my legs smaller. I watched a webinar on lymphedema last night and learned about some of the manual lymph drainage. Pain: Patient does report tenderness to touch in left foot and she states that when she was having pain with the bandaging it was 2-3/10 numeric scale. .Pain Scale 1: Numeric (0 - 10)  Pain Intensity 1: 2  Pain Location 1: Foot  Pain Orientation 1: Left;Right  Patient Stated Pain Goal: 0    Gait:  Independent gait without any assistive device for community distances  Transfers: Sit to stand with Independent using no assistive devices. Fall risk: low    ADLs: Independent and Modified independent with bathing and dressing depending on the activity. Treatment Response:  Patient reviewed packet received at evaluation. Patient brought in all required supplies for treatment. Patient has been trying to rebandage at home but is experiencing foot pain when she is bandaging. She arrived wearing her sig varis compression knee highs in place of the bandaging today. [facility] Lymphedema Assessment Scale:  deferred  TREATMENT AND OBJECTIVE DATA SUMMARY:     Therapeutic Exercise/Procedure 0 minutes   Treatment time: N/A  Walking program Patient encouraged to participate in a walking program up to 25 minutes per day as tolerated. Sabi[employer] ball exercise program: Patient has been educated in the [employer] routine . and she is attempting to locate a ball for home use.  Previously educated patient in sitting and supine [employer] exercises following the home program handout. Modified the sitting passing the ball under the legs to only marching from a seated position. Patient was able to complete all exercises with minimal cues for technique. Provided patient with information and guidance for obtaining a Xander Mariusz for home use. Stick exercise program: N/A    Free exercises/ROM: Patient was educated in remedial home exercise program and deep abdominal breathing on a previous visit. She is encouraged to perform her exercises daily. Exercises were not reviewed today due to education in multi-layer compression bandaging. Home program: Patient to perform daily to BID:  Skin care, Deep abdominal breathing, Exercise routine, Walking program, Rest in supine, Compression bandage, Compression garments, Vasopneumatic device, Self manual lymph drainage (MLD), Bring supplies to each therapy visit and Purchase necessary supplies   Rationale: Exercise will increase the lymph angiomotoricity and tissue pressure of the skin and thus decrease swelling. Modalities 0 minutes   Treatment time: N/A  Vasopneumatic pump: Vasopneumatic pump is in place for home use. Patient is using pump per recommendations at home. Recommended patient use her pump when she removes her bandaging for bathing prior to rewrapping. Patient is opting to try bandaging herself daily so she can pursue a session on her vasopneumatic device daily. Educated in self manual lymph drainage today and discussed performance of techniques prior to or at the start of using her compression pump. Manual Lymphatic Drainage (MLD) 95 minutes   Treatment time: [id number]  Patient/family education provided in self MLD. Initiated patient education in self manual lymph drainage today with demonstration and patient redemonstration. Patient required some verbal cues for performance.   She was provided with a home program handout and educated on the benefits of performing trunk manuall lymph drainage techniques and deep abdominal breathing prior to or at the onset of using her vasopneumatic compression pump. Patient verbalized understanding. Area to decongest: LE: bilateral   Sequence used and effectiveness: Secondary sequence for lower extremities with trunk involvement. Skin/wound care/debridement: Reviewed skin care principles:   Performed skin care with low pH lotion following manual lymph principles. Skin care products- applied Remedy lotion to bilateral lower legs and reviewed manual lymph drainage application principles  Patient has obtained Lubriderm and Remedy lotion for home use. Applied multi-layer compression bandaging to:  Continued patient education in application of multi-layer compression bandaging today as patient continues to have difficulty with bandaging tolerance secondary to reports of foot pain when bandaged. Patient does have a history of plantar fasciitis. She did not experience pain in her foot when therapist bandaged her so opted to review and practiced bandaging with patient again today. Therapist demonstrated bandaging technique for patient with addition of the 10cm bandage back in to the bandaging from ankle to knee to increase lower leg bandage suspension. Kellyt then redemonstrated bandaging. With paitent demonstration therapist observed that patient had an extra layer of bandaging on the foot despite only now using the 8cm bandaging on the foot and this most likely is causing patient' s increased foot pain. Therapist educated patient on the differences in techniques with demonstration followed by patient redemonstration x 2. Patient was able to bandage herself correctly and reported no foot pain in the clinic.   She requested for therapist to bandage both legs today and bandaging was applied as follows:  bilateral  lower extremity: below knee    The following multi-layer bandages were applied to each leg:  Cellona pad to dorsum of left foot followed by donning of Tricofix      Padding layer:  Fleece    Foam:  10 cm roll base of toes to knee (provided patient with additional foam roll pieces to achieve full leg coverage)    Short stretch bandages: only used the 8cm bandage on the foot and bandaged with 10cm and 12 cm from the ankle to the knee only. 8 cm  10 cm  12 cm    Education:   Patient/caregiver in multi-layer bandaging donning principles. , Precautions., Supply ordering and types of bandaging. and Instructed in bandage laundering and lifespan as well as how this technique can be used as part of a long term management routine. Patient would like to re-bandage daily and she was instructed that that routine would be very good for her and she can then use her vasopneumatic compression pump daily. Discussed the possibility of thigh bandaging last week, but will continue to work on lower leg bandaging tolerance at this time before considering thigh techniques. Patient left the clinic with bilateral lower leg multi-layer compression bandaging in place. Upper/Lower extremity compression:  Patient will be measured for compression garments when swelling status permits. Kinesiotaping: N/A   Girth/Volume measurement:   Deferred today for bandaging modification and review. Height:     Weight:      BMI:     (36 or greater: adversely affecting lymphedema)    ASSESSMENT:   Patient returned to the clinic after working with multi-layer bandaging since last visit and she reports that she did not have foot pain with therapist's bandage but she did have recurrent foot pain when she bandaged herself. Opted to modify bandaging technique to add an additional short stretch bandage to from the ankle to the knee to improve suspension. Practiced bandaging once again with patient today and therapist was able to correct patient technique that was most likely causing her foot pain.   Resumed multi-layer bandaging and patient left the clinic with bandaging in place bilaterally. Patient would like to continue to work with the bandaging technique to meet her goal of decreasing her swelling. Initiated education in self manual lymph drainage today. Patient remains motivated and continues to do well in above home programs. Patient is eager to learn and improve on condition. Patient is making gains towards goals. PLAN OF CARE:   Continue plan of care as established on evaluation. Modify multi-layer bandaging as needed. Initiate education in compression garment options.   Frequency:  2 times a week   Next session will address: Assess bandaging and make modifications as needed  Exercise    MLD  Education in bandaging and self management techniques  Reassess leg volumes   Other:  N/A      TOTAL TREATMENT 95 mins      TALIB Carter, KARISHMA-KAIA

## 2021-02-16 ENCOUNTER — HOSPITAL ENCOUNTER (OUTPATIENT)
Dept: PHYSICAL THERAPY | Age: 56
Discharge: HOME | End: 2021-02-16
Payer: COMMERCIAL

## 2021-02-16 PROCEDURE — 97140 MANUAL THERAPY 1/> REGIONS: CPT

## 2021-02-16 NOTE — PROGRESS NOTES
[address], [address] Cheryl Alexis  20. LYMPHEDEMA THERAPY 
VISIT: 5 
  [x]                  Daily note               []                 30 day/10th visit progress note NAME: Jennyfer Moore DATE: 2/16/2021 GOALS Short term goals Time frame: 3/2/2021 1. Patient will demonstrate knowledge of signs/symptoms of infections/cellulitis and be independent in skin care to prevent cellulitis. Reviewed skin care with low pH lotion. Patient is knowledgeable is skin care and performing at home with Remedy lotion. Goal met 2/16/2021. 
2.  Patient will demonstrate independence in lymphedema home program of therapeutic exercises to improve circulation and decongest limb to improve ADLs. Patient was educated in deep abdominal breathing, remedial exercises, and Sabiha Ball exercises during previous  treatment session. 3.  Patient will tolerate multi-layer bandages (MLB) and show measureable decrease in limb volume to allow ordering of home compression system (daytime, nighttime garments and pump as needed). Continued multi-layer compression bandaging with modifications with addition of thigh bandaging today and educated patient in self bandaging with modifications. Patient redemonstrated good bandaging techniques in the clinic. She continues to have some issues with foot pain with multi-layer bandaging in place, but it has improved with modified bandaging technique. Long term goals Time frame: 4/2/2021 1. Pt will show improvement in the [address] Lymphedema Assessment Scale by decreasing the score to 14 and thus allow improvement in patient's quality of life. 2.  Patient will be independent with don/doff of compression system and use in order to prevent reaccumulation of fluid at discharge. 3.  Pt will be independent in self-MLD and show stable limb volumes showing decongestion and pt. ready for transition to independent restorative phase of lymphedema therapy. Initiated education in self manual lymph drainage to the trunk and bilateral inguinal regions last session and reviewed thigh techniques today. Patient redemonstrated with verbal cues and handout was provided. Left leg limb volume has decreased by 784.62ml and right leg has decreased by 856.67ml since 1/6/2021. SUBJECTIVE REPORT:   Those bandaging changes and reviewing bandaging helped. I have been bandaging and the wraps I have on today, I put on yesterday. I am still having some foot pain but when I rewrap it improves. The bandaging definitely helps and my legs and feet were smaller this weekend. Pain: Patient does report pain intermittently in her feet especially at night and she rates it as 6/10 numeric scale . [facility] Pain Scale 1: Numeric (0 - 10) Pain Intensity 1: 0 Gait:  Independent gait without any assistive device for community distances Transfers: Sit to stand with Independent using no assistive devices. Fall risk: low ADLs: Independent and Modified independent with bathing and dressing depending on the activity. Treatment Response:  Patient reviewed packet received at evaluation. Patient brought in all required supplies for treatment. Patient has been applying multi-laye rbandaging at home and arrived with bandaging in place today. [facility] Lymphedema Assessment Scale:  deferred TREATMENT AND OBJECTIVE DATA SUMMARY:  
 
Therapeutic Exercise/Procedure 0 minutes Treatment time: N/A Walking program Patient encouraged to participate in a walking program up to 25 minutes per day as tolerated. [employer] exercise program: Patient has been educated in the [employer] routine . and she is attempting to locate a ball for home use. Previously educated patient in sitting and supine [place] [employer] exercises following the home program handout. Provided patient with information and guidance for obtaining a [place] for home use. Stick exercise program: N/A Free exercises/ROM: Patient was educated in remedial home exercise program and deep abdominal breathing on a previous visit. She is encouraged to perform her exercises daily. Exercises were not reviewed today due to education in multi-layer compression bandaging. Home program: Patient to perform daily to BID: 
Skin care, Deep abdominal breathing, Exercise routine, Walking program, Rest in supine, Compression bandage, Compression garments, Vasopneumatic device, Self manual lymph drainage (MLD), Bring supplies to each therapy visit and Purchase necessary supplies Rationale: Exercise will increase the lymph angiomotoricity and tissue pressure of the skin and thus decrease swelling. Modalities 0 minutes Treatment time: N/A Vasopneumatic pump: Vasopneumatic pump is in place for home use. Patient is using pump per recommendations at home. Recommended patient use her pump when she removes her bandaging for bathing prior to rewrapping. Patient is opting to try bandaging herself daily so she can pursue a session on her vasopneumatic device daily. Educated in self manual lymph drainage last session and discussed performance of techniques prior to or at the start of using her compression pump. Manual Lymphatic Drainage (MLD) 95 minutes Treatment time: 6382-6714 Patient/family education provided in self MLD. Continued patient education in self manual lymph drainage today with demonstration and patient redemonstration with review of medial thigh techniques today per patient questions. She has a home program handout and has been educated on the benefits of performing trunk manuall lymph drainage techniques and deep abdominal breathing prior to or at the onset of using her vasopneumatic compression pump. Patient reports she is performing the techniques at home. Area to decongest: LE: bilateral  
Sequence used and effectiveness: Secondary sequence for lower extremities with trunk involvement. Skin/wound care/debridement: Reviewed skin care principles:  
Patient's extremity bathed with soap and water. Performed skin care with low pH lotion following manual lymph principles. Skin care products- applied Remedy lotion to bilateral lower legs. Patient has obtained Lubriderm and Remedy lotion for home use. Applied multi-layer compression bandaging to:  Continued patient education in application of multi-layer compression bandaging today to add thigh bandaging to her techniques. Patient arrived with lower legs in multi-layer bandaging and bandaging is applied extremely well. Therapist demonstrated bandaging technique for patient for the left thigh and paitent then redemonstrated bandaging. It will be difficult for patient to work in thigh bandaging so discussed bandaging in the evenings and at night and recommended patient alternate thighs for bandaging. bilateral  lower extremity: below knee 
left  lower extremity: thigh high The following multi-layer bandages were applied to each leg: 
Tricofix  to knee Padding layer: 
Fleece lower leg only Foam: 
10 cm roll 15 cm roll  10cm from base of toes to knee (provided patient with additional foam roll pieces to achieve full leg coverage), 15cm to the knee and thigh Short stretch bandages: only used the 8cm bandage on the foot and bandaged with 10cm and 12 cm from the ankle to the knee only. 8 cm 
10 cm 
12 cm Added additional 12cm double roll and 1 12 cm single roll for thigh bandaging Education:  
Patient/caregiver in multi-layer bandaging donning principles. , Precautions., Supply ordering and types of bandaging. and Instructed in bandage laundering and lifespan as well as how this technique can be used as part of a long term management routine. Upper/Lower extremity compression:  Patient will be measured for compression garments when swelling status permits. Kinesiotaping: N/A Girth/Volume measurement:  
 Reviewed results of volumetric measurements taken on evaluation. -right lower 17,751.51 ml today compared to 18,608.18 ml on 1/6/21.  
 
-left lower  17,360.65 ml today compared to 18,145.27 ml on 1/6/21. Percent difference today is 2.20% right larger than left as compared to 2.49% on evaluation. Height:     Weight:     
BMI:    
(36 or greater: adversely affecting lymphedema) ASSESSMENT:  
Patient returned to the clinic with multi layer bandaging in place and applied well. Limb volumes are decreased bilaterally today and no increases in thigh swelling are measured. Patient is opting to proceed with intermittent thigh bandaging and she was educated in the techniques today. She was able to bandage herself from a seated position. Patient may be ready to be measured for compression garments next week. Patient remains motivated and continues to do well in above home programs. Patient is eager to learn and improve on condition. Patient is making gains towards goals. PLAN OF CARE:  
Continue plan of care as established on evaluation. Modify multi-layer bandaging as needed. Initiate education in compression garment options. Frequency:  2 times a week Next session will address: Assess bandaging and make modifications as needed Exercise MLD Education in bandaging and self management techniques Reassess leg volumes Other:  N/A   
 
TOTAL TREATMENT 95 mins TALIB Velez, KARISHMA-KAIA

## 2021-02-18 ENCOUNTER — APPOINTMENT (OUTPATIENT)
Dept: PHYSICAL THERAPY | Age: 56
End: 2021-02-18
Payer: COMMERCIAL

## 2021-02-22 ENCOUNTER — HOSPITAL ENCOUNTER (OUTPATIENT)
Dept: PHYSICAL THERAPY | Age: 56
Discharge: HOME | End: 2021-02-22
Payer: COMMERCIAL

## 2021-02-22 PROCEDURE — 97140 MANUAL THERAPY 1/> REGIONS: CPT

## 2021-02-22 NOTE — PROGRESS NOTES
[facility]  Lymphedema Clinic  [address] [place], [address], Cheryl  20.    LYMPHEDEMA THERAPY  VISIT: 6    [x]                  Daily note               []                 30 day/10th visit progress note    NAME: Tracy Powell  DATE: 2/22/2021    GOALS  Short term goals  Time frame: 3/2/2021  1. Patient will demonstrate knowledge of signs/symptoms of infections/cellulitis and be independent in skin care to prevent cellulitis. Reviewed skin care with low pH lotion. Patient is knowledgeable is skin care and performing at home with Remedy lotion. Goal met 2/16/2021.  2.  Patient will demonstrate independence in lymphedema home program of therapeutic exercises to improve circulation and decongest limb to improve ADLs. Patient was educated in deep abdominal breathing, remedial exercises, and Sabiha Ball exercises during previous  treatment session. 3.  Patient will tolerate multi-layer bandages (MLB) and show measureable decrease in limb volume to allow ordering of home compression system (daytime, nighttime garments and pump as needed). Continued multi-layer compression bandaging with modifications with patient now bandaging her left thigh at night. Patient is skilled at bandaging. She continues to have some issues with foot pain with multi-layer bandaging in place, but it has improved with modified bandaging technique. Measured for daytime compression garments today. Will measure for night garments next session. Long term goals  Time frame: 4/2/2021  1. Pt will show improvement in the [facility] Lymphedema Assessment Scale by decreasing the score to 14 and thus allow improvement in patient's quality of life. 2.  Patient will be independent with don/doff of compression system and use in order to prevent reaccumulation of fluid at discharge.   3.  Pt will be independent in self-MLD and show stable limb volumes showing decongestion and pt. ready for transition to independent restorative phase of lymphedema therapy. Initiated education in self manual lymph drainage to the trunk and bilateral inguinal regions last session and reviewed thigh techniques last session. Patient redemonstrated with verbal cues and handout was provided. Left leg limb volume has decreased by 784.62ml and right leg has decreased by 856.67ml since 1/6/2021. SUBJECTIVE REPORT:  My legs look so much better and they are lot smaller. I so glad that I was able to get in here and I am so glad I am able to bandage myself. I kind of like it because I feel like I am helping myself. Pain: Patient does report pain intermittently in her feet especially at night and she rates it as 6/10 numeric scale . No complaints of foot pain in the clinic today. .Pain Scale 1: Numeric (0 - 10)  Pain Intensity 1: 0  Pain Location 1: Foot  Pain Orientation 1: Left;Right  Patient Stated Pain Goal: 0    Gait:  Independent gait without any assistive device for community distances  Transfers: Sit to stand with Independent using no assistive devices. Fall risk: low    ADLs: Independent and Modified independent with bathing and dressing depending on the activity. Treatment Response:  Patient reviewed packet received at evaluation. Patient brought in all required supplies for treatment. Patient has been applying multi-layer bandaging at home and arrived with bandaging in place today. [address] Lymphedema Assessment Scale:  deferred  TREATMENT AND OBJECTIVE DATA SUMMARY:     Therapeutic Exercise/Procedure 0 minutes   Treatment time: N/A  Walking program Patient encouraged to participate in a walking program up to 25 minutes per day as tolerated. c-crowd ball exercise program: Patient has been educated in the [employer] routine . and she is attempting to locate a ball for home use.  Previously educated patient in sitting and supine [employer] exercises following the home program handout. Provided patient with information and guidance for obtaining a [employer] for home use. Stick exercise program: N/A    Free exercises/ROM: Patient was educated in remedial home exercise program and deep abdominal breathing on a previous visit. She is encouraged to perform her exercises daily. Exercises were not reviewed today due to education in multi-layer compression bandaging. Home program: Patient to perform daily to BID:  Skin care, Deep abdominal breathing, Exercise routine, Walking program, Rest in supine, Compression bandage, Compression garments, Vasopneumatic device, Self manual lymph drainage (MLD), Bring supplies to each therapy visit and Purchase necessary supplies   Rationale: Exercise will increase the lymph angiomotoricity and tissue pressure of the skin and thus decrease swelling. Modalities 0 minutes   Treatment time: N/A  Vasopneumatic pump: Vasopneumatic pump is in place for home use. Patient is using pump per recommendations at home. Recommended patient use her pump when she removes her bandaging for bathing prior to rewrapping. Patient is opting to try bandaging herself daily so she can pursue a session on her vasopneumatic device daily. Educated in self manual lymph drainage during a previous session and discussed performance of techniques prior to or at the start of using her compression pump. Manual Lymphatic Drainage (MLD) 95 minutes   Treatment time: 7962-6098  Patient/family education provided in self MLD. Previously educated patient in self manual lymph drainage with demonstration and patient redemonstration. She has a home program handout and has been educated on the benefits of performing trunk manuall lymph drainage techniques and deep abdominal breathing prior to or at the onset of using her vasopneumatic compression pump. Patient reports she is performing the techniques at home. Not reviewed today due to compression garment measuring.    Area to decongest: LE: bilateral   Sequence used and effectiveness: Secondary sequence for lower extremities with trunk involvement. Skin/wound care/debridement: Reviewed skin care principles:   Patient's extremity bathed with soap and water. Performed skin care with low pH lotion following manual lymph principles. Skin care products- applied Remedy lotion to bilateral lower legs. Patient has obtained Lubriderm and Remedy lotion for home use. Applied multi-layer compression bandaging to:   Patient arrived with lower legs in multi-layer bandaging and bandaging is applied extremely well. Patient reports she is performing thigh bandaging on her left thigh at night, but would not be able to wear the bandaging to work. bilateral  lower extremity: below knee  left  lower extremity: thigh high    The following multi-layer bandages were applied to each leg:  Tricofix  to knee    Padding layer:  Fleece lower leg only    Foam:  10 cm roll  10cm from base of toes to knee (provided patient with additional foam roll pieces to achieve full leg coverage)Short stretch bandages: only used the 8cm bandage on the foot and bandaged with 10cm and 12 cm from the ankle to the knee only. 8 cm  10 cm  12 cm     Education:   Patient/caregiver in multi-layer bandaging donning principles. , Precautions., Supply ordering and types of bandaging. and Instructed in bandage laundering and lifespan as well as how this technique can be used as part of a long term management routine. Upper/Lower extremity compression:   Upper/Lower extremity compression: Measured for the following compression products:    LE: bilateral bilateral lower extremity: Knee high    Style:   Flat-knit    Brand:  [employer]    Type:  Custom:       Vendor:   [employer]    Education:  Return and reordering process (by bringing prior garments into the clinic). Educated patient in flat-knit brand and garment options.   Patient is agreeable to try the new [employer]idence line which is recommended for lipedema. Reviewed fitting and remake process with 30 day time period to address remakes. Therapist also took measurements for the Sig Varis Comprecapri to be worn with the knee highs. Patient requests the color black for the knee highs. Discussed night compression garment options and patient is thinking of  rebeca and knee high combination. Will measure for these products next session. Kinesiotaping: N/A   Girth/Volume measurement:    Reviewed results of volumetric measurements taken on evaluation. -right lower 17,751.51 ml on 2/16/21 compared to 18,608.18 ml on 1/6/21.     -left lower  17,360.65 ml on 2/16/21 compared to 18,145.27 ml on 1/6/21. Percent difference on 2/16/21 was 2.20% right larger than left as compared to 2.49% on evaluation. Reassessed lower extremity girths today as follows:    Affected Side: Bilateral   Left (cm) Right (cm)   5th Tuberosity  22. 6( was 23.2 1/6/21)        22.3 (was 23.7 1/6/21)   Ankle  28.2 ( was 30.1 1/6/21)     27.6 (was 29.5 1/6/21)      12cm  38. 3(was 43.8)     39.4 ( was 41.3)      20cm  48.2 (was 54.50)     48.7 (was 53.3)      28cm  53.5 (was 55.3)     55.2 (was 57.3)      36cm  52.0 ( was 52.0)     51.6 (was 55.8)      48cm  67.8 (was 69.7)     68.0 (was 71.5)      56cm 71.2 (was 73.8) 73.3 (was 76.2)   64cm 74.6 (was 76.6) 76.6 (was 76. 3)             Height:     Weight:      BMI:     (36 or greater: adversely affecting lymphedema)    ASSESSMENT:   Patient returned to the clinic with multi layer bandaging in place and applied well. Limb girths are decreased bilaterally today and patient is ready to be measured for daytime compression products. Patient was measured for daytime products today and she will be measured for the night products on Thursday. Patient is continueing with intermittent thigh bandaging and self manual lymph drainage along with pump use at home between session.    .    Patient remains motivated and continues to do well in above home programs. Patient is eager to learn and improve on condition. Patient is making gains towards goals. PLAN OF CARE:   Continue plan of care as established on evaluation. Modify multi-layer bandaging as needed. Measure for night compression products next session.   Frequency:  2 times a week   Next session will address: Assess bandaging and make modifications as needed  Exercise    MLD  Education in bandaging and self management techniques  Reassess leg volumes   Other:  [facility] [place]     TOTAL TREATMENT 95 mins      Luli Marshall, TALIB, KARISHMA-KAIA

## 2021-02-25 ENCOUNTER — HOSPITAL ENCOUNTER (OUTPATIENT)
Dept: PHYSICAL THERAPY | Age: 56
Discharge: HOME | End: 2021-02-25
Payer: COMMERCIAL

## 2021-02-25 PROCEDURE — 97140 MANUAL THERAPY 1/> REGIONS: CPT

## 2021-02-25 NOTE — PROGRESS NOTES
[facility]  Lymphedema Clinic  [address], [address], [address]    LYMPHEDEMA THERAPY  VISIT: 7    [x]                  Daily note               []                 30 day/10th visit progress note    NAME: Chandni Bledsoe  DATE: 2/25/2021    GOALS  Short term goals  Time frame: 3/2/2021  1. Patient will demonstrate knowledge of signs/symptoms of infections/cellulitis and be independent in skin care to prevent cellulitis. Reviewed skin care with low pH lotion. Patient is knowledgeable is skin care and performing at home with Remedy lotion. Goal met 2/16/2021.  2.  Patient will demonstrate independence in lymphedema home program of therapeutic exercises to improve circulation and decongest limb to improve ADLs. Patient was educated in deep abdominal breathing, remedial exercises, and Sabiha Ball exercises for lower extremities during previous  treatment session. 3.  Patient will tolerate multi-layer bandages (MLB) and show measureable decrease in limb volume to allow ordering of home compression system (daytime, nighttime garments and pump as needed). Continued multi-layer compression bandaging with modifications with patient now bandaging her left thigh at night. Patient is skilled at bandaging. She continues to have some issues with foot pain with multi-layer bandaging in place, but it has improved with modified bandaging technique. Measured for daytime compression garments last session and custom night garments today. Goal met 2/25/2021. Long term goals  Time frame: 4/2/2021  1. Pt will show improvement in the [facility] Lymphedema Assessment Scale by decreasing the score to 14 and thus allow improvement in patient's quality of life. 2.  Patient will be independent with don/doff of compression system and use in order to prevent reaccumulation of fluid at discharge.   3.  Pt will be independent in self-MLD and show stable limb volumes showing decongestion and pt. ready for transition to independent restorative phase of lymphedema therapy. Continued education in self manual lymph drainage for lower extremities and added upper extremities today per patient concerns about arm swelling. Patient redemonstrated with verbal cues and handout was provided. Left leg limb volume had decreased by 784.62ml and right leg has decreased by 856.67ml on last assessment since 1/6/2021. SUBJECTIVE REPORT:   I get really hot at night so I am not sure what I will tolerate with the night garments. I was thinking I wanted to do the rebeca but I wondering if I should do the thigh highs now? They already called me about the stocking orders and they are in production. Pain: Patient does report pain intermittently in her feet especially at night and she rates it as 6/10 numeric scale . She does reports some soreness from her corns today on bilateral feet. .Pain Scale 1: Numeric (0 - 10)  Pain Intensity 1: 0  Patient Stated Pain Goal: 0    Gait:  Independent gait without any assistive device for community distances  Transfers: Sit to stand with Independent using no assistive devices. Fall risk: low    ADLs: Independent and Modified independent with bathing and dressing depending on the activity. Treatment Response:  Patient reviewed packet received at evaluation. Patient brought in all required supplies for treatment. Patient has been applying multi-layer bandaging at home and arrived with bandaging in place today. [facility] Lymphedema Assessment Scale:  deferred  TREATMENT AND OBJECTIVE DATA SUMMARY:     Therapeutic Exercise/Procedure 0 minutes   Treatment time: N/A  Walking program Patient encouraged to participate in a walking program up to 25 minutes per day as tolerated. [place] exercise program: Patient has been educated in the [employer] routine . and she is attempting to locate a ball for home use.  Previously educated patient in sitting and supine Orourke Pastel exercises following the home program handout. Provided patient with information and guidance for obtaining a Orourke Pastel for home use. Stick exercise program: N/A    Free exercises/ROM: Patient was educated in remedial home exercise program and deep abdominal breathing on a previous visit. She is encouraged to perform her exercises daily. Exercises were not reviewed today due to education in multi-layer compression bandaging. Discussed how to incorporate exercises into her day without having to set aside time for the activities I.e exercises during tv programs you normally watch and deep breathing when stopped at stop lights. Home program: Patient to perform daily to BID:  Skin care, Deep abdominal breathing, Exercise routine, Walking program, Rest in supine, Compression bandage, Compression garments, Vasopneumatic device, Self manual lymph drainage (MLD), Bring supplies to each therapy visit and Purchase necessary supplies   Rationale: Exercise will increase the lymph angiomotoricity and tissue pressure of the skin and thus decrease swelling. Modalities 0 minutes   Treatment time: N/A  Vasopneumatic pump: Vasopneumatic pump is in place for home use. Patient is using pump per recommendations at home. Recommended patient use her pump when she removes her bandaging for bathing prior to rewrapping. Patient is opting to try bandaging herself daily so she can pursue a session on her vasopneumatic device daily. Educated in self manual lymph drainage during a previous session and discussed performance of techniques prior to or at the start of using her compression pump. Manual Lymphatic Drainage (MLD) 100 minutes   Treatment time: 4215-3814  Patient/family education provided in self MLD. Previously educated patient in self manual lymph drainage with demonstration and patient redemonstration.   She has a home program handout and has been educated on the benefits of performing trunk manuall lymph drainage techniques and deep abdominal breathing prior to or at the onset of using her vasopneumatic compression pump. Patient reports she is performing the techniques at home. Reviewed lower extremity techniques today and instructed patient in bilateral upper extremity techniques due to reports of arm swelling and the risk of arm swelling with lipedma. Provided patient with handout of techniques and she redemonstrated each technique with cues. Area to decongest: UE: bilateral LE: bilateral   Sequence used and effectiveness: Secondary sequence for lower extremities with trunk involvement. Secondary sequence for upper extremity with trunk involvement. Skin/wound care/debridement: Reviewed skin care principles:   Patient's extremity bathed with soap and water. Performed skin care with low pH lotion following manual lymph principles. Skin care products- applied Remedy lotion to bilateral lower legs. Patient has obtained Lubriderm and Remedy lotion for home use. Applied multi-layer compression bandaging to:   Patient arrived with lower legs in multi-layer bandaging and bandaging is applied extremely well. Patient reports she is performing thigh bandaging alternating legs but she reports she was so hot last night that she had to take it off of her right leg. Therapist removed bandaging for measuring, bathed legs and reapplied bandaging as follows;  bilateral  lower extremity: below knee  left  lower extremity: thigh high    The following multi-layer bandages were applied to each leg:  Tricofix  to knee    Padding layer:  Fleece lower leg only    Foam:  10 cm roll  10cm from base of toes to knee (provided patient with additional foam roll pieces to achieve full leg coverage)Short stretch bandages: only used the 8cm bandage on the foot and bandaged with 10cm and 12 cm from the ankle to the knee only.     8 cm  10 cm  12 cm     Education:   Patient/caregiver in multi-layer bandaging donning principles. , Precautions., Supply ordering and types of bandaging. and Instructed in bandage laundering and lifespan as well as how this technique can be used as part of a long term management routine. Upper/Lower extremity compression:   Upper/Lower extremity compression: Measured for the following compression products:    LE: bilateral bilateral lower extremity: Thigh high    Style:   Flat-knit and Foam based    Brand:  Solaris Confidence    Type:  Custom: thigh length night garments with 2 compression jackets fo reach leg, color request purple    Vendor:   [employer]    Education:  Educated patient in compression garment donning and doffing. Garment lifespan. Return and reordering process (by bringing prior garments into the clinic). Discussed length possibilities for the Solaris product and patient opted to do thigh high length today. Patient was measured for  the new [employer] Confidence line of stockings for daytime wear and order is in progress. Therapist also took measurements for the Sig Varis Comprecapri to be worn with the knee highs. Patient requests the color black for the knee highs. Kinesiotaping: N/A   Girth/Volume measurement: Deferred full reassessment for garment measuring today. -right lower 17,751.51 ml on 2/16/21 compared to 18,608.18 ml on 1/6/21.     -left lower  17,360.65 ml on 2/16/21 compared to 18,145.27 ml on 1/6/21. Percent difference on 2/16/21 was 2.20% right larger than left as compared to 2.49% on evaluation. Height:     Weight:      BMI:     (36 or greater: adversely affecting lymphedema)    ASSESSMENT:   Patient returned to the clinic with multi layer bandaging in place and applied well. Measured for bilateral lower extremity custom thigh high foam based night compression garments today. Patient was measured for daytime products last session and that order is in production.      Patient is continuing with intermittent thigh bandaging and self manual lymph drainage along with pump use at home between session. Educated her in upper extremity self manual lymph drainage today. Will transition to the new compression garments upon delivery. Patient remains motivated and continues to do well in above home programs. Patient is eager to learn and improve on condition. Patient is making gains towards goals. PLAN OF CARE:   Continue plan of care as established on evaluation. Modify multi-layer bandaging as needed.    Frequency:  2 times a week   Next session will address: Assess bandaging and make modifications as needed  Exercise    MLD  Education in bandaging and self management techniques  Reassess leg volumes   Other:  [facility]     TOTAL TREATMENT 100 mins      TALIB Eaton, CLT-KAIA

## 2021-03-01 ENCOUNTER — HOSPITAL ENCOUNTER (OUTPATIENT)
Dept: PHYSICAL THERAPY | Age: 56
Discharge: HOME | End: 2021-03-01
Payer: COMMERCIAL

## 2021-03-01 PROCEDURE — 97110 THERAPEUTIC EXERCISES: CPT

## 2021-03-01 PROCEDURE — 97140 MANUAL THERAPY 1/> REGIONS: CPT

## 2021-03-01 NOTE — PROGRESS NOTES
[address] [place] [address]  Lymphedema Clinic  [facility], [address] [place] [address]    LYMPHEDEMA THERAPY  VISIT: 8    []                  Daily note               [x]                 30 day/10th visit progress note    NAME: Jennyfer Moore  DATE: 3/1/2021    GOALS  Short term goals  Time frame: 3/2/2021  1. Patient will demonstrate knowledge of signs/symptoms of infections/cellulitis and be independent in skin care to prevent cellulitis. Reviewed skin care with low pH lotion. Patient is knowledgeable in skin care and performing at home with Remedy lotion. Goal met 2/16/2021.  2.  Patient will demonstrate independence in lymphedema home program of therapeutic exercises to improve circulation and decongest limb to improve ADLs. Patient was educated in deep abdominal breathing, remedial exercises, and Sabiha Ball exercises for lower extremities during previous  treatment session. Patient was independent with [place] exercises today. Goal met 3/1/2021.  3.  Patient will tolerate multi-layer bandages (MLB) and show measureable decrease in limb volume to allow ordering of home compression system (daytime, nighttime garments and pump as needed). Continued multi-layer compression bandaging with modifications with patient now bandaging her left thigh at night. Patient is skilled at bandaging. She continues to have some issues with foot pain with multi-layer bandaging in place, but it has improved with modified bandaging technique. Measured for daytime compression garments last session and custom night garments today. Goal met 2/25/2021. Long term goals  Time frame: 4/2/2021  1. Pt will show improvement in the [address] [place] [address] Lymphedema Assessment Scale by decreasing the score to 14 and thus allow improvement in patient's quality of life.   2.  Patient will be independent with don/doff of compression system and use in order to prevent reaccumulation of fluid at discharge. 3.  Pt will be independent in self-MLD and show stable limb volumes showing decongestion and pt. ready for transition to independent restorative phase of lymphedema therapy. Continued education in self manual lymph drainage for lower extremities and added upper extremities today per patient concerns about arm swelling. Patient redemonstrated with verbal cues and handout was provided. Left leg limb volume has decreased by 1395.45ml and right leg has decreased by 1586.16ml  since evaluation 1/6/2021. SUBJECTIVE REPORT:   So I am going to need knee replacements. I am seeing another Orthopedic surgeon in April. I am worried that the surgery will make my swelling worse again since my swelling is so much better. I am moving better now that the swelling has decreased. Pain: Patient reports bilateral knee pain with a baseline report of 4/10 numeric scale that is constant and she reports when she \"disturbs her meniscus\" it is worse at 6/10 numeric scale and it takes several days to improve back to 4/10 numeric scale. .Pain Scale 1: Numeric (0 - 10)  Pain Intensity 1: 4  Pain Location 1: Knee  Patient Stated Pain Goal: 0    Gait:  Independent gait without any assistive device for community distances  Transfers: Sit to stand with Independent using no assistive devices. Fall risk: low    ADLs: Independent and Modified independent with bathing and dressing depending on the activity. Treatment Response:  Patient reviewed packet received at evaluation. Patient brought in all required supplies for treatment. Patient has been applying multi-layer bandaging at home and arrived with bandaging in place today. [facility] Lymphedema Assessment Scale:  deferred  TREATMENT AND OBJECTIVE DATA SUMMARY:     Therapeutic Exercise/Procedure 30 minutes   Treatment time: 7829-7082  Walking program Patient encouraged to participate in a walking program up to 25 minutes per day as tolerated.     Sofy Arias ball exercise program: Patient has been educated in the [employer] routine . Reviewed lower extremity [employer] exercises and patient demonstrated: independently. Reviewed upper extremity [employer] exercises and patient demonstrated: independently. She is working on  locating a ball for home use. Provided patient with information and guidance for obtaining a [employer] for home use and discussed options. Provided patient with copies of both the upper and lower extremity [employer] home programs today. Stick exercise program: N/A    Free exercises/ROM: Patient was educated in remedial home exercise program and deep abdominal breathing on a previous visit. She is encouraged to perform her exercises daily. Exercises were not reviewed today due to education in multi-layer compression bandaging. Discussed how to incorporate exercises into her day without having to set aside time for the activities I.e exercises during tv programs you normally watch and deep breathing when stopped at stop lights. Home program: Patient to perform daily to BID:  Skin care, Deep abdominal breathing, Exercise routine, Walking program, Rest in supine, Compression bandage, Compression garments, Vasopneumatic device, Self manual lymph drainage (MLD), Bring supplies to each therapy visit and Purchase necessary supplies   Rationale: Exercise will increase the lymph angiomotoricity and tissue pressure of the skin and thus decrease swelling. Modalities 0 minutes   Treatment time: N/A  Vasopneumatic pump: Vasopneumatic pump is in place for home use. Patient is using pump per recommendations at home. Recommended patient use her pump when she removes her bandaging for bathing prior to rewrapping. Patient is opting to try bandaging herself daily so she can pursue a session on her vasopneumatic device daily.   Educated in self manual lymph drainage during a previous session and discussed performance of techniques prior to or at the start of using her compression pump. Manual Lymphatic Drainage (MLD) 65 minutes   Treatment time: 0057-3679  Patient/family education provided in self MLD. Previously educated patient in self manual lymph drainage with demonstration and patient redemonstration. She has a home program handout and has been educated on the benefits of performing trunk manuall lymph drainage techniques and deep abdominal breathing prior to or at the onset of using her vasopneumatic compression pump. Patient reports she is performing the techniques at home. Patient has been educated in upper extremity manual lymph drainage as well. Provided patient with handout of techniques and she redemonstrated each technique with cues. Area to decongest: UE: bilateral LE: bilateral   Sequence used and effectiveness: Secondary sequence for lower extremities with trunk involvement. Secondary sequence for upper extremity with trunk involvement. Deferred today for review of home exercises. Skin/wound care/debridement: Reviewed skin care principles:   Patient's extremity bathed with soap and water. Performed skin care with low pH lotion following manual lymph principles. Skin care products- applied Remedy lotion to bilateral lower legs. Patient has obtained Lubriderm and Remedy lotion for home use. Applied multi-layer compression bandaging to:   Patient arrived with lower legs in multi-layer bandaging and bandaging is applied extremely well. Patient reports she is not currently performing thigh bandaging.   Therapist removed bandaging for measuring, bathed legs and reapplied bandaging as follows;  bilateral  lower extremity: below knee  left  lower extremity: thigh high    The following multi-layer bandages were applied to each leg:  Tricofix  to knee    Padding layer:  Fleece lower leg only    Foam:  10 cm roll  10cm from base of toes to knee (provided patient with additional foam roll pieces to achieve full leg coverage)Short stretch bandages: only used the 8cm bandage on the foot and bandaged with 10cm and 12 cm from the ankle to the knee only. 8 cm  10 cm  12 cm     Education:   Patient/caregiver in multi-layer bandaging donning principles. , Precautions., Supply ordering and types of bandaging. and Instructed in bandage laundering and lifespan as well as how this technique can be used as part of a long term management routine. Upper/Lower extremity compression:   Upper/Lower extremity compression: Measured for the following compression products previously and order has been placed:    LE: bilateral bilateral lower extremity: Thigh high    Style:   Flat-knit and Foam based    Brand:  Solaris Confidence    Type:  Custom: thigh length night garments with 2 compression jackets fo reach leg, color request purple    Vendor:   [facility] [facility]    Education:  Educated patient in compression garment donning and doffing. Garment lifespan. Return and reordering process (by bringing prior garments into the clinic). Discussed length possibilities for the Solaris product and patient opted to do thigh high length today. Patient was measured for  the new [employer] Confidence line of stockings for daytime wear and order is in progress. Therapist also took measurements for the Sig Varis Comprecapri to be worn with the knee highs. Patient requests the color black for the knee highs. Will fit for all of these products upon delivery. Kinesiotaping: N/A   Girth/Volume measurement:  Reassessed bilateral lower extremities as follows:    -right lower 17,022.02ml compared to 18,608.18 ml on 1/6/21.     -left lower  16,749.82 ml compared to 18,145.27 ml on 1/6/21. Percent difference is 1.60% right larger than left as compared to 2.49% right larger than left on evaluation.        Height:     Weight:      BMI:     (36 or greater: adversely affecting lymphedema)    ASSESSMENT:   Patient returned to the clinic with multi layer bandaging in place and applied well. Reassessed volumes with significant volume decreases present bilaterally. Patient's day and night compression garment orders are in progress and will fit for all products upon delivery. Patient continues to perform self manual lymph drainage along with pump use at home between sessions. She is also pursuing a Kylee Latoya for home use. Will transition to the new compression garments upon delivery. Patient remains motivated and continues to do well in above home programs. Patient is eager to learn and improve on condition. Patient is making gains towards goals with all short term goals now met. PLAN OF CARE:   Continue plan of care as established on evaluation. Modify multi-layer bandaging as needed.    Frequency:  2 times a week   Next session will address: Assess bandaging and make modifications as needed  Exercise    MLD  Education in bandaging and self management techniques   Other:  [facility]     TOTAL TREATMENT 95 mins      Adri Alvarado, TALIB, HONEY

## 2021-03-09 ENCOUNTER — HOSPITAL ENCOUNTER (OUTPATIENT)
Dept: PHYSICAL THERAPY | Age: 56
Discharge: HOME | End: 2021-03-09
Payer: COMMERCIAL

## 2021-03-09 PROCEDURE — 97140 MANUAL THERAPY 1/> REGIONS: CPT

## 2021-03-09 NOTE — PROGRESS NOTES
[facility]  Lymphedema Clinic  [address] [facility], [address], [facility] 22.    LYMPHEDEMA THERAPY  VISIT: 9    [x]                  Daily note               [x]                 30 day/10th visit progress note    NAME: Socorro Cobb  DATE: 3/9/2021    GOALS  Short term goals  Time frame: 3/2/2021  1. Patient will demonstrate knowledge of signs/symptoms of infections/cellulitis and be independent in skin care to prevent cellulitis. Reviewed skin care with low pH lotion. Patient is knowledgeable in skin care and performing at home with Remedy lotion. Goal met 2/16/2021.  2.  Patient will demonstrate independence in lymphedema home program of therapeutic exercises to improve circulation and decongest limb to improve ADLs. Patient was educated in deep abdominal breathing, remedial exercises, and Sabiha Ball exercises for lower extremities during previous  treatment session. Patient was independent with Kylee Latoya exercises today. Goal met 3/1/2021.  3.  Patient will tolerate multi-layer bandages (MLB) and show measureable decrease in limb volume to allow ordering of home compression system (daytime, nighttime garments and pump as needed). Continued multi-layer compression bandaging with modifications with patient now bandaging her left thigh at night. Patient is skilled at bandaging. She continues to have some issues with foot pain with multi-layer bandaging in place, but it has improved with modified bandaging technique. Measured for daytime compression garments last session and custom night garments today. Goal met 2/25/2021. Long term goals  Time frame: 4/2/2021  1. Pt will show improvement in the [facility] Lymphedema Assessment Scale by decreasing the score to 14 and thus allow improvement in patient's quality of life.   2.  Patient will be independent with don/doff of compression system and use in order to prevent reaccumulation of fluid at discharge. 3.  Pt will be independent in self-MLD and show stable limb volumes showing decongestion and pt. ready for transition to independent restorative phase of lymphedema therapy. Continued education in self manual lymph drainage for lower extremities and added upper extremities per patient concerns about arm swelling. Patient redemonstrated with verbal cues and handout was provided. Left leg limb volume has decreased by 1395.45ml and right leg has decreased by 1586.16ml  since evaluation 1/6/2021. SUBJECTIVE REPORT:   I got my night garments delivered and I have been wearing them at night but they seem a little loose in the thighs. They fit really well in the lower legs. I got the rebeca too but it rides down in the crotch area. Pain: Patient reports bilateral knee pain with a baseline report of 4/10 numeric scale that is constant and she reports when she \"disturbs her meniscus\" it is worse at 6/10 numeric scale and it takes several days to improve back to 4/10 numeric scale. .Pain Scale 1: Numeric (0 - 10)  Pain Intensity 1: 4  Pain Location 1: Knee  Patient Stated Pain Goal: 0    Gait:  Independent gait without any assistive device for community distances  Transfers: Sit to stand with Independent using no assistive devices. Fall risk: low    ADLs: Independent and Modified independent with bathing and dressing depending on the activity. Treatment Response:  Patient reviewed packet received at evaluation. Patient brought in all required supplies for treatment. Patient has been applying multi-layer bandaging at home and arrived with bandaging in place today that she applied this morning. [facility] Lymphedema Assessment Scale:  deferred  TREATMENT AND OBJECTIVE DATA SUMMARY:     Therapeutic Exercise/Procedure 0 minutes   Treatment time: N/A  Walking program Patient encouraged to participate in a walking program up to 25 minutes per day as tolerated.     Sabiha ball exercise program: Patient has been educated in the [employer] routine . She is working on  locating a ball for home use. Provided patient with information and guidance for obtaining a [employer] for home use and discussed options. Provided patient with copies of both the upper and lower extremity [employer] home programs today. Stick exercise program: N/A    Free exercises/ROM: Patient was previously educated in remedial home exercise program and deep abdominal breathing on a previous visit. She is encouraged to perform her exercises daily. Discussed how to incorporate exercises into her day without having to set aside time for the activities i.e exercises during tv programs you normally watch and deep breathing when stopped at stop lights. Home program: Patient to perform daily to BID:  Skin care, Deep abdominal breathing, Exercise routine, Walking program, Rest in supine, Compression bandage, Compression garments, Vasopneumatic device, Self manual lymph drainage (MLD), Bring supplies to each therapy visit and Purchase necessary supplies   Rationale: Exercise will increase the lymph angiomotoricity and tissue pressure of the skin and thus decrease swelling. Modalities 0 minutes   Treatment time: N/A  Vasopneumatic pump: Vasopneumatic pump is in place for home use. Patient is using pump per recommendations at home. Recommended patient use her pump when she removes her bandaging for bathing prior to rewrapping. Patient is opting to try bandaging herself daily so she can pursue a session on her vasopneumatic device daily. Educated in self manual lymph drainage during a previous session and discussed performance of techniques prior to or at the start of using her compression pump. Manual Lymphatic Drainage (MLD) 90 minutes   Treatment time: 7383-0123  Patient/family education provided in self MLD. Reviewed self manual lymph drainage with demonstration and patient redemonstration.   She has a home program handout and has been educated on the benefits of performing trunk manual lymph drainage techniques and deep abdominal breathing prior to or at the onset of using her vasopneumatic compression pump. Patient reports she is performing the techniques at home. Addressed questions and patient redemonstrated some of the techniques today during manual lymph drainge session. Patient has been educated in upper extremity manual lymph drainage as well. Provided patient with handout of techniques previously. Area to decongest: UE: bilateral LE: bilateral   Sequence used and effectiveness: Secondary sequence for lower extremities with trunk involvement. Secondary sequence for upper extremity with trunk involvement. Performed manual lymph drainage with patient in supine. Performed techniques from trunk and abdomen to knees bilaterally with increased time spent on the thighs. Skin/wound care/debridement: Reviewed skin care principles:   Patient's extremity bathed with soap and water. Performed skin care with low pH lotion following manual lymph principles. Skin care products- applied Remedy lotion to bilateral lower legs. Patient has obtained Lubriderm and Remedy lotion for home use. Applied multi-layer compression bandaging to:   Patient arrived with lower legs in multi-layer bandaging and bandaging was applied this am and is done extremely well. Therapist opted not to remove and reapply bandaging in the clinic today due to they wear just applied this am and they are correctly in place. Focused on manual lymph drainage and garment education. Patient arrived wearing compression bandaging as follows.   bilateral  lower extremity: below knee    The following multi-layer bandages were applied to each leg:  Tricofix  to knee    Padding layer:  Fleece lower leg only    Foam:  10 cm roll  10cm from base of toes to knee (provided patient with additional foam roll pieces to achieve full leg coverage)Short stretch bandages: only used the 8cm bandage on the foot and bandaged with 10cm and 12 cm from the ankle to the knee only. 8 cm  10 cm  12 cm        . Upper/Lower extremity compression:   Upper/Lower extremity compression: Measured for the following compression products previously and order has been placed:    LE: bilateral bilateral lower extremity: Thigh high    Style:   Flat-knit and Foam based    Brand:  [employer] Confidence    Type:  Custom: thigh length night garments with 2 compression jackets fo reach leg, color request purple    Vendor:   [employer]    Education:  Educated patient in compression garment donning and doffing. Garment lifespan. Return and reordering process (by bringing prior garments into the clinic). Discussed length possibilities for the [employer] product and patient opted to do thigh high length today. Patient was measured for  the new [employer] line of stockings for daytime wear and order is in progress. Therapist also took measurements for the Sig Varis Comprecapri to be worn with the knee highs. Patient requests the color black for the knee highs. Patient reports she received the night compression garments and she has been wearing them at night. She did not bring them in for fit assessment today and she reports the lower legs fit well but the thighs are a bit loose. Instructed patient to bring them in for therapist assessment and possible return for alteration if thighs are too loose for her. She verbalized understanding. Patient reports she also received the rebeca but reports it slides down at her crotch with wear. Advised patient to bring it in for assessment. Patient does wear scrubs for the day and sometimes the slippery material of both products encouraged this style of garment to slide. Will assess further. Demonstrated [employer] mircro massaging compression garments for patient as they are often recommended for lipedema syndrome patients.   Patient likes the material and therapist will investigate options for sizing. Patient is instructed to bring in her custom knee highs for therapist to fit her for them once they are delivered. Kinesiotaping: N/A   Girth/Volume measurement:  Deferred today for manual lymph drainage treatments. Height:     Weight:      BMI:     (36 or greater: adversely affecting lymphedema)    ASSESSMENT:   Patient returned to the clinic with multi layer bandaging in place and applied well. Patient has begun to receive some of her compression products but did not bring them in for fitting today. She has not received her daytime garments yet. Instructed her to bring in all of her products for therapist to assess next visit as it appears the [employer] night garment will benefit from alteration in the thighs. Will transition to the new compression garments once correct fit is achieved. Performed manual lymph drainage today and reviewed self manual lymph drainage techniques. Patient remains motivated and continues to do well in above home programs. Patient is eager to learn and improve on condition. Patient is making gains towards goals with all short term goals now met. PLAN OF CARE:   Continue plan of care as established on evaluation. Fit for compression garments.    Frequency:  1-2 visits per week   Next session will address: Assess bandaging and make modifications as needed  MLD  Garment fitting  Education in bandaging and self management techniques   Other:  [facility]     TOTAL TREATMENT 90 mins      TALIB Phillips, HONEY

## 2021-03-18 ENCOUNTER — HOSPITAL ENCOUNTER (OUTPATIENT)
Dept: PHYSICAL THERAPY | Age: 56
Discharge: HOME | End: 2021-03-18
Payer: COMMERCIAL

## 2021-03-18 PROCEDURE — 97140 MANUAL THERAPY 1/> REGIONS: CPT

## 2021-03-18 NOTE — PROGRESS NOTES
[facility]  Lymphedema Clinic  [address], [address], [place]  22.    LYMPHEDEMA THERAPY  VISIT: 10    [x]                  Daily note               []                 30 day/10th visit progress note    NAME: Modesta Buckner  DATE: 3/18/2021    GOALS  Short term goals  Time frame: 3/2/2021  1. Patient will demonstrate knowledge of signs/symptoms of infections/cellulitis and be independent in skin care to prevent cellulitis. Reviewed skin care with low pH lotion. Patient is knowledgeable in skin care and performing at home with Remedy lotion. Goal met 2/16/2021.  2.  Patient will demonstrate independence in lymphedema home program of therapeutic exercises to improve circulation and decongest limb to improve ADLs. Patient was educated in deep abdominal breathing, remedial exercises, and Sabiha Ball exercises for lower extremities during previous  treatment session. Patient was independent with Ex[place] Franc exercises today. Goal met 3/1/2021.  3.  Patient will tolerate multi-layer bandages (MLB) and show measureable decrease in limb volume to allow ordering of home compression system (daytime, nighttime garments and pump as needed). Continued multi-layer compression bandaging with modifications with patient now bandaging her left thigh at night. Patient is skilled at bandaging. She continues to have some issues with foot pain with multi-layer bandaging in place, but it has improved with modified bandaging technique. Measured for daytime compression garments last session and custom night garments today. Goal met 2/25/2021. Long term goals  Time frame: 4/2/2021  1. Pt will show improvement in the [facility] Lymphedema Assessment Scale by decreasing the score to 14 and thus allow improvement in patient's quality of life.   2.  Patient will be independent with don/doff of compression system and use in order to prevent reaccumulation of fluid at discharge. Educated patient in compression garment donning and doffing and care. Patient redemonstrated techniques in the clinic today and will work with her stockings over the next couple of days. Will reassess on Monday. 3.  Pt will be independent in self-MLD and show stable limb volumes showing decongestion and pt. ready for transition to independent restorative phase of lymphedema therapy. Continued education in self manual lymph drainage for lower extremities and added upper extremities per patient concerns about arm swelling. Patient redemonstrated with verbal cues and handout was provided. With last reassessment left leg limb volume has decreased by 1395.45ml and right leg has decreased by 1586.16ml  since evaluation 1/6/2021. Patient has received her daytime compression garments and her night compression garments. Night compression garments require modifications as the thighs are too loose. SUBJECTIVE REPORT:  I am so excited to try the new stockings. My legs are really hurting today and it may be the weather that is bothering them. It will be nice to be out of the bandaging so I can use my inserts again to help with the foot pain. Pain: Pain is increased today in patient's right knee and bilateral feet. .Pain Scale 1: Numeric (0 - 10)  Pain Intensity 1: 6  Pain Location 1: Foot;Knee  Patient Stated Pain Goal: 0    Gait:  Independent gait without any assistive device for community distances  Transfers: Sit to stand with Independent using no assistive devices. Fall risk: low    ADLs: Independent and Modified independent with bathing and dressing depending on the activity. Treatment Response:  Patient reviewed packet received at evaluation. Patient brought in all required supplies for treatment. Patient has been applying multi-layer bandaging at home and arrived with bandaging in place today with excellent bandaging technique noted.   [facility] Lymphedema Assessment Scale:  deferred  TREATMENT AND OBJECTIVE DATA SUMMARY:     Therapeutic Exercise/Procedure 0 minutes   Treatment time: N/A  Walking program Patient encouraged to participate in a walking program up to 25 minutes per day as tolerated. Sabiha ball exercise program: Patient has been educated in the [employer] routine . She is working on  locating a ball for home use. Provided patient with information and guidance for obtaining a [employer] for home use and discussed options. Provided patient with copies of both the upper and lower extremity [employer] home programs today. Stick exercise program: N/A    Free exercises/ROM: Patient was previously educated in remedial home exercise program and deep abdominal breathing on a previous visit. She is encouraged to perform her exercises daily. Discussed how to incorporate exercises into her day without having to set aside time for the activities i.e exercises during tv programs you normally watch and deep breathing when stopped at stop lights. Home program: Patient to perform daily to BID:  Skin care, Deep abdominal breathing, Exercise routine, Walking program, Rest in supine, Compression bandage, Compression garments, Vasopneumatic device, Self manual lymph drainage (MLD), Bring supplies to each therapy visit and Purchase necessary supplies   Rationale: Exercise will increase the lymph angiomotoricity and tissue pressure of the skin and thus decrease swelling. Modalities 0 minutes   Treatment time: N/A  Vasopneumatic pump: Vasopneumatic pump is in place for home use. Patient is using pump per recommendations at home. Recommended patient use her pump when she removes her bandaging or compression garments. Educated in self manual lymph drainage during a previous session and discussed performance of techniques prior to or at the start of using her compression pump. She is following this routine on a daily basis.      Manual Lymphatic Drainage (MLD) 90 minutes   Treatment time: 1827-1414  Patient/family education provided in self MLD. Reviewed self manual lymph drainage with demonstration and patient redemonstration. She has a home program handout and has been educated on the benefits of performing trunk manual lymph drainage techniques and deep abdominal breathing prior to or at the onset of using her vasopneumatic compression pump. Patient reports she is performing the techniques at home. Patient has been educated in upper extremity manual lymph drainage as well. Provided patient with handout of techniques previously. She had no questions about these activities today. Area to decongest: UE: bilateral LE: bilateral   Sequence used and effectiveness: Deferred today for compression garment fitting and education. Performed manual lymph drainage with patient in supine. Performed techniques from trunk and abdomen to knees bilaterally with increased time spent on the thighs. Skin/wound care/debridement:  Skin is 100% intact today and patient is performing skin care at home independent. Applied multi-layer compression bandaging to:   Patient arrived with lower legs in multi-layer bandaging and bandaging was applied this am and is done extremely well. Removed bandaging and bathed legs with soap and water. Fitted for stockings upon delivery. Dick Raymond Upper/Lower extremity compression:   Upper/Lower extremity compression: Fitted for the following compression products today. LE: bilateral bilateral lower extremity: Thigh high    Style:   Flat-knit and Foam based    Brand:  [employer]    Type:  Custom: thigh length night garments with 2 compression jackets fo reach leg, color request purple    Vendor:   [employer]    Education:  Educated patient in compression garment donning and doffing. Garment lifespan. Return and reordering process (by bringing prior garments into the clinic).   Fitted patient for products today and they are too loose from mid thigh to proximal thigh. Will request garment to be modified. Therapist applied clips and marked the garment in order to take in the amount of material needed to achieve good fit and garments will be returned to [facility] for modification. Patient demonstrated independence with garment donning and doffing in the clinic today. Reviewed laundering and garment care. Patient left the clinic with compression stockings in place. Patient was measured for  the new [employer] line of stockings for daytime wear and patient was fitting for products today with overall good fit and patient reporting good garment comfort. She may require some modifications to the toe region of the right foot but she will work with the garments and we will reassess on Monday. Educated patient in donning and doffing, laundering and care, remake policy, skin care with garments and reordering process. Patient redemonstrated in clinic donning and doffing with minimal cues. Patient aslo received the Sig Varis Comprecapri to be worn with the knee highs. Patient does not like the Sig varis products and so therapist discussed andd emonstrated [employer] micro massaging compression garments for patient as they are often recommended for lipedema syndrome patients. Patient likes the material but sizing is a bit of an issue. We may still opt to try the largest size available due to it being very accommodating. Therapist will discuss further with [facility] to determine return options if it doesn't fit to see if ordering one is an option. Kinesiotaping: N/A   Girth/Volume measurement:  Deferred today for compression garment fitting and assessment. Height:     Weight:      BMI:     (36 or greater: adversely affecting lymphedema)    ASSESSMENT:   Patient returned to the clinic with multi layer bandaging in place and applied well. Fitted patient for all products today.   Night garments require alterations to improve fit in the thigh. Lower leg stockings fit well today and patient is reporting they are comfortable. Will reassess this new style of stocking on Monday for effectiveness and swelling control prior to reordering. Patient is progressing toward discharge to home restorative phase of treatment. Patient remains motivated and continues to do well in above home programs. Patient is eager to learn and improve on condition. Patient is making gains towards goals with all short term goals now met. PLAN OF CARE:   Continue plan of care as established on evaluation.       Frequency:  1-2 visits per week   Next session will address: Assess  circumferences  Garment fitting  Education in bandaging and self management techniques   Other:  [facility]     TOTAL TREATMENT 90 mins      Fredi January, TALIB, HONEY

## 2021-03-22 ENCOUNTER — HOSPITAL ENCOUNTER (OUTPATIENT)
Dept: PHYSICAL THERAPY | Age: 56
Discharge: HOME | End: 2021-03-22
Payer: COMMERCIAL

## 2021-03-22 PROCEDURE — 97140 MANUAL THERAPY 1/> REGIONS: CPT

## 2021-03-22 NOTE — PROGRESS NOTES
[facility]  Lymphedema Clinic  [address], [address], [place]  22.    LYMPHEDEMA THERAPY  VISIT: 11    []                  Daily note               [x]      90 re-certification of updated plan of care           NAME: James Bobby  DATE: 3/22/2021    GOALS  Short term goals  Time frame: 3/2/2021  1. Patient will demonstrate knowledge of signs/symptoms of infections/cellulitis and be independent in skin care to prevent cellulitis. Reviewed skin care with low pH lotion. Patient is knowledgeable in skin care and performing at home with Remedy lotion. Goal met 2/16/2021.  2.  Patient will demonstrate independence in lymphedema home program of therapeutic exercises to improve circulation and decongest limb to improve ADLs. Patient was educated in deep abdominal breathing, remedial exercises, and Sabiha Ball exercises for lower extremities during previous  treatment session. Patient was independent with Jodine Gallop exercises today. Goal met 3/1/2021.  3.  Patient will tolerate multi-layer bandages (MLB) and show measureable decrease in limb volume to allow ordering of home compression system (daytime, nighttime garments and pump as needed). Continued multi-layer compression bandaging with modifications with patient now bandaging her left thigh at night. Patient is skilled at bandaging. She continues to have some issues with foot pain with multi-layer bandaging in place, but it has improved with modified bandaging technique. Measured for daytime compression garments last session and custom night garments today. Goal met 2/25/2021. Long term goals  Time frame: 4/2/2021  1. Pt will show improvement in the [facility] Lymphedema Assessment Scale by decreasing the score to 14 and thus allow improvement in patient's quality of life.   2.  Patient will be independent with don/doff of compression system and use in order to prevent reaccumulation of fluid at discharge. Educated patient in compression garment donning and doffing and care. Patient redemonstrated techniques in the clinic today. Patient is reporting decreased stocking suspension and the garments are sliding down. Will request a remake in the elvarex soft to allow for addition of a stronger silicone band. 3.  Pt will be independent in self-MLD and show stable limb volumes showing decongestion and pt. ready for transition to independent restorative phase of lymphedema therapy. Continued education in self manual lymph drainage for lower extremities and added upper extremities per patient concerns about arm swelling. Patient redemonstrated with verbal cues and handout was provided. With last reassessment left leg limb volume has decreased by 1395.45ml and right leg has decreased by 1586.16ml  since evaluation 1/6/2021. Patient has received her daytime compression garments and her night compression garments. Night compression garments require modifications as the thighs are too loose. Day stockings are not staying up on her leg and a stocking remake is being requested. SUBJECTIVE REPORT:  I really like the material of this stocking but it keeps sliding down on my leg and I have to keep pulling it up. Can we get a different type of silicone on it? My skin is so sensitive and I do not want to have to use glue all the time to keep the stocking up on my leg. Pain: Pain is constant in right knee and patient reports an average pain level of 4-6/10 numeric scale. .Pain Scale 1: Numeric (0 - 10)  Pain Intensity 1: 4    Gait:  Independent gait without any assistive device for community distances  Transfers: Sit to stand with Independent using no assistive devices. Fall risk: low    ADLs: Independent and Modified independent with bathing and dressing depending on the activity. Treatment Response:  Patient reviewed packet received at evaluation.  Patient brought in all required supplies for treatment. Patient has been applying multi-layer bandaging at home and arrived with bandaging in place today with excellent bandaging technique noted. [facility] Lymphedema Assessment Scale:  deferred  TREATMENT AND OBJECTIVE DATA SUMMARY:     Therapeutic Exercise/Procedure 0 minutes   Treatment time: N/A  Walking program Patient encouraged to participate in a walking program up to 25 minutes per day as tolerated. Sabiha ball exercise program: Patient has been educated in the [employer] routine . She is working on  locating a ball for home use. Provided patient with information and guidance for obtaining a [employer] for home use and discussed options. Provided patient with copies of both the upper and lower extremity [employer] home programs today. Stick exercise program: N/A    Free exercises/ROM: Patient was previously educated in remedial home exercise program and deep abdominal breathing on a previous visit. She is encouraged to perform her exercises daily. Discussed how to incorporate exercises into her day without having to set aside time for the activities i.e exercises during tv programs you normally watch and deep breathing when stopped at stop lights. Home program: Patient to perform daily to BID:  Skin care, Deep abdominal breathing, Exercise routine, Walking program, Rest in supine, Compression bandage, Compression garments, Vasopneumatic device, Self manual lymph drainage (MLD), Bring supplies to each therapy visit and Purchase necessary supplies   Rationale: Exercise will increase the lymph angiomotoricity and tissue pressure of the skin and thus decrease swelling. Modalities 0 minutes   Treatment time: N/A  Vasopneumatic pump: Vasopneumatic pump is in place for home use. Patient is using pump per recommendations at home. Recommended patient use her pump when she removes her bandaging or compression garments.    Educated in self manual lymph drainage during a previous session and discussed performance of techniques prior to or at the start of using her compression pump. She is following this routine on a daily basis. Discussed the availability of basic compression pumps for the arms as well and discussed with patient that she can discuss this with her  to see about availability as this product was not ordered for patient through this clinic. Manual Lymphatic Drainage (MLD) 85 minutes   Treatment time: 0805-0930  Patient/family education provided in self MLD. Reviewed self manual lymph drainage with demonstration and patient redemonstration. She has a home program handout and has been educated on the benefits of performing trunk manual lymph drainage techniques and deep abdominal breathing prior to or at the onset of using her vasopneumatic compression pump. Patient reports she is performing the techniques at home. Patient has been educated in upper extremity manual lymph drainage as well. Provided patient with handout of techniques previously. Patient requests to review arm and leg manual lymph drainage techniques and these techniques were reviewed with patient in the clinic today. Reviewed hand positioning and direction of manual lymph drainage techniques. Therapist demonstrated techniques and patient redemonstrated techniques with cues. Area to decongest: UE: bilateral LE: bilateral   Sequence used and effectiveness:  Reviewed self manual lymph drainage techniques with patient today. See above. Skin/wound care/debridement:  Skin is 100% intact today and patient is performing skin care at home independent. Applied multi-layer compression bandaging to:   Patient arrived with lower legs in [address] non custom knee highs. Therapist donned the stockings for her today s/p garment remeasuring. Patient plans to go home after running a few errands and use her compression pump and then rebandage.  This will allow her to wear her sneakers with heel inserts for foot pain while she is running her errands. Pavel Chaparro Upper/Lower extremity compression:   Upper/Lower extremity compression: Fitted for and Measured for the following compression products today. LE: bilateral bilateral lower extremity: Thigh high    Style:   Flat-knit and Foam based    Brand:  Elvarex Soft    Type:  Custom:  knee high, color request purple    Vendor:   [employer] [facility]    Education:  Educated patient in compression garment donning and doffing. Garment lifespan. Return and reordering process (by bringing prior garments into the clinic). Patient reports she wore the Elvarex confidence knee highs but they kept sliding down and she had to keep pulling them up. Patient reports she loves the material and this was a concern as this stocking is only available with the lighter silicone band. Discussed options at length with patient and opted to request a remake in the Elvarex soft material where we can request the on top silicone band. Remeasured patient for custom knee highs for bilateral lower extremities as her circumferences are also decreased today. It is unclear if changing the circumferences in the [employer] confidence will improve the suspension enough for  effective daytime wear. A remake will be requested with [employer].    Discussed micro massaging garments for the thighs and updated patient on the 2 options [employer] which patient falls outside of the sizing on and the company does not allow for returns and the Bioflect garments which we have not used in these clinic for many years. Discussed patient's option of pursuing shapewear and measured her hips, waist and thigh and provided her with information on sizing to allow her to pursue over the counter shape wear as well. Patient appears to be option to try the Bioflect garments as well as she does not care for the [address] [place] [address].      [employer] did not have any other product suggestions when options were discussed with them earlier in the week. Kinesiotaping: N/A   Girth/Volume measurement:  Deferred today for compression garment assessment and remeasuring today. Waist: 121.3cm  Hip 150.3cm        Height:     Weight:      BMI:     (36 or greater: adversely affecting lymphedema)    ASSESSMENT:   Patient returned to the clinic for daytime stocking assessment. Patient reports her stockings are sliding down on her legs with this new style of stocking. They do not allow for use to use a different silicone band to improve suspension and patient has a lot of skin sensitivities so she does not want to rely on body glue for long term use. Will request a remake in the Elvarex soft. Patient's circumferences were decreased today as well so took new measurements to improve fit as patient has also started working with weight management. Patient remains motivated and continues to do well in above home programs. Patient is eager to learn and improve on condition. Patient is making gains towards goals with all short term goals now met. PLAN OF CARE:   Continue plan of care as established on evaluation. Extending treatment plan of care to finalize compression products orders, ensure product fit, order a second set of products and educate patient in all aspects of product use. Frequency:  1 visits every 1-3 weeks   Next session will address: Assess  circumferences  Garment fitting  Education in home management program   Other:  Levine, Susan. \[facility] Outlook.\""     TOTAL TREATMENT 85 mins      TALIB Aguilar, KARISHMA-KAIA    TREATMENT PLAN EFFECTIVE DATES:   3/22/2021 TO 6/17/2021  I have read the above plan of care for Abigail Kong. I certify the above prescribed services are required by this patient and are medically necessary.   The above plan of care has been developed in conjunction with the lymphedema/physical therapist.   Physician Signature: ____________________________________________________ Kitchen Ruff, PA  Date: _________________________________________________________________

## 2021-04-14 ENCOUNTER — APPOINTMENT (OUTPATIENT)
Dept: PHYSICAL THERAPY | Age: 56
End: 2021-04-14

## 2021-05-03 ENCOUNTER — HOSPITAL ENCOUNTER (OUTPATIENT)
Dept: PHYSICAL THERAPY | Age: 56
Discharge: HOME | End: 2021-05-03
Payer: COMMERCIAL

## 2021-05-03 PROCEDURE — 97140 MANUAL THERAPY 1/> REGIONS: CPT

## 2021-05-03 NOTE — PROGRESS NOTES
LYMPHEDEMA PT DAILY TREATMENT NOTE/ 30 day progress note - [facility] 2-15    Patient Name: Navi Rojas  Date:5/3/2021  : 1965  [x]  Patient  Verified  Payor: BLUE CROSS / Plan: [facility] PPO / Product Type: PPO /    In time:0800  Out time:0915  Total Treatment Time (min): 75  Total Timed Codes (min): 75  1:1 Treatment Time (MC only): 75  Visit #:  12    Treatment Area: Lymphedema, not elsewhere classified [I89.0]    SUBJECTIVE  Pain Level (0-10 scale): 4/10 numeric scale  Any medication changes, allergies to medications, adverse drug reactions, diagnosis change, or new procedure performed?: [x] No    [] Yes (see summary sheet for update)  Subjective functional status/changes:   [] No changes reported  I had to get some cortisone shots in my heels so I was glad to be able to get out of the wrapping. The night garments came back and they fit well with the alterations. I got the replacement knee highs but they are sliding down. I like the pressure in the new knee highs. OBJECTIVE       75 min Manual Therapy    Addressed patient questions about lymphapress use. Reviewed home management program with patient. Manual Lymphatic Drainage (MLD):  Area to decongest: LE: bilateral   Sequence used and effectiveness: Deferred for compression garment fitting and measuring   Skin/wound care/debridement: Reviewed skin care principles:   Skin is 100% intact today        Upper/Lower Extremity Compression: Fitted for and Measured for the following compression products:    LE: bilateral bilateral lower extremity: Knee high Helene    Style: Flat-knit Jobst confidence    Brand: [employer]  Bioflect micro massaging helene size 3XL    Type: Custom: Jobst confidence    Vendor: [employer]    Education: Educated patient in compression garment donning and doffing. Garment lifespan. Return and reordering process (by bringing prior garments into the clinic). Glue use with garment for suspension.   Patient is requesting to keep the confidence garments since she will require glue to keep both stockings in place. She feels the confidence knee highs are more comfortable than the remake elvarex soft. She is requesting to order her second set in the Confidence and is requesting to order the [address]. She is knowledgeable that the [address] is non-returnable. Night garments were received and fit well. Patient/family demonstrated donning and doffing with independence     Rationale: decrease pain, increase tissue extensibility and decrease edema  to improve patient's ability to manage her swelling over time. With   [] TE   [] TA   [x] MT   [] SC   [] other: Patient Education: [] Review HEP    [] MLD Patient Education     [] Progressed/Changed HEP based on:   [] positioning   [] Kinesiotape   [] Skin care   [] wound care   [x] other: Home program reviewed with patient  []    Patient / caregiver re-demonstrated bandaging. [x] Yes  [] No  Compression bandaging/garment precautions reviewed: [x] Yes  [] No    .  Pain Level (0-10 scale) post treatment: 4/10 numeric scale    ASSESSMENT/Changes in Function: Patient returns for a garment fitting and night garments have been modified and patient reports good fit. Remade Elvarex soft knee highs were received and fit well but patient is having garment slipping with newest pair and she will need to use body glue to suspend the garment. She is opting to continue to use the confidence garments and return the soft knee highs and she is reordering the confidence with new measurements for her second set. Will also order the Bioflect micro massaging rebeca for patient to try. Patient is nearing goal attainment and will be discharged to home restorative phase of treatment upon delivery.    Patient will continue to benefit from skilled PT services to  analyze compression product fit and use, instruct in home lymphedema management program and measure for compression products to attain remaining goals. [x]  See Plan of Care  []  See progress note/recertification  []  See Discharge Summary         Progress towards goals / Updated goals:   Short term goals  Time frame: 3/2/2021  1.  Patient will demonstrate knowledge of signs/symptoms of infections/cellulitis and be independent in skin care to prevent cellulitis. Reviewed skin care with low pH lotion. Patient is knowledgeable in skin care and performing at home with Remedy lotion. Goal met 2/16/2021. 2.  Patient will demonstrate independence in lymphedema home program of therapeutic exercises to improve circulation and decongest limb to improve ADLs. Patient was educated in deep abdominal breathing, remedial exercises, and Sabiha Ball exercises for lower extremities during previous  treatment session. Patient was independent with Arcelia Medardo exercises today. Goal met 3/1/2021. 3.  Patient will tolerate multi-layer bandages (MLB) and show measureable decrease in limb volume to allow ordering of home compression system (daytime, nighttime garments and pump as needed). Continued multi-layer compression bandaging with modifications with patient now bandaging her left thigh at night. Patient is skilled at bandaging. She continues to have some issues with foot pain with multi-layer bandaging in place, but it has improved with modified bandaging technique. Measured for daytime compression garments last session and custom night garments today. Goal met 2/25/2021.     Long term goals  Time frame: 4/2/2021 Extend goal to 6/30/2021  1.  Pt will show improvement in the [facility] Lymphedema Assessment Scale by decreasing the score to 14 and thus allow improvement in patient's quality of life. 2.  Patient will be independent with don/doff of compression system and use in order to prevent reaccumulation of fluid at discharge. Educated patient in compression garment donning and doffing and care.   Patient redemonstrated techniques in the clinic today. Patient is reporting decreased stocking suspension and the garments are sliding down. She has been educated in the use of body glue for suspension. Some measurement changes were made and her reorders were placed today. She is independent with all donning and doffing. Goal met 5/3/2021. 3.  Pt will be independent in self-MLD and show stable limb volumes showing decongestion and pt. ready for transition to independent restorative phase of lymphedema therapy. Patient is independent with self manual lymph drainage for lower extremities and added upper extremities per patient concerns about arm swelling. Patient redemonstrated with verbal cues and handout was provided. With last reassessment left leg limb volume has decreased by 1395.45ml and right leg has decreased by 1586.16ml  since evaluation 1/6/2021. Patient has received her daytime compression garments and her night compression garments. A reorder with changes has been placed.     PLAN  []  Upgrade activities as tolerated     [x]  Continue plan of care  []  Update interventions per flow sheet       []  Discharge due to:_  []  Other:_     TALIB Tenorio, HONEY 5/3/2021

## 2021-05-05 ENCOUNTER — OFFICE VISIT (OUTPATIENT)
Dept: FAMILY MEDICINE CLINIC | Age: 56
End: 2021-05-05
Payer: COMMERCIAL

## 2021-05-05 VITALS
DIASTOLIC BLOOD PRESSURE: 72 MMHG | OXYGEN SATURATION: 98 % | WEIGHT: 293 LBS | BODY MASS INDEX: 44.41 KG/M2 | HEART RATE: 69 BPM | SYSTOLIC BLOOD PRESSURE: 109 MMHG | HEIGHT: 68 IN | TEMPERATURE: 97.8 F | RESPIRATION RATE: 18 BRPM

## 2021-05-05 DIAGNOSIS — F41.8 DEPRESSION WITH ANXIETY: ICD-10-CM

## 2021-05-05 DIAGNOSIS — E78.2 MIXED HYPERLIPIDEMIA: ICD-10-CM

## 2021-05-05 DIAGNOSIS — Z00.00 ROUTINE GENERAL MEDICAL EXAMINATION AT A HEALTH CARE FACILITY: Primary | ICD-10-CM

## 2021-05-05 DIAGNOSIS — I48.0 PAF (PAROXYSMAL ATRIAL FIBRILLATION) (HCC): ICD-10-CM

## 2021-05-05 DIAGNOSIS — Z12.31 ENCOUNTER FOR SCREENING MAMMOGRAM FOR BREAST CANCER: ICD-10-CM

## 2021-05-05 PROBLEM — M17.0 PRIMARY OSTEOARTHRITIS OF BOTH KNEES: Status: ACTIVE | Noted: 2021-05-05

## 2021-05-05 LAB
ALBUMIN SERPL-MCNC: 3.9 G/DL (ref 3.5–5)
ALBUMIN/GLOB SERPL: 1.2 {RATIO} (ref 1.1–2.2)
ALP SERPL-CCNC: 84 U/L (ref 45–117)
ALT SERPL-CCNC: 29 U/L (ref 12–78)
ANION GAP SERPL CALC-SCNC: 4 MMOL/L (ref 5–15)
AST SERPL-CCNC: 16 U/L (ref 15–37)
BILIRUB SERPL-MCNC: 0.5 MG/DL (ref 0.2–1)
BUN SERPL-MCNC: 18 MG/DL (ref 6–20)
BUN/CREAT SERPL: 23 (ref 12–20)
CALCIUM SERPL-MCNC: 8.7 MG/DL (ref 8.5–10.1)
CHLORIDE SERPL-SCNC: 106 MMOL/L (ref 97–108)
CHOLEST SERPL-MCNC: 164 MG/DL
CO2 SERPL-SCNC: 31 MMOL/L (ref 21–32)
CREAT SERPL-MCNC: 0.8 MG/DL (ref 0.55–1.02)
ERYTHROCYTE [DISTWIDTH] IN BLOOD BY AUTOMATED COUNT: 14 % (ref 11.5–14.5)
EST. AVERAGE GLUCOSE BLD GHB EST-MCNC: 117 MG/DL
GLOBULIN SER CALC-MCNC: 3.2 G/DL (ref 2–4)
GLUCOSE SERPL-MCNC: 94 MG/DL (ref 65–100)
HBA1C MFR BLD: 5.7 % (ref 4–5.6)
HCT VFR BLD AUTO: 42 % (ref 35–47)
HDLC SERPL-MCNC: 63 MG/DL
HDLC SERPL: 2.6 {RATIO} (ref 0–5)
HGB BLD-MCNC: 12.7 G/DL (ref 11.5–16)
LDLC SERPL CALC-MCNC: 86.4 MG/DL (ref 0–100)
LIPID PROFILE,FLP: NORMAL
MCH RBC QN AUTO: 29.7 PG (ref 26–34)
MCHC RBC AUTO-ENTMCNC: 30.2 G/DL (ref 30–36.5)
MCV RBC AUTO: 98.1 FL (ref 80–99)
NRBC # BLD: 0 K/UL (ref 0–0.01)
NRBC BLD-RTO: 0 PER 100 WBC
PLATELET # BLD AUTO: 222 K/UL (ref 150–400)
PMV BLD AUTO: 10.3 FL (ref 8.9–12.9)
POTASSIUM SERPL-SCNC: 4.3 MMOL/L (ref 3.5–5.1)
PROT SERPL-MCNC: 7.1 G/DL (ref 6.4–8.2)
RBC # BLD AUTO: 4.28 M/UL (ref 3.8–5.2)
SODIUM SERPL-SCNC: 141 MMOL/L (ref 136–145)
TRIGL SERPL-MCNC: 73 MG/DL (ref ?–150)
TSH SERPL DL<=0.05 MIU/L-ACNC: 1.17 UIU/ML (ref 0.36–3.74)
VLDLC SERPL CALC-MCNC: 14.6 MG/DL
WBC # BLD AUTO: 5.4 K/UL (ref 3.6–11)

## 2021-05-05 PROCEDURE — 99396 PREV VISIT EST AGE 40-64: CPT | Performed by: FAMILY MEDICINE

## 2021-05-05 RX ORDER — ESCITALOPRAM OXALATE 10 MG/1
10 TABLET ORAL EVERY EVENING
COMMUNITY
Start: 2021-05-05 | End: 2021-11-22 | Stop reason: SDUPTHER

## 2021-05-05 NOTE — ASSESSMENT & PLAN NOTE
Stable. Does counseling prn as well. Key Psychotherapeutic Meds             buPROPion XL (WELLBUTRIN XL) 150 mg tablet (Taking) TAKE 1 TABLET BY MOUTH ONCE DAILY IN THE MORNING    escitalopram oxalate (LEXAPRO) 20 mg tablet (Taking) Takes 1/2 Tab by mouth every evening.

## 2021-05-05 NOTE — PROGRESS NOTES
Chief Complaint   Patient presents with    Complete Physical     Fasting       HISTORY OF PRESENT ILLNESS   HPI  Annual CPE fasting and follow up medication check  Doing well on current meds  She reduced the Lexapro from 20 mg to 1/2 tablet due to increased sweating on the higher dose  Otherwise feeling well  Getting injections in her knees per Ortho due to severe arthritis. Needs knee replacement but has to lose more weight first  Diet: Low Carb, Low Fat Diet since 4-2021 since joining [facility]  Exercise: takes short walks every day x 5-10 minutes due to her knee arthritis  Caffeine: 1 large mug of half caff coffee a day  Weight: Joined [facility] 4-2021, goes weekly but not taking any of the supplements or injections   REVIEW OF SYMPTOMS   Review of Systems   Constitutional: Negative. HENT: Negative. Eyes: Negative. Respiratory: Negative. Cardiovascular: Negative. Gastrointestinal: Negative. Genitourinary: Negative. Musculoskeletal: Negative for myalgias. Neurological: Negative. Endo/Heme/Allergies: Negative.     Psychiatric/Behavioral:        Doing ok on Wellbutrin and Lexapro at this time           PROBLEM LIST/MEDICAL HISTORY     Problem List  Date Reviewed: 5/5/2021          Codes Class Noted    Primary osteoarthritis of both knees ICD-10-CM: M17.0  ICD-9-CM: 715.16  5/5/2021    Overview Signed 5/5/2021  9:42 AM by Ihsan Hays MD     [facility], , [id number]             LILLIAM on CPAP ICD-10-CM: Q55.61, Z99.89  ICD-9-CM: 327.23, V46.8  11/3/2020    Overview Signed 11/3/2020  8:26 AM by Ihsan Hays MD                  BMI 40.0-44.9, adult [facility]) ICD-10-CM: S55.45  ICD-9-CM: V85.41  7/10/2020        Venous insufficiency of both lower extremities ICD-10-CM: I87.2  ICD-9-CM: 459.81  10/17/2019    Overview Signed 10/17/2019  8:47 AM by Ihsan Hays MD     Vascular Dr. Dre Joe             PAF (paroxysmal atrial fibrillation) ([facility] ICD-10-CM: I48.0  ICD-9-CM: 427.31  2/22/2019    Overview Signed 2/22/2019  8:28 AM by MD Dunia Quintero Dr,              Depression with anxiety ICD-10-CM: F41.8  ICD-9-CM: 300.4  2/22/2019    Overview Signed 11/3/2020  8:46 AM by Rehana Gomez MD     Counseling [id number]             Colonoscopy refused ICD-10-CM: Z53.20  ICD-9-CM: V64.2  2/22/2019        Obesity, morbid ([address] ICD-10-CM: E66.01  ICD-9-CM: 278.01  2/22/2018        Plantar fasciitis, bilateral ICD-10-CM: M72.2  ICD-9-CM: 728.71  2/22/2018    Overview Signed 2/22/2018 11:30 AM by Rehana Gomez MD     Podiatrist Dr Sarah Leger 1-2018, orthotics             Panic attacks ICD-10-CM: F41.0  ICD-9-CM: 300.01  4/17/2017        Anxiety ICD-10-CM: F41.9  ICD-9-CM: 300.00  9/19/2016    Overview Signed 9/19/2016  2:26 PM by Rehana Gomez MD     [id number]             Postmenopause, LMP ~ 45 yo, 2013, No HRT ICD-10-CM: Z78.0  ICD-9-CM: V49.81  9/19/2016        Palpitations ICD-10-CM: R00.2  ICD-9-CM: 785.1  1/9/2015    Overview Signed 4/2/2015 12:40 PM by Oanh Unger MD     1/16 event monitor, pac's otherwise unremarkable             SOB (shortness of breath) ICD-10-CM: R06.02  ICD-9-CM: 786.05  1/9/2015        History of mitral valve disorder ICD-10-CM: Z86.79  ICD-9-CM: V12.59  12/19/2013    Overview Signed 12/19/2013  3:57 PM by Rehana Gomez MD     In her late 19's; told it was \"benign\", no follow up             Vitamin D deficiency ICD-10-CM: E55.9  ICD-9-CM: 268.9  8/20/2013    Overview Signed 8/20/2013  2:18 PM by Rehana Gomez MD     2011             Perimenopause ICD-10-CM: N95.1  ICD-9-CM: 627.2  8/20/2013    Overview Signed 8/20/2013  2:21 PM by Rehana Gomez MD     Since 2012             Scoliosis ICD-10-CM: M41.9  ICD-9-CM: 737.30  6/28/2013    Overview Signed 6/28/2013 10:18 AM by Rehana Gomez MD     Since childhood; no surgery or brace; PT only             Mixed hyperlipidemia ICD-10-CM: E78.2  ICD-9-CM: 272.2  2/7/2011        Esophageal reflux ICD-10-CM: K21.9  ICD-9-CM: 530.81  2/7/2011                  PAST SURGICAL HISTORY     Past Surgical History:   Procedure Laterality Date    HX CHOLECYSTECTOMY  2019    HX COLONOSCOPY      REFUSES    HX CYST REMOVAL  15 yo    HX OTHER SURGICAL  2019    Vein Ablation left leg         MEDICATIONS     Current Outpatient Medications   Medication Sig    ascorbic acid (VITAMIN C PO) Take 1,000 mg by mouth daily.  buPROPion XL (WELLBUTRIN XL) 150 mg tablet TAKE 1 TABLET BY MOUTH ONCE DAILY IN THE MORNING    escitalopram oxalate (LEXAPRO) 20 mg tablet Take 1 Tab by mouth every evening. (Patient taking differently: Take 10 mg by mouth every evening. Taking 1/2 of 20 mg tab once daily)    apixaban (ELIQUIS) 5 mg tablet Take 5 mg by mouth two (2) times a day.  atorvastatin (LIPITOR) 20 mg tablet Take 20 mg by mouth daily.  CARTIA  mg ER capsule Take 120 mg by mouth daily.  COQ10, UBIQUINOL, PO Take  by mouth daily. No current facility-administered medications for this visit. ALLERGIES     Allergies   Allergen Reactions    Bee Sting [Sting, Bee] Swelling    Crestor [Rosuvastatin] Other (comments)     Worsening ringing in ears, muscle aches, joint pain, tried again 2018, myalgias    Lipitor [Atorvastatin] Myalgia    Meloxicam Other (comments)     Inc blood pressure    Other Medication Other (comments)     Wellbutrin 300 mg---> Increased anxiety/nervousness/jittery;  Lexapro >10 mg--->Increased sweating    Prozac [Fluoxetine] Other (comments)     Dizziness and lack of coordination          SOCIAL HISTORY     Social History     Tobacco Use    Smoking status: Never Smoker    Smokeless tobacco: Never Used   Substance Use Topics    Alcohol use: No     Social History     Social History Narrative    , no children     for the state [employer]    Diet: Low Carb, Low Fat Diet since  since joining [facility]    Exercise: takes short walks every day x 5-10 minutes due to her knee arthritis    Caffeine: 1 large mug of half caff coffee a day    [facility]: [facility] , goes weekly but not taking any of the supplements or injections         Social History     Substance and Sexual Activity   Sexual Activity Yes    Partners: Male    Comment: Perimenopause and Rhythm       IMMUNIZATIONS     Immunization History   Administered Date(s) Administered    Covid-19, MODERNA, Mrna, Lnp-s, Pf, 100mcg/0.5mL 2021, 2021    Tdap 2013         FAMILY HISTORY     Family History   Problem Relation Age of Onset    Breast Cancer Mother         diagnosed in her 52's, survivor    Other Mother 64        heart arrhythmia    Heart Failure Mother    Alisa Renee COPD Mother          age 80, -    Cancer Father 61        lung    Diabetes Father         type 2    Hypertension Father     No Known Problems Sister     No Known Problems Brother     No Known Problems Brother     Breast Cancer Maternal Grandmother          in her late 55's-59's    Heart Attack Maternal Grandfather          in his mid [de-identified] from an MI    Other Paternal Grandmother          natural causes in old age   Alisa Renee Other Paternal Grandfather          in car accident    Other Sister 50        heart arrhythmia    Breast Cancer Maternal Aunt          VITALS     Visit Vitals  /72 (BP 1 Location: Left upper arm, BP Patient Position: Sitting, BP Cuff Size: Adult)   Pulse 69   Temp 97.8 °F (36.6 °C) (Temporal)   Resp 18   Ht 5' 8\" (1.727 m)   Wt 293 lb 3.2 oz (133 kg)   LMP 2013   SpO2 98%   BMI 44.58 kg/m²          PHYSICAL EXAMINATION   Physical Exam  Vitals signs reviewed. Constitutional:       General: She is not in acute distress.   HENT:      Right Ear: Tympanic membrane normal.      Left Ear: Tympanic membrane normal.   Eyes:      Conjunctiva/sclera: Conjunctivae normal.   Neck:      Musculoskeletal: Neck supple. Vascular: No carotid bruit. Cardiovascular:      Rate and Rhythm: Normal rate and regular rhythm. Heart sounds: Normal heart sounds. No murmur. Pulmonary:      Effort: Pulmonary effort is normal.      Breath sounds: Normal breath sounds. Abdominal:      Palpations: Abdomen is soft. There is no mass. Tenderness: There is no abdominal tenderness. Musculoskeletal:         General: No tenderness. Comments: BLE lymphedema at her baseline   Lymphadenopathy:      Cervical: No cervical adenopathy. Skin:     General: Skin is warm and dry. Neurological:      General: No focal deficit present. Mental Status: She is alert and oriented to person, place, and time. Mental status is at baseline.       Gait: Gait normal.   Psychiatric:         Mood and Affect: Mood and affect normal.                LABORATORY DATA/ANCILLARY/IMAGING     Results for orders placed or performed in visit on 02/11/20   CBC W/O DIFF   Result Value Ref Range    WBC 5.4 3.4 - 10.8 x10E3/uL    RBC 4.30 3.77 - 5.28 x10E6/uL    HGB 12.9 11.1 - 15.9 g/dL    HCT 39.8 34.0 - 46.6 %    MCV 93 79 - 97 fL    MCH 30.0 26.6 - 33.0 pg    MCHC 32.4 31.5 - 35.7 g/dL    RDW 12.5 11.7 - 15.4 %    PLATELET 392 346 - 802 x10E3/uL   LIPID PANEL   Result Value Ref Range    Cholesterol, total 151 100 - 199 mg/dL    Triglyceride 95 0 - 149 mg/dL    HDL Cholesterol 52 >39 mg/dL    VLDL, calculated 19 5 - 40 mg/dL    LDL, calculated 80 0 - 99 mg/dL   METABOLIC PANEL, COMPREHENSIVE   Result Value Ref Range    Glucose 92 65 - 99 mg/dL    BUN 15 6 - 24 mg/dL    Creatinine 0.90 0.57 - 1.00 mg/dL    GFR est non-AA 71 >59 mL/min/1.73    GFR est AA 82 >59 mL/min/1.73    BUN/Creatinine ratio 17 9 - 23    Sodium 141 134 - 144 mmol/L    Potassium 4.8 3.5 - 5.2 mmol/L    Chloride 101 96 - 106 mmol/L    CO2 26 20 - 29 mmol/L    Calcium 9.4 8.7 - 10.2 mg/dL    Protein, total 6.8 6.0 - 8.5 g/dL    Albumin 4.2 3.8 - 4.9 g/dL    GLOBULIN, TOTAL 2.6 1.5 - 4.5 g/dL    A-G Ratio 1.6 1.2 - 2.2    Bilirubin, total 0.4 0.0 - 1.2 mg/dL    Alk. phosphatase 80 39 - 117 IU/L    AST (SGOT) 19 0 - 40 IU/L    ALT (SGPT) 18 0 - 32 IU/L   TSH 3RD GENERATION   Result Value Ref Range    TSH 1.410 0.450 - 4.500 uIU/mL   CVD REPORT   Result Value Ref Range    INTERPRETATION Note             ASSESSMENT & PLAN   Diagnoses and all orders for this visit:    1. Routine general medical examination at a health care facility  -     CBC W/O DIFF; Future  -     METABOLIC PANEL, COMPREHENSIVE; Future  -     LIPID PANEL; Future  -     TSH 3RD GENERATION; Future  -     HEMOGLOBIN A1C WITH EAG; Future    2. Mixed hyperlipidemia  -     LIPID PANEL; Future  Key CAD CHF Meds             atorvastatin (LIPITOR) 20 mg tablet (Taking) Take 20 mg by mouth daily. 3. BMI 40.0-44.9, adult ([address] Reviewed diet, nutrition, exercise, weight management, BMI/goals. -     LIPID PANEL; Future  -     HEMOGLOBIN A1C WITH EAG; Future    4. PAF (paroxysmal atrial fibrillation) (HCC)  Assessment & Plan:   Stable. Anticoagulated. Rate controlled. Followed by Cardio Dr Shilpa Christianson at [facility], . Followed there q 6 months for now  Key CAD CHF Meds             apixaban (ELIQUIS) 5 mg tablet (Taking) Take 5 mg by mouth two (2) times a day. atorvastatin (LIPITOR) 20 mg tablet (Taking) Take 20 mg by mouth daily. CARTIA  mg ER capsule (Taking) Take 120 mg by mouth daily. 5. Encounter for screening mammogram for breast cancer  -     [facility] MAMMO BI SCREENING INCL CAD; Future    6. Depression with anxiety  Assessment & Plan:  Stable. Does counseling prn as well. Key Psychotherapeutic Meds             buPROPion XL (WELLBUTRIN XL) 150 mg tablet (Taking) TAKE 1 TABLET BY MOUTH ONCE DAILY IN THE MORNING    escitalopram oxalate (LEXAPRO) 20 mg tablet (Taking) Takes 1/2 Tab by mouth every evening.               Fasting labs today  Cardiovascular risk and specific BP/lipid/LDL goals reviewed  Reviewed medications and side effects   Currently taking Lexapro 20 mg 1/2 tablet but next refill would like to change to a full 10 mg tab instead  Reviewed diet, nutrition, exercise, weight management, BMI/goals. Age/risk based screening recommendations, health maintenance & prevention counseling. Cancer screening USPTFS guidelines reviewed w/ pt today. Discussed benefits/positive/negative outcomes of screening based on age/risk stratification. Informed consent for/against screening based on pt's personal hx/risk factors. Updated in history above and health maintenance. Further follow up & other recommendations pending review of labs.  If all remains good and stable, follow up in 1 yr, sooner prn

## 2021-05-05 NOTE — PROGRESS NOTES
Chief Complaint   Patient presents with    Complete Physical     1. Have you been to the ER, urgent care clinic since your last visit? Hospitalized since your last visit? No    2. Have you seen or consulted any other health care providers outside of the [address] [place] [address] since your last visit? Include any pap smears or colon screening.  No

## 2021-05-05 NOTE — ASSESSMENT & PLAN NOTE
Stable. Anticoagulated. Rate controlled. Followed by Cardio Dr Suzy Cole at [facility], . Key CAD CHF Meds             apixaban (ELIQUIS) 5 mg tablet (Taking) Take 5 mg by mouth two (2) times a day. atorvastatin (LIPITOR) 20 mg tablet (Taking) Take 20 mg by mouth daily. CARTIA  mg ER capsule (Taking) Take 120 mg by mouth daily.

## 2021-05-15 ENCOUNTER — PATIENT MESSAGE (OUTPATIENT)
Dept: FAMILY MEDICINE CLINIC | Age: 56
End: 2021-05-15

## 2021-05-24 ENCOUNTER — HOSPITAL ENCOUNTER (OUTPATIENT)
Dept: MAMMOGRAPHY | Age: 56
Discharge: HOME | End: 2021-05-24
Attending: FAMILY MEDICINE
Payer: COMMERCIAL

## 2021-05-24 DIAGNOSIS — Z12.31 ENCOUNTER FOR SCREENING MAMMOGRAM FOR BREAST CANCER: ICD-10-CM

## 2021-05-24 PROCEDURE — 77063 BREAST TOMOSYNTHESIS BI: CPT

## 2021-06-17 ENCOUNTER — HOSPITAL ENCOUNTER (OUTPATIENT)
Dept: PHYSICAL THERAPY | Age: 56
Discharge: HOME | End: 2021-06-17
Payer: COMMERCIAL

## 2021-06-17 PROCEDURE — 97140 MANUAL THERAPY 1/> REGIONS: CPT

## 2021-06-17 NOTE — PROGRESS NOTES
LYMPHEDEMA PT DAILY TREATMENT NOTE/ 30 day progress note - [facility] 2-15   Discharge Summary     Patient Name: Matthew Cleaning  Date:2021  : 1965  [x]  Patient  Verified  Payor: BLUE CROSS / Plan: [facility] PPO / Product Type: PPO /    In time:805  Out time:0930  Total Treatment Time (min): 85  Total Timed Codes (min): 85  1:1 Treatment Time ( only): 85  Visit #:  13    Treatment Area: Lymphedema, not elsewhere classified [I89.0]    SUBJECTIVE  Pain Level (0-10 scale): 4/10 numeric scale  Any medication changes, allergies to medications, adverse drug reactions, diagnosis change, or new procedure performed?: [x] No    [] Yes (see summary sheet for update)  Subjective functional status/changes:   [] No changes reported  So I received the remade stockings in the lower pressure and they are fitting well and are more comfortable. I do have to use the glue to hold them up, but that is okay. I had some more cortisone in my feet. It turns out I had a deep corn on the outside of my right foot and it wasn't a bunion causing me pain. Lymphedema Life Impact Scale Scores: 37.5 out of 68 with 55% impairment ( Perrin's Lymphedema scale is no longer being used in this clinic)  OBJECTIVE       85 min Manual Therapy             Manual Lymphatic Drainage (MLD):  Area to decongest: LE: bilateral   Sequence used and effectiveness: Deferred for compression garment fitting, education and reassessment. Skin/wound care/debridement: Reviewed skin care principles:   Skin is 100% intact today     Reviewed skin care protocols and educated patient in aquaphor and it's role as a healing ointment. Discussed Podiatrist recommendations about using an antibiotic ointment on her feet daily to prevent friction and the stocking requirements which recommend no lotions or ointments that could damage the product. Discussed anti-chafing silicone based products which may help patient and still protect her garments. Discussed trying whatever product she chooses on a small area to monitor her compression garments for adverse reactions. Multi layer compression bandaging Educated patient in overbandaging of her Solaris Orysia Rushing if needed and verbally instructed her in how to apply short stretch bandaging over top of her Deloria Matter for an easier bandaging technique if she feels she needs to return to bandaging. Advised her that she would not use the Tribute jacket with overbandaging and she would not need the tricofix, fleece or foam layers to bandage over the [employer] garment. Recommended she use 3 short stretch bandages for leg compression bandaging over the Tribute night on each leg. Reviewed when she may choose to perform bandaging rather than wearing her day and night compression garments as part of her management routine. Upper/Lower Extremity Compression: Fitted for the following compression products:    LE: bilateral bilateral lower extremity: Knee high Other: and rebeca    Style: Flat-knit Jobst confidence, Bioflect micro massaging compression rebeca    Brand: Jobst  Bioflect micro massaging rebeca size 3XL    Type: Custom: Jobst confidence   Bioflect micro massaging rebeca size 3XL  Vendor: [facility]    Education: Educated patient in compression garment donning and doffing. Daily wear schedule. Daily laundering. Garment lifespan. Return and reordering process (by bringing prior garments into the clinic). She continues to feel the confidence knee highs are more comfortable  And she is tolerating the class 1 products much better than the class 2 garments which were causing foot pain and had to be remade as class 1. She brought in the Elvarex soft and the class 2 Jobst confidence garments to be returned to [address] [place] [address] fit is assessed to be good today with no issues noted. Patient also reports the Tribute night garments are working well although they are too hot to sleep in at times.   Discussed that she can wear them during the daytime in place of her compression stockings when she gets home from work or when she is home for the day. Addressed patient questions about using the Tributes under her vasopneumatic pump and she was advised that it is safe for her to do so. Discussed that she should not use her compression pump over top of her compression stockings though due to safety concerns. Discussed that some tools and combinations of tools will be more effective for her than for others. Suggested she keep a journal of her daily activities and her pain and swelling status to help her determine which management tools and combinations are more beneficial for her. Patient felt this would be helpful to her. Fitted patient for the [facility] [id number] with excellent fit noted. Patient will work with this product when at home to break it in and to see if she will be able to work in it. She does report that she likes it better than the Sig varis compre rebeca. Patient is independent with her home management routine. Patient demonstrated donning and doffing with independence     Rationale: decrease pain, increase tissue extensibility, decrease edema  and and obtain appropriate compression products to improve patient's ability to manage her swelling over time. With   [] TE   [] TA   [x] MT   [] SC   [] other: Patient Education: [] Review HEP    [] MLD Patient Education     [] Progressed/Changed HEP based on:   [] positioning   [] Kinesiotape   [x] Skin care -reviewed today  [] wound care   [x] other: Home program reviewed with patient  [x]  Compression garment care, use and wearing schedule  Compression bandaging over top of Tribute night garment.  [x] Yes  [] No  Compression bandaging/garment precautions reviewed: [x] Yes  [] No    .  Pain Level (0-10 scale) post treatment: 0/10 numeric scale and patient reports her pain at it's highest level is 5-6/10 numeric scale    Volumetric Reassessemnt  Reviewed results of volumetric measurements taken on evaluation. -right lower 16,175.26 ml today compared to 18,608.18 ml on 1/6/2021.     -left lower  16,010.40 ml today compared to 18,145.272 ml on 1/6/2021. Percent difference today is 1.02% right larger than left as compared to 2.49% right larger than left on evaluation. ASSESSMENT/Changes in Function: Patient returns for continued garment fittings for remade Jobst confidence knee highs in a class 1 versus previously ordered compression class 2 garments. She was also fitted for Bioflect linwood massaging compression rebeca. All products fit well and patient is independent with product use and wearing schedule. Her limb volumes are significantly improved from evaluation and she is independent with all aspects of her home management routine. Patient has requested a new vasopneumatic device with her physician's office to address her swelling from the waist down which is much more appropriate to address the entire affected area with her condition. Goals are met and patient will be discharged to the home restorative phase of treatment at this time. Patient will be reassessed in November for swelling status and compression product needs. Progress towards goals / Updated goals:   Short term goals  Time frame: 3/2/2021  1.  Patient will demonstrate knowledge of signs/symptoms of infections/cellulitis and be independent in skin care to prevent cellulitis. Reviewed skin care with low pH lotion. Patient is knowledgeable in skin care and performing at home with Remedy lotion. Goal met 2/16/2021. 2.  Patient will demonstrate independence in lymphedema home program of therapeutic exercises to improve circulation and decongest limb to improve ADLs. Patient was educated in deep abdominal breathing, remedial exercises, and Sabiha Ball exercises for lower extremities during previous  treatment session.  Patient was independent with Brandon Tariq exercises today. Goal met 3/1/2021. 3.  Patient will tolerate multi-layer bandages (MLB) and show measureable decrease in limb volume to allow ordering of home compression system (daytime, nighttime garments and pump as needed). Continued multi-layer compression bandaging with modifications with patient now bandaging her left thigh at night. Patient is skilled at bandaging. She continues to have some issues with foot pain with multi-layer bandaging in place, but it has improved with modified bandaging technique. Measured for daytime compression garments last session and custom night garments today. Goal met 2/25/2021.     Long term goals  Time frame: 4/2/2021 Extend goal to 6/30/2021  1.  Pt will show improvement in the [facility] Lymphedema Assessment Scale by decreasing the score to 14 and thus allow improvement in patient's quality of life. Goal is discontinued as this scale is no longer being utilized in this clinic. Lymphedema Life Impact scale score was 55% upon assessment today. 2.  Patient will be independent with don/doff of compression system and use in order to prevent reaccumulation of fluid at discharge. Educated patient in compression garment donning and doffing and care. Patient redemonstrated techniques in the clinic today. Patient is reporting decreased stocking suspension and the garments are sliding down. She has been educated in the use of body glue for suspension. Some measurement changes were made and her reorders were placed today. She is independent with all donning and doffing. Goal met 5/3/2021. 3.  Pt will be independent in self-MLD and show stable limb volumes showing decongestion and pt. ready for transition to independent restorative phase of lymphedema therapy. Patient is independent with self manual lymph drainage for lower extremities and added upper extremities per patient concerns about arm swelling.   Patient redemonstrated with verbal cues and handout was provided. With assessment left leg limb volume has decreased by 2134. 87ml and right leg has decreased by 2,432. 92ml  since evaluation 1/6/2021. Patient has received her daytime compression garments and her night compression garments and she is independent with home management routine. Goal met 6/17/2021.       PLAN  []  Upgrade activities as tolerated     []  Continue plan of care  []  Update interventions per flow sheet       [x]  Discharge due to: goal attainment and patient independent with home management  []  Other:_       Geraline Bound, MSPT, CLT-KAIA 6/17/2021

## 2021-08-01 DIAGNOSIS — F41.8 DEPRESSION WITH ANXIETY: ICD-10-CM

## 2021-08-01 RX ORDER — BUPROPION HYDROCHLORIDE 150 MG/1
TABLET ORAL
Qty: 90 TABLET | Refills: 2 | Status: SHIPPED | OUTPATIENT
Start: 2021-08-01 | End: 2022-04-28

## 2021-11-18 ENCOUNTER — HOSPITAL ENCOUNTER (OUTPATIENT)
Dept: PHYSICAL THERAPY | Age: 56
Discharge: HOME | End: 2021-11-18
Payer: COMMERCIAL

## 2021-11-18 VITALS — HEIGHT: 68 IN | WEIGHT: 293 LBS | BODY MASS INDEX: 44.41 KG/M2

## 2021-11-18 PROCEDURE — 97140 MANUAL THERAPY 1/> REGIONS: CPT

## 2021-11-18 PROCEDURE — 97161 PT EVAL LOW COMPLEX 20 MIN: CPT

## 2021-11-18 PROCEDURE — 97110 THERAPEUTIC EXERCISES: CPT

## 2021-11-18 NOTE — PROGRESS NOTES
[facility]  Lymphedema Clinic  [address] [facility], [address] [facility]    INITIAL EVALUATION    NAME: Bessy Dumont AGE: 60 y.o. GENDER: female  DATE: 11/18/2021  REFERRING PHYSICIAN:   Loraine Ahumada, PA  HISTORY AND BACKGROUND:   Primary Diagnosis:  · Lymphedema, not elsewhere classified (I89.0)  Other Treatment Diagnoses:   Other abnormalities of gait (R26.89)   Edema, unspecified (R60.9)   Venous insufficiency (I87.2)   PAF (paroxysmal atrial fibrillation) ([facility]) (I48.0)  · Obesity, morbid ([facility] [address] (E66.01)  Date of Onset: 11/9/2020 Patient returns for 6 month re-evaluation. Present Symptoms and Functional Limitations: Patient returns to the clinic for reassessment and she has been wearing her custom compression knee highs daily and using her vasopneumatic device daily. She reports she has not been wearing her micro massaging rebeca due to they are hot and they make her pants slide down secondary to the material is smooth. She is not performing skin care due to she feels her skin is in good condition. Patient also reports she has not been doing her lymphedema remedial exercise program and she requests to review these activities along with a review of manual lymph drainage techniques. She is also requesting to increase the compression level of her lower extremity compression knee highs to class 2 on her next order. Patient reports ongoing knee pain and bilateral plantar fasciitis pain. She reports that she noticed increased swelling after she received her Covid booster recently. Patient also reports that she has switched positions at her job and that her new position requires more standing throughout the day which she feels has led to increased leg swelling. Patient reports leg swelling has been present for at least four years but she states that her legs have always \"big. \" She does report a history of lower leg weeping intermittently but has not experienced this since she had her vascular procedure. Patient had a  vascular ablation of the greater and lesser saphenous veins in the left leg. Franklin reports she had the procedure 4-6 weeks ago and her incision sites are healed. She reports no family history of lymphedema. Lymphedema Life Impact Scale:  Scores 55 out of 68 with 81% impairment. Past Medical History:   Past Medical History:   Diagnosis Date    Colonoscopy refused 2/22/2019    Depression with anxiety 2/22/2019    History of mitral valve disorder 12/19/2013    In her late 20's; told it was \"benign\", no follow up    LILLIAM on CPAP 11/3/2020        [facility] 2/22/2019    Alcides Hammond,     Panic attacks 4/17/2017    Perimenopause 8/20/2013    Since 2012    Plantar fasciitis, bilateral 2/22/2018    Podiatrist Dr Florina Winters 1-2018, orthotics    Postmenopause, LMP ~ 45 yo, 2013, No HRT 9/19/2016    Primary osteoarthritis of both knees 5/5/2021    [facility], , 4-2021    Scoliosis 6/28/2013    Venous insufficiency of both lower extremities 10/17/2019    Vascular Dr. Braxton Locke Vitamin D deficiency 8/20/2013 2011     Past Surgical History:   Procedure Laterality Date    HX CHOLECYSTECTOMY  2019    HX COLONOSCOPY      REFUSES    HX CYST REMOVAL  15 yo    HX OTHER SURGICAL  2019    Vein Ablation left leg     Current Medications:    Current Outpatient Medications   Medication Sig    buPROPion XL (WELLBUTRIN XL) 150 mg tablet TAKE 1 TABLET BY MOUTH ONCE DAILY IN THE MORNING    ascorbic acid (VITAMIN C PO) Take 1,000 mg by mouth daily.  escitalopram oxalate (LEXAPRO) 10 mg tablet Take 1 Tab by mouth every evening.  apixaban (ELIQUIS) 5 mg tablet Take 5 mg by mouth two (2) times a day.  atorvastatin (LIPITOR) 20 mg tablet Take 20 mg by mouth daily.  CARTIA  mg ER capsule Take 120 mg by mouth daily.     COQ10, UBIQUINOL, PO Take  by mouth daily. No current facility-administered medications for this encounter. Allergies: adhesive allergy  Allergies   Allergen Reactions    Bee Sting [Sting, Bee] Swelling    Crestor [Rosuvastatin] Other (comments)     Worsening ringing in ears, muscle aches, joint pain, tried again 2018, myalgias    Lipitor [Atorvastatin] Myalgia    Meloxicam Other (comments)     Inc blood pressure    Other Medication Other (comments)     Wellbutrin 300 mg---> Increased anxiety/nervousness/jittery; Lexapro >10 mg--->Increased sweating    Prozac [Fluoxetine] Other (comments)     Dizziness and lack of coordination        Prior Level of Function/Social/Work History/Personal factors and/or comorbidities impacting plan of care: Patient works full time as 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 She is  and resides with her  who is a [employer]. .  Has taken a new position at her job that requires her to be on her feet more than her previous position. They have 3 cats. Living Situation: Private residence   Trainable Caregiver?: Yes, but patient's  is a farmer with limited spare time  Mobility: Independent gait without any assistive device for community distances  Sleeping Arrangement:  in bed  Adaptive Equipment Owned:  crutches  Other: Patient is able to don compression stockings independent. Previous Therapy:   Patient received treatment at [facility] Lymphedema program 1/6/2021 to 6/17/2021  Compression/Lymphedema Equipment: Patient has a Lymphapress. Custom Jobst Confidence compression knee highs (2 pairs), custom [employer] Tribute thigh length night compression garments, Bioflect micromassaging capris, sig varis comprecapri (patient does care for this product)    SUBJECTIVE:   My swelling is worse down in my ankles. I would like to go ahead and order the class 2 compression knee highs. Since I am on my feet a lot more, I think that will help wit the swelling.   I still have a lot of hot flashes so it is hard to wear the rebeca compression and sometimes I pull off the night compression garments during the night. The rebeca tends make my pants slip down because they are slippery. I feel like the varicose veins on my inner right knee and thigh are getting worse. Is there anything I can do about that? Patients goals for therapy: reassess swelling and to be measured for new compression garments. OBJECTIVE DATA SUMMARY:   EXAMINATION/PRESENTATION/DECISION MAKING:   Pain:  Patient reports current pain level of 6/10 numeric scale with reports of plantar fasciitis pain bilateral feet and pain in bilateral knees. She is also tender to touch throughout her legs bilaterally. Pain Scale 1: Numeric (0 - 10)  Pain Intensity 1: 6  Pain Location 1: Foot; Knee  Patient Stated Pain Goal: 0    Self-Care and ADLs: Modified independent to independent. Patient reports requiring increased time for some activities. She can don and doff compression garments for herself. Skin and Tissue Assessment:  Dermal Status: Intact and Scars-multiple, small are present along medial left leg. Texture/Consistency: Boggy with some brawniness noted along distal posterior aspect of left lower leg superior to the ankle    Pigmentation/Color Change: Hyperpigmented and Hemosiderin    Anomalies: None noted currently    Circulatory: Pulses, Varicosities and Vascular studies ruled out DVT in past    Nails: Normal- toenails have nail polish in place today    Stemmers Sign: Negative    Height:  Height: 5' 8\" (172.7 cm)  Weight:  Weight: 142.2 kg (313 lb 9.6 oz) ( was 304lbs 1.6oz on 1/6/2021)  BMI:  BMI (calculated): 47.7  (36 or greater: adversely affecting lymphedema)  Wound/Ulcer:  N/A        Volumetric Measurements:   Right:  17,962.53mL ( increased by 7,750. 27ml since last reassessment 6/17/2021 but decreased by 645.65 ml from initial evaluation 1/6/2021)  Left:  17,468. 223mL  ( increased by 1,457. 83ml since last reassessment 6/17/2021 but decreased by 677.04 ml from initial evaluation 1/6/2021)    % Difference: right lower extremity 2.75% larger than left versus 1.02% larger on last assessment 6/17/2021 Dominance: right   (See scanned graph)  Patient body shape and swelling distribution is indicative of a lipedema with mild to no foot swelling and ankle cuffing. Limb volumes have increased significantly since patient was last seen in therapy in June. Range of Motion: [facility] for bilateral lower extremities but decreased at knees due to arthritis  Strength: Not formally assessed 2* patient is able to complete all functional activities in the clinic today. Sensation:  Intact     Mobility:    Bed/Chair Mobility: Independent  Transfers: Independent  Gait: Independent  Endurance: good    Safety:  Patient is alert and oriented: yes  Safety awareness: good  Fall risk?: low  Patient given written fall prevention handout: Yes     Based on the above components, the patient evaluation is determined to be of the following complexity level: LOW     Evaluation Time: 7693-7883 30 minutes    TREATMENT PROVIDED:   1. Treatment description:  Therapeutic Exercise and Procedure:   Patient requests to review remedial home exercise program today. She reports she is unsure of where her handouts are so she was provided with a new copy of the remedial exercises for lymphedema. Patient has not been able to locate a Bhanu Beers which is difficult to find currently so did not review with her today. Therapist demonstrated the exercises followed by patient redemonstration. She is to perform 5 repetitions of each seated exercise in the handout daily. Modified hand clap exercise to do seated marching. Patient is independent with deep abdominal breathing techniques. Patient was performing walking activities as well but she currently reports that when she tries to return to walking it exacerbates her knee and plantar fasciitis pain.   Discussed with patient the availability of PT for plantar fasciitis and some of the treatments available for this condition. Discussed that she can view taping techniques on the Internet for plantar fasciitis that can help with pain level. Patient does appear to have some adhesive allergies so cautioned patient to test tape on her skin in a small area for a few hours to monitor for adverse reaction before applying it to her feet if she tries the taping techniques. Treatment time:  7781-2667 Minutes: 10    2. Treatment description:  Manual Therapy:  Reviewed skin care protocol with low pH lotion and application following manual lymph drainage principles. Educated patient in signs and symptoms of cellulitis and to contact her physician immediately if she experiences any of those symptoms. Patient has not been performing skin care and educated at length the reasons for skin care to minimize the risk of infection and skin injury. Patient has Remedy lotion at home. Discussed the difference between lymphangitis and cellulitis. Patient has an area she is concerned about on her posterior left lower leg distally. The area is brawny but no redness or significant increased warmth is noted. Educated patient in hemosiderin staining with venous insufficiency per her questions. Reviewed the results of reassessment today with limb volume increases bilaterally since patient was last seen in therapy. Addressed patient questions about her reports of worsening spider veins in distal thigh and medial knee and discussed the importance of compression for vein health in venous insufficiency and for lipolymphedema. Patient has not been wearing any thigh compression. Recommended she return to thigh compression use and discussed starting by wearing the products when she is home if keeping her pants up is a problem and then progressing to work if able to do so.   Patient requests to order a larger size and she is on the border between a 3x and 4x Earl Monique so will order the 4x for her this time. Discussed vasopneumatic device use and patient is using her pump daily and she would like to use it twice a day. Discussed the difficulty in achieving twice a day treatents when you work full time outside the house. Discussed that she can perform some self manual lymph drainage in the am or pm in place of donning the pump to save some time. Reviewed self manual lymph drainage techniques with patient following home program handout today with patient demonstrating good recall. Modified techniques to perform stationary circles at all locations. Provided patient with another copy of the handout. Discussed compression knee highs and current garment fit. Patient wants to continue with the Jobst Confidence garments and will increase foot length slightly per patient request.  Garments are presenting as short today on patient but this may be due to the increased circumferences,  Proceeded to measure patient for custom Jobst confidence class 2 compression knee highs with closed toe to be ordered with [email or web address].  Patient is to return for fitting in the clinic to ensure proper feet. Will review her home program again when she returns for follow-up. Treatment time:  9483-4008 Minutes: 60    ASSESSMENT:   Jess Blankenship is a 60 y.o. female who presents with bilateral lower extremity swelling that presents with indicators of lipedema and phlebolymphedema. Patient's limb volumes are significantly increased from when she was last assessed in the clinic. She is utilizing some of her management tools and techniques but she needs to utilize all of them more consistently. The chronic pain in her knees and feet limits her activity level and exercise. Patient is motivated and eager to improve her condition. Her condition is complicated by sleep apnea, venous insufficiency, atrial fibrillation, depression and a history of anxiety and panic attacks. Patient continues to benefit from complete decongestive therapy (CDT) including: Manual lymphatic drainage (MLD) technique, Short-stretch textile bandages/compression system to decongest limb and Kinesiotaping as appropriate. This care is medically necessary due to the infection risk with lymphedema and to improve functional activities. CDT is necessary to resolve swelling to allow patient to return to wearing normal clothes/footwear and prevent worsening of symptoms, such as infections and/or hospitalizations. Patient will be independent with home program strategies to allow improved ADL ability and mobility and to allow patient to return to greatest functional independence. Rehabilitation potential is considered to be Good. Factors which may influence rehabilitation potential include patient is eager to learn and motivated to improve her condition. Patient will benefit from 3-6 physical therapy visits over 10-12 weeks to optimize improvement in these areas. PLAN OF CARE:   Recommendations and Planned Interventions: Manual lymph drainage/decongestive therapy, Compression garment fitting/provision, Lymphedema therapeutic exercise, Self-care training, Education in skin care and lymphedema precautions, Self-MLD education per home program and Self-bandaging education per home program    GOALS  Short term goals  Time frame: 12/30/2021  1. Patient will demonstrate independence in lymphedema home program of therapeutic exercises to improve circulation and decongest limb to improve ADLs. 2.  Patient will receive appropriately fitting lower extremity daytime compression products to promote optimal swelling management over time. 3.  Patient will be independent with don/doff of compression system and use in order to prevent reaccumulation of fluid at discharge. 4.  Patient will be independent in all aspects of her home management program to prevent worsening of swelling over time.        Patient has participated in goal setting and agrees to work toward plan of care. Patient was instructed to call if questions or concerns arise. Thank you for this referral.  TALIB Phillips, CLVEL-KAIA     Time Calculation: 100 mins          Total timed codes: 70  1:1 Treatment time: 70    TREATMENT PLAN EFFECTIVE DATES:   11/18/2021 TO 2/13/2021  I have read the above plan of care for Bessy Dumont. I certify the above prescribed services are required by this patient and are medically necessary.   The above plan of care has been developed in conjunction with the lymphedema/physical therapist.       Physician Signature: ____________________________________Date:______________                                   Loraine Ahumada, PA

## 2021-11-22 DIAGNOSIS — F41.8 DEPRESSION WITH ANXIETY: ICD-10-CM

## 2021-11-22 RX ORDER — ESCITALOPRAM OXALATE 10 MG/1
10 TABLET ORAL EVERY EVENING
Qty: 90 TABLET | Refills: 1 | Status: SHIPPED | OUTPATIENT
Start: 2021-11-22 | End: 2022-04-29 | Stop reason: SDUPTHER

## 2021-11-22 NOTE — TELEPHONE ENCOUNTER
Last Visit: 5/5/21 MD Dyaln Gonsales  Next Appointment: Not scheduled- due 5/2022  Previous Refill Encounter(s): Historical provider    Requested Prescriptions     Pending Prescriptions Disp Refills    escitalopram oxalate (LEXAPRO) 10 mg tablet 90 Tablet 1     Sig: Take 1 Tablet by mouth every evening.

## 2021-12-20 ENCOUNTER — HOSPITAL ENCOUNTER (OUTPATIENT)
Dept: PHYSICAL THERAPY | Age: 56
Discharge: HOME | End: 2021-12-20
Payer: COMMERCIAL

## 2021-12-20 VITALS — HEIGHT: 68 IN | WEIGHT: 293 LBS | BODY MASS INDEX: 44.41 KG/M2

## 2021-12-20 PROCEDURE — 97140 MANUAL THERAPY 1/> REGIONS: CPT

## 2021-12-20 PROCEDURE — 97110 THERAPEUTIC EXERCISES: CPT

## 2021-12-20 NOTE — PROGRESS NOTES
LYMPHEDEMA PT DAILY TREATMENT NOTE - [facility] 2-15  30 day progress note  Patient Name: Modesta Buckner  Date:2021  : 1965  [x]  Patient  Verified  Payor: BLUE CROSS / Plan: [facility] PPO / Product Type: PPO /    In time:0800 Out time:0930  Total Treatment Time (min): 90  Total Timed Codes (min): 90  1:1 Treatment Time ( only): 90  Visit #: 2   Treatment Area: Lymphedema, not elsewhere classified [I89.0]    SUBJECTIVE  Pain Level (0-10 scale): 6/10 numeric scale, knee pain  Any medication changes, allergies to medications, adverse drug reactions, diagnosis change, or new procedure performed?: [x] No    [] Yes (see summary sheet for update)  Subjective functional status/changes:   [] No changes reported  I love my new stockings. They fit great and right now they are staying up well as well. Whatever changes you made to the order are good. I am using my compression pump but it is very uncomfortable on my legs when I use it. I feel like all the pressure and squeezing is a lot on my legs. I am also concerned about my hips and waist/trunk because I am swelling there and we were not able to get my current lymphapress changed to address these areas. OBJECTIVE    10 min Therapeutic Exercise:  [] Verna Harrell Exercises [x] Remedial Lymphedema Exercise Program- reviewed home exercise routine and how to incorporate her deep abdominal breathing into her new PT exercises for her back. Discussed the crossover of a lot of her exercises and that she does not need to do a separate lymphedema routine. Patient reports she obtained a portable pedal system for lower extremity bike activities that can be done in sitting. Encouraged her to continue to use this equipment and discussed her concerns about increasing her cardiac activity but her leg tolerance is currently limited.   Recommended that she can perform upper body bike activities by placing the pedal system on a table and educated patient in the benefit of upper body exercise on cardiac activity secondary to her limited tolerance of lower extremity activities secondary to pain. [] Axillary Web Exercise Program      [] Shoulder ROM Exercises   Rationale: Activate muscle pump to improve lymphatic fluid movement and decrease swelling to improve the patients ability to perform ADL and IADL skills and prevent worsening of swelling over time. 80 min Manual Therapy    Kinesiotaping: N/A       Manual Lymphatic Drainage (MLD):  Area to decongest: LE: bilateral      Sequence used and effectiveness: Secondary sequence for lower extremities with trunk involvement. Discussed self manual lymph drainage techniques with patient and home vasopneumatic device use. Reviewed how to perform manual lymph drainage techniques to encourage increased trunk fluid movement while patient is using her leg lympha press. Reviewed direction of fluid movement and patient is knowledgeable in techniques. Addressed patient questions with regard to vasopneumatic pump use, frequency, MLD, diuretics, and home management routines. Skin/wound care/debridement: Reviewed skin care principles:   Patient is skilled in skin care and knowledgeable in signs and symptoms of cellulitis. Applied multi-layer compression bandaging to:   Patient has received multi-layer compression bandaging in the past.      Upper/Lower Extremity Compression: Fitted for the following compression products:    LE: bilateral bilateral lower extremity: Knee high    Style: Flat-knit    Brand: [employer]st    Type: Custom: [employer] Confidence with soft fit silicone band and closed toes    Vendor: [employer]    Education: Educated patient in compression garment donning and doffing. Glove use with donning. Daily wear schedule. Garment lifespan. Garments fit extremely well and she is requesting an exact reorder but with color changes.   Demonstrated color options and patient requests to have the second set ordered in [facility]. Patient is reporting some issues with her thumbs with stocking donning. Demonstrated how to use her donning gloves to rub stockings up to minimize grasping in order to reduce wear and tear on her thumbs. Reorder was sent to [facility].  Patient reports she received the capris with good fit and she is working up her wearing tolerance. She has 2 good sets of capris and does not require an additional set currently. Patient/family demonstrated donning and doffing with independence     Rationale: decrease pain, decrease edema  and obtain correctly fitting compression garments to improve the patients ability to manage her swelling over time. N/A   Vasopneumatic Device:   Patient is requesting to investigate alternative compression pump options versus her lymphapress due to the presence of truncal swelling. Current lymphapress is only providing treatment to the proximal thighs. Patient reports discomfort when using this basic device. Discussed the pressure level settings and pressures of the vascular system and lymphatic vessels. Provided patient with information on a Flexitouch and she request patient investigate this device as a possible option for her. Discussed demo process if it is an option to pursue this device and patient request to have an in clinic demo. Rationale: To improve lymphatic fluid movement and decrease swelling to improve the patients ability to perform ADL and IADL skills and prevent worsening of swelling over time. With   [x] TE   [] TA   [x] MT   [] SC   [x] other: Patient Education: [x] Review HEP    [x] MLD Patient Education    [] Progressed/Changed HEP based on:   [] positioning   [] Kinesiotape   [] Skin care   [] wound care   [x] other: addressed question about vasopneumatic device options and demo process  [x] compression garment donning and doffing with gloves, reordering process  Patient / caregiver re-demonstrated bandaging.  [] Yes  [] No  Compression bandaging/garment precautions reviewed: [x] Yes  [] No     Other Objective/Functional Measures:      Height:  Height: 5' 8\" (172.7 cm)  Weight:  Weight: 144.6 kg (318 lb 12.8 oz)   BMI:  BMI (calculated): 48.5  (36 or greater: adversely affecting lymphedema)    right lower 18,049.47 ml today compared to 17,962. 53ml on 11/18/2021 . left lower  17,297.57 ml today compared to 17,468. 223ml on 11/18/2021. Percent difference today is 4.17% as compared to right lower extremity 2.75% larger than left on evaluation. 12/20/2021          3/22/2021       2/22/2021  Waist: 139.3cm           121.3cm             130.2cm   Hip:     153.5cm           150.3cm           152.5cm    Pain Level (0-10 scale) post treatment: 6/10     ASSESSMENT/Changes in Function:   Patient has received her new custom compression knee highs with excellent fit and she is independent with product use. A reorder was sent to [facility] for patient. Patient is concerned about her truncal swelling and her current vasopneumatic device which does not address her trunk swelling. She is presenting with increased hip and waist circumferences today and she is reporting discomfort with current pump use on her legs. Her lower legs are well managed in her current compression products but right thigh increased in size today. Patient would benefit from an advanced vasopneumatic device which would address trunk swelling within the affected region. She is requesting that therapist pursue the possibility of obtaining an advanced device. If it is possible, she will be scheduled for an in clinic demonstration. Vendor will be contacted by therapist to pursue this option.      Patient will continue to benefit from skilled PT services to  demo an advanced vasopneumatic device, address swelling, analyze compression product fit and use, instruct in home lymphedema management program and measure for compression products to attain remaining goals. [x]  See Plan of Care  []  See progress note/recertification  []  See Discharge Summary         Progress towards goals / Updated goals:   Short term goals  Time frame: 12/30/2021  1. Patient will demonstrate independence in lymphedema home program of therapeutic exercises to improve circulation and decongest limb to improve ADLs. Patient is independent. Goal met 12/20/2021.  2.  Patient will receive appropriately fitting lower extremity daytime compression products to promote optimal swelling management over time. Goal met 12/20/2021.  3.  Patient will be independent with don/doff of compression system and use in order to prevent reaccumulation of fluid at discharge. Goal met 12/20/2021. 4.  Patient will be independent in all aspects of her home management program to prevent worsening of swelling over time. Goal met 12/2021. New goal to be met 2/10/2021  5. Patient will receive an in clinic advanced vasopneumatic device demonstration to address questions and consider for possible upgrade to an advanced device for optimal long term swelling management.     PLAN  []  Upgrade activities as tolerated     [x]  Continue plan of care  [x]  Update interventions        []  Discharge due to:_  [x]  Other:Investigate the possibility of obtaining an advanced vasopneumatic device with trunk component      TALIB Green, KARISHMA-KAIA 12/20/2021

## 2021-12-28 ENCOUNTER — TELEPHONE (OUTPATIENT)
Dept: FAMILY MEDICINE CLINIC | Age: 56
End: 2021-12-28

## 2021-12-28 NOTE — TELEPHONE ENCOUNTER
Martha ([facility]) [phone]     Would like to know if fax was received for order for compression pump sent on 12/22. Please call back and provide an update of the status of the fax.      Fax can be sent to [phone]

## 2022-01-03 ENCOUNTER — APPOINTMENT (OUTPATIENT)
Dept: PHYSICAL THERAPY | Age: 57
End: 2022-01-03
Payer: COMMERCIAL

## 2022-01-17 ENCOUNTER — APPOINTMENT (OUTPATIENT)
Dept: PHYSICAL THERAPY | Age: 57
End: 2022-01-17
Payer: COMMERCIAL

## 2022-01-20 ENCOUNTER — APPOINTMENT (OUTPATIENT)
Dept: PHYSICAL THERAPY | Age: 57
End: 2022-01-20
Payer: COMMERCIAL

## 2022-01-31 ENCOUNTER — HOSPITAL ENCOUNTER (OUTPATIENT)
Dept: PHYSICAL THERAPY | Age: 57
Discharge: HOME | End: 2022-01-31
Payer: COMMERCIAL

## 2022-01-31 VITALS — BODY MASS INDEX: 44.41 KG/M2 | WEIGHT: 293 LBS | HEIGHT: 68 IN

## 2022-01-31 PROCEDURE — 97140 MANUAL THERAPY 1/> REGIONS: CPT

## 2022-01-31 PROCEDURE — 97016 VASOPNEUMATIC DEVICE THERAPY: CPT

## 2022-01-31 NOTE — PROGRESS NOTES
LYMPHEDEMA PT DAILY TREATMENT NOTE - [facility] 2-15  30 day progress note/Discharge summary  Patient Name: Deanna Santos  Date:2022  : 1965  [x]  Patient  Verified  Payor: BLUE CROSS / Plan: [id number] PPO / Product Type: PPO /    In time:0750 Out time: 0925  Total Treatment Time (min): 95  Total Timed Codes (min): 95  1:1 Treatment Time ( only): 20  Visit #: 3  Treatment Area: Lymphedema, not elsewhere classified [I89.0]    SUBJECTIVE  Pain Level (0-10 scale): 3/10 numeric scale, knee pain and foot pain  Any medication changes, allergies to medications, adverse drug reactions, diagnosis change, or new procedure performed?: [x] No    [] Yes (see summary sheet for update)  Subjective functional status/changes:   [] No changes reported  I broke my left toe about a week ago- it is a clean break. I have gotten cortisone shots in my knees and right foot so my pain is better than it as been. My lymphapress definitely is painful when I use it and I am uncomfortable with the sustained pressure that it maintains. I am looking forward to trying the Flexitouch. OBJECTIVE       20 min Manual Therapy    Kinesiotaping: N/A       Manual Lymphatic Drainage (MLD):  Area to decongest: LE: bilateral      Sequence used and effectiveness: Secondary sequence for lower extremities with trunk involvement. Reviewed the basics of manual lymph drainage and correlated with Flexitouch use and sequencing. Reviewed trunk clearance before beginning treatment on the lower legs. Skin/wound care/debridement: Reviewed skin care principles:   Patient is skilled in skin care and knowledgeable in signs and symptoms of cellulitis.    Applied multi-layer compression bandaging to:   Patient has received multi-layer compression bandaging in the past.      Upper/Lower Extremity Compression: Patient is currently wearing  the following compression products:    LE: bilateral bilateral lower extremity: Knee high    Style: Flat-knit    Brand: [employer]    Type: Custom: [employer] Confidence with soft fit silicone band and closed toes    Vendor: [facility]    Education: Patient is independent with compression garment use She reports some slipping down of garments. Discussed possible options for her when it is time to reorder and discussed that she may need to switch to the traditional [employer] material or a different brand such as Juzo flat-knit if [employer] does not offer a different silicone band at the time of reorder. Rationale: decrease pain, decrease edema  and obtain correctly fitting compression garments to improve the patients ability to manage her swelling over time. [address]:   Completed vasopneumatic device demonstration in the clinic with patient and [employer]. Demo was done on the left leg. Pre and post circumferences were taken for demo. See measurements below. Educated patient in product use, donning and doffing, ordering process. Patient reports increased comfort with the Flexitouch versus her Lymphapress ans softening in calf texture post treatment. Patient would benefit from a Flexitouch with treatment in the lower extremities as well as the hip and waist region. Rationale: To improve lymphatic fluid movement and decrease swelling to improve the patients ability to perform ADL and IADL skills and prevent worsening of swelling over time. With   [] TE   [] TA   [x] MT   [] SC   [x] other: Patient Education: [x] Review HEP    [x] MLD Patient Education    [] Progressed/Changed HEP based on:   [] positioning   [] Kinesiotape   [] Skin care   [] wound care   [x] other: addressed question about vasopneumatic device, demo process, product ordering, product use, garment donning and doffing  [] compression garment donning and doffing with gloves, reordering process  Patient / caregiver re-demonstrated bandaging.  [] Yes  [] No  Compression bandaging/garment precautions reviewed: [x] Yes  [] No     Other Objective/Functional Measures:      Height:  Height: 5' 8\" (172.7 cm)  Weight:  Weight: 141.4 kg (311 lb 12.8 oz)   BMI:  BMI (calculated): 47.4  (36 or greater: adversely affecting lymphedema)    right lower 18,049.47 ml 12/20/21 compared to 17,962. 53ml on 11/18/2021 . left lower 17,232.25ml today versus 17,297.57 ml 12/20/21compared to 17,468. 223ml on 11/18/2021. Demo on left side   Pre Flexitouch left (cm) Post Flexitouch left (cm)   Ankle     30.0     29.3   Calf     55.6     54.5   Mid Knee     53.0     53.0   Thigh  68.3        68.3   waist     126.3     123.0   hip  153.0     153.3        Pain Level (0-10 scale) post treatment: 3/10     ASSESSMENT/Changes in Function:   Patient has received her new custom compression knee highs, but she is reporting some increased garment slipping and she has had to return to using body glue for suspension. Her left leg circumferences and her weight are decreased today which may be contributing to the decreased suspension. Will review other garment options with her when she returns in several months for remeasuring and when she is allowed to obtain new garments with her insurance. Completed vasopneumatic device demo in the clinic today with Flexitouch. 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 She reports no pain with product use and increased comfort s/p treatment with improved tissue textures in left posterior calf. Order is in progress. She will be discharged from therapy today and she will return to the clinic in 4-6 months to be measured for new compression garments and to assess swelling status. All goals have been met. Progress towards goals / Updated goals:   Short term goals  Time frame: 12/30/2021  1. Patient will demonstrate independence in lymphedema home program of therapeutic exercises to improve circulation and decongest limb to improve ADLs.  Patient is independent. Goal met 12/20/2021.  2.  Patient will receive appropriately fitting lower extremity daytime compression products to promote optimal swelling management over time. Goal met 12/20/2021.  3.  Patient will be independent with don/doff of compression system and use in order to prevent reaccumulation of fluid at discharge. Goal met 12/20/2021. 4.  Patient will be independent in all aspects of her home management program to prevent worsening of swelling over time. Goal met 12/2021. New goal to be met 2/10/2021  5. Patient will receive an in clinic advanced vasopneumatic device demonstration to address questions and consider for possible upgrade to an advanced device for optimal long term swelling management. Goal met 1/31/2022. PLAN  []  Upgrade activities as tolerated     []  Continue plan of care  []  Update interventions        [x]  Discharge due to:_goal attainment and patient has received all of her compression products. Flexitouch order is in progress.   [x]  Other:      Ulysses Faes, MSPT, HONEY 1/31/2022

## 2022-03-19 PROBLEM — M17.0 PRIMARY OSTEOARTHRITIS OF BOTH KNEES: Status: ACTIVE | Noted: 2021-05-05

## 2022-03-19 PROBLEM — E66.01 OBESITY, MORBID (HCC): Status: ACTIVE | Noted: 2018-02-22

## 2022-03-19 PROBLEM — M72.2 PLANTAR FASCIITIS, BILATERAL: Status: ACTIVE | Noted: 2018-02-22

## 2022-03-19 PROBLEM — Z53.20 COLONOSCOPY REFUSED: Status: ACTIVE | Noted: 2019-02-22

## 2022-03-19 PROBLEM — F41.8 DEPRESSION WITH ANXIETY: Status: ACTIVE | Noted: 2019-02-22

## 2022-03-19 PROBLEM — I48.0 PAF (PAROXYSMAL ATRIAL FIBRILLATION) (HCC): Status: ACTIVE | Noted: 2019-02-22

## 2022-03-20 PROBLEM — F41.0 PANIC ATTACKS: Status: ACTIVE | Noted: 2017-04-17

## 2022-03-20 PROBLEM — Z99.89 OSA ON CPAP: Status: ACTIVE | Noted: 2020-11-03

## 2022-03-20 PROBLEM — I87.2 VENOUS INSUFFICIENCY OF BOTH LOWER EXTREMITIES: Status: ACTIVE | Noted: 2019-10-17

## 2022-03-20 PROBLEM — G47.33 OSA ON CPAP: Status: ACTIVE | Noted: 2020-11-03

## 2022-04-27 DIAGNOSIS — F41.8 DEPRESSION WITH ANXIETY: ICD-10-CM

## 2022-04-28 RX ORDER — BUPROPION HYDROCHLORIDE 150 MG/1
TABLET ORAL
Qty: 90 TABLET | Refills: 0 | Status: SHIPPED | OUTPATIENT
Start: 2022-04-28 | End: 2022-08-07

## 2022-04-29 DIAGNOSIS — F41.8 DEPRESSION WITH ANXIETY: ICD-10-CM

## 2022-04-29 RX ORDER — ESCITALOPRAM OXALATE 10 MG/1
10 TABLET ORAL EVERY EVENING
Qty: 90 TABLET | Refills: 0 | Status: SHIPPED | OUTPATIENT
Start: 2022-04-29 | End: 2022-07-25

## 2022-04-29 NOTE — TELEPHONE ENCOUNTER
Patient call for refill to [facility] attached. Thanks, Ann    Last Visit: 5/5/21 MD Rice  Next Appointment: 6/1/22 MD BANEGAS  Previous Refill Encounter(s): 11/22/21 90 + 1    Requested Prescriptions     Pending Prescriptions Disp Refills    escitalopram oxalate (LEXAPRO) 10 mg tablet 90 Tablet 0     Sig: Take 1 Tablet by mouth every evening.

## 2022-06-01 ENCOUNTER — OFFICE VISIT (OUTPATIENT)
Dept: FAMILY MEDICINE CLINIC | Age: 57
End: 2022-06-01
Payer: COMMERCIAL

## 2022-06-01 VITALS
DIASTOLIC BLOOD PRESSURE: 72 MMHG | TEMPERATURE: 98.4 F | RESPIRATION RATE: 16 BRPM | HEIGHT: 67 IN | HEART RATE: 66 BPM | WEIGHT: 293 LBS | BODY MASS INDEX: 45.99 KG/M2 | OXYGEN SATURATION: 98 % | SYSTOLIC BLOOD PRESSURE: 128 MMHG

## 2022-06-01 DIAGNOSIS — Z12.31 SCREENING MAMMOGRAM FOR BREAST CANCER: ICD-10-CM

## 2022-06-01 DIAGNOSIS — E78.2 MIXED HYPERLIPIDEMIA: ICD-10-CM

## 2022-06-01 DIAGNOSIS — Z00.00 ROUTINE GENERAL MEDICAL EXAMINATION AT A HEALTH CARE FACILITY: Primary | ICD-10-CM

## 2022-06-01 DIAGNOSIS — E55.9 VITAMIN D DEFICIENCY: ICD-10-CM

## 2022-06-01 DIAGNOSIS — Z12.12 SCREENING FOR COLORECTAL CANCER: ICD-10-CM

## 2022-06-01 DIAGNOSIS — R73.03 PREDIABETES: ICD-10-CM

## 2022-06-01 DIAGNOSIS — I48.0 PAF (PAROXYSMAL ATRIAL FIBRILLATION) (HCC): ICD-10-CM

## 2022-06-01 DIAGNOSIS — Z12.11 SCREENING FOR COLORECTAL CANCER: ICD-10-CM

## 2022-06-01 PROBLEM — G57.93 NEUROPATHY OF BOTH FEET: Status: ACTIVE | Noted: 2022-06-01

## 2022-06-01 PROCEDURE — 99396 PREV VISIT EST AGE 40-64: CPT | Performed by: FAMILY MEDICINE

## 2022-06-01 RX ORDER — GABAPENTIN 100 MG/1
100 CAPSULE ORAL
COMMUNITY
End: 2022-09-27

## 2022-06-01 RX ORDER — AMIODARONE HYDROCHLORIDE 400 MG/1
400 TABLET ORAL DAILY
COMMUNITY
Start: 2022-04-28

## 2022-06-01 RX ORDER — BISMUTH SUBSALICYLATE 262 MG
1 TABLET,CHEWABLE ORAL DAILY
COMMUNITY

## 2022-06-01 NOTE — ASSESSMENT & PLAN NOTE
Diagnosed 2018, Managed and monitored by cardiologist, Dr. Elyssa Mcneil, [facility]. No acute findings meriting change in the plan. Patient scheduled for follow up there today. Maintained on Eliquis and Amiodarone.

## 2022-06-01 NOTE — PROGRESS NOTES
Chief Complaint   Patient presents with    Complete Physical     Fasting       HISTORY OF PRESENT ILLNESS   HPI  Annual CPE Fasting  Sees cardiologist Dr. Haskell Kayser for h/o PAF  One month ago he changed he stopped her Diltiazem and started her on Amiodarone due to breakthrough AFib  Doing well on current regimen so far  Diet: nothing special, not healthy eating habits; did low carb, low fat diet through [facility] 4/2021-7/2021  Exercise: none due to her knee arthritis  Caffeine: 1 large mug of half caff coffee a day  Weight: 290's-310's over the years; did [facility] 4/2021-7/2021, stopped due to cost   REVIEW OF SYMPTOMS   Review of Systems   Constitutional: Negative. HENT: Negative. Eyes: Negative. Respiratory: Negative. Cardiovascular: Negative. Gastrointestinal: Negative. Negative for abdominal pain, blood in stool, constipation, diarrhea, melena, nausea and vomiting. Genitourinary: Negative. Neurological: Negative. Endo/Heme/Allergies: Negative. Negative for environmental allergies. Psychiatric/Behavioral: Negative.          Stable on current regimen of Lexapro & Wellbutrin           PROBLEM LIST/MEDICAL HISTORY     Problem List  Date Reviewed: 6/1/2022          Codes Class Noted    Neuropathy of both feet ICD-10-CM: G57.93  ICD-9-CM: 356.9  6/1/2022    Overview Signed 6/1/2022 11:50 AM by Ag Russell MD     Podiatrist Dr. Martha Root, Gabapentin 5/1/2022             Prediabetes ICD-10-CM: R73.03  ICD-9-CM: 790.29  6/1/2022        Primary osteoarthritis of both knees ICD-10-CM: M17.0  ICD-9-CM: 715.16  5/5/2021    Overview Signed 5/5/2021  9:42 AM by Ag Russell MD     [facility], , [phone]             LILLIAM on CPAP ICD-10-CM: E47.75, Z99.89  ICD-9-CM: 327.23, V46.8  11/3/2020    Overview Signed 11/3/2020  8:26 AM by Ag Russell MD                  BMI 40.0-44.9, adult [facility]) ICD-10-CM: E11.44  ICD-9-CM: V85.41  7/10/2020        Venous insufficiency of both lower extremities ICD-10-CM: I87.2  ICD-9-CM: 459.81  10/17/2019    Overview Signed 10/17/2019  8:47 AM by Kolby Toro MD     Vascular Dr. Hannah Deleon             PAF (paroxysmal atrial fibrillation) [facility]) ICD-10-CM: I48.0  ICD-9-CM: 427.31  2/22/2019    Overview Signed 2/22/2019  8:28 AM by Kolby Toro MD     [address] Dr Hannah Blanco,              Depression with anxiety ICD-10-CM: F41.8  ICD-9-CM: 300.4  2/22/2019    Overview Signed 11/3/2020  8:46 AM by Kolby Toro MD     Counseling [id number]             Colonoscopy refused ICD-10-CM: Z53.20  ICD-9-CM: V64.2  2/22/2019        Obesity, morbid ([address] ICD-10-CM: E66.01  ICD-9-CM: 278.01  2/22/2018        Plantar fasciitis, bilateral ICD-10-CM: M72.2  ICD-9-CM: 728.71  2/22/2018    Overview Signed 2/22/2018 11:30 AM by Kolby Toro MD     Podiatrist Dr Liz Horton 1-2018, orthotics             Panic attacks ICD-10-CM: F41.0  ICD-9-CM: 300.01  4/17/2017        Anxiety ICD-10-CM: F41.9  ICD-9-CM: 300.00  9/19/2016    Overview Signed 9/19/2016  2:26 PM by Kolby Toro MD     [id number]             Postmenopause, LMP ~ 47 yo, 2013, No HRT ICD-10-CM: Z78.0  ICD-9-CM: V49.81  9/19/2016        Palpitations ICD-10-CM: R00.2  ICD-9-CM: 785.1  1/9/2015    Overview Signed 4/2/2015 12:40 PM by Glen James MD     1/16 event monitor, pac's otherwise unremarkable             SOB (shortness of breath) ICD-10-CM: R06.02  ICD-9-CM: 786.05  1/9/2015        History of mitral valve disorder ICD-10-CM: Z86.79  ICD-9-CM: V12.59  12/19/2013    Overview Signed 12/19/2013  3:57 PM by Kolby Toro MD     In her late 19's; told it was \"benign\", no follow up             Vitamin D deficiency ICD-10-CM: E55.9  ICD-9-CM: 268.9  8/20/2013    Overview Signed 8/20/2013  2:18 PM by Kolby Toro MD     2011             Perimenopause ICD-10-CM: N95.1  ICD-9-CM: 627.2  8/20/2013    Overview Signed 8/20/2013  2:21 PM by Gaurav Sosa MD     Since 2012             Scoliosis ICD-10-CM: M41.9  ICD-9-CM: 737.30  6/28/2013    Overview Signed 6/28/2013 10:18 AM by Gaurav Sosa MD     Since childhood; no surgery or brace; PT only             Mixed hyperlipidemia ICD-10-CM: E78.2  ICD-9-CM: 272.2  2/7/2011        Esophageal reflux ICD-10-CM: K21.9  ICD-9-CM: 530.81  2/7/2011                  PAST SURGICAL HISTORY     Past Surgical History:   Procedure Laterality Date    HX CHOLECYSTECTOMY  2019    HX COLONOSCOPY      REFUSES    HX CYST REMOVAL  13 yo    HX OTHER SURGICAL  2019    Vein Ablation left leg         MEDICATIONS     Current Outpatient Medications   Medication Sig    amiodarone (PACERONE) 400 mg tablet Take 400 mg by mouth daily.  multivitamin (ONE A DAY) tablet Take 1 Tablet by mouth daily. Centrum silver    gabapentin (NEURONTIN) 100 mg capsule Take 100 mg by mouth nightly. For neuropathy in feet since 5/1/2022 per Podiatrist Dr. Will Nayak escitalopram oxalate (LEXAPRO) 10 mg tablet Take 1 Tablet by mouth every evening.  buPROPion XL (WELLBUTRIN XL) 150 mg tablet TAKE 1 TABLET BY MOUTH ONCE DAILY IN THE MORNING    ascorbic acid (VITAMIN C PO) Take 1,000 mg by mouth daily.  apixaban (ELIQUIS) 5 mg tablet Take 5 mg by mouth two (2) times a day.  atorvastatin (LIPITOR) 20 mg tablet Take 20 mg by mouth daily.  COQ10, UBIQUINOL, PO Take  by mouth daily. No current facility-administered medications for this visit. ALLERGIES     Allergies   Allergen Reactions    Bee Sting [Sting, Bee] Swelling    Crestor [Rosuvastatin] Other (comments)     Worsening ringing in ears, muscle aches, joint pain, tried again 2018, myalgias    Lipitor [Atorvastatin] Myalgia    Meloxicam Other (comments)     Inc blood pressure    Other Medication Other (comments)     Wellbutrin 300 mg---> Increased anxiety/nervousness/jittery;  Lexapro >10 mg--->Increased sweating    Prozac [Fluoxetine] Other (comments)     Dizziness and lack of coordination          SOCIAL HISTORY     Social History     Tobacco Use    Smoking status: Never Smoker    Smokeless tobacco: Never Used   Substance Use Topics    Alcohol use: No     Social History     Social History Narrative        No children    Lives at home w/  in [facility]        Diet: nothing special, not healthy eating habits; did low carb, low fat diet through [facility] 2021-2021    Exercise: none due to her knee arthritis    Caffeine: 1 large mug of half caff coffee a day    Weight: 290's-310's over the years; did [facility] 2021-2021, stopped due to cost     Social History     Substance and Sexual Activity   Sexual Activity Yes    Partners: Male    Comment: Perimenopause and Rhythm       IMMUNIZATIONS     Immunization History   Administered Date(s) Administered    COVID-19, Berta Hutchinson, Primary or Immunocompromised Series, MRNA, PF, 100mcg/0.5mL 2021, 2021    COVID-19, Pfizer Purple top, DILUTE for use, 12+ yrs, 30mcg/0.3mL dose 10/28/2021    Tdap 2013    Zoster Recombinant 2021, 2021    Zoster Vaccine, Live 2021         FAMILY HISTORY     Family History   Problem Relation Age of Onset    Breast Cancer Mother         diagnosed in her 52's, survivor    Other Mother 64        heart arrhythmia    Heart Failure Mother     COPD Mother          age 80, -    Cancer Father 61        lung    Diabetes Father         type 2    Hypertension Father     No Known Problems Sister     No Known Problems Brother     No Known Problems Brother     Breast Cancer Maternal Grandmother          in her late 55's-59's    Heart Attack Maternal Grandfather          in his mid [de-identified] from an MI    Other Paternal Grandmother          natural causes in old age   [facility] Other Paternal Grandfather          in car accident    Other Sister 50        heart arrhythmia    Breast Cancer Maternal Aunt          VITALS     Visit Vitals  /72 (BP 1 Location: Left upper arm, BP Patient Position: Sitting, BP Cuff Size: Large adult)   Pulse 66   Temp 98.4 °F (36.9 °C) (Oral)   Resp 16   Ht 5' 7\" (1.702 m)   Wt 315 lb 12.8 oz (143.2 kg)   LMP 07/19/2013   SpO2 98%   BMI 49.46 kg/m²          PHYSICAL EXAMINATION   Physical Exam  Vitals reviewed. Constitutional:       General: She is not in acute distress. HENT:      Right Ear: Tympanic membrane normal.      Left Ear: Tympanic membrane normal.   Eyes:      Conjunctiva/sclera: Conjunctivae normal.   Neck:      Thyroid: No thyroid mass, thyromegaly or thyroid tenderness. Cardiovascular:      Rate and Rhythm: Normal rate and regular rhythm. Heart sounds: No murmur heard. Pulmonary:      Effort: Pulmonary effort is normal.      Breath sounds: Normal breath sounds. Abdominal:      Palpations: Abdomen is soft. Tenderness: There is no abdominal tenderness. Musculoskeletal:         General: No swelling or tenderness. Cervical back: Neck supple. Lymphadenopathy:      Cervical: No cervical adenopathy. Skin:     General: Skin is warm and dry. Neurological:      General: No focal deficit present. Mental Status: She is alert and oriented to person, place, and time. Cranial Nerves: No cranial nerve deficit. Gait: Gait normal.   Psychiatric:         Mood and Affect: Mood normal.          ASSESSMENT & PLAN   Diagnoses and all orders for this visit:    1. Routine general medical examination at a health care facility  -     VITAMIN D, 25 HYDROXY; Future  -     CBC W/O DIFF; Future  -     METABOLIC PANEL, COMPREHENSIVE; Future  -     LIPID PANEL; Future  -     TSH 3RD GENERATION; Future  -     HEMOGLOBIN A1C WITH EAG; Future    2. Mixed hyperlipidemia maintained on statin therapy  -     LIPID PANEL; Future    3. Prediabetes  -     HEMOGLOBIN A1C WITH EAG; Future    4.  PAF (paroxysmal atrial fibrillation) [facility])  Assessment & Plan:  Diagnosed 2018, Managed and monitored by cardiologist, Dr. Savita Langley, [facility]. No acute findings meriting change in the plan. Patient scheduled for follow up there today. Maintained on Eliquis and Amiodarone. 5. Vitamin D deficiency  -     VITAMIN D, 25 HYDROXY; Future    6. Screening mammogram for breast cancer  -     MARILU 3D TRISTIN W MAMMO BI SCREENING INCL CAD; Future    7. Screening for colorectal cancer  -     OCCULT BLOOD IMMUNOASSAY,DIAGNOSTIC; Future    Fasting labs checked today  Cardiovascular risk and specific lipid/LDL/HgBA1c goals assessed  Reviewed diet, nutrition, exercise, weight management, BMI/goals. Age/risk based screening recommendations, health maintenance & prevention counseling. Cancer screening USPTFS guidelines reviewed w/ pt today. Discussed benefits/positive/negative outcomes of screening based on age/risk stratification. Informed consent for/against screening based on pt's personal hx/risk factors. Updated in history above and health maintenance. Mammogram ordered  Refuses colonoscopy at this time. Cologuard is too expensive out of pocket. Prefers FIT  Reviewed medications and side effects  Further follow up & other recommendations pending review of labs.  If all remains good and stable, follow up in 1 yr, sooner prn

## 2022-06-01 NOTE — PROGRESS NOTES
Chief Complaint   Patient presents with    Complete Physical     fasting      1. \"Have you been to the ER, urgent care clinic since your last visit? Hospitalized since your last visit? \" No    2. \"Have you seen or consulted any other health care providers outside of the [address] [place] [address] since your last visit? \" Yes Where: pod Dr Zunilda Chaudhary, ortho Dr Iona Ramirez    3. For patients aged 39-70: Has the patient had a colonoscopy / FIT/ Cologuard? No      If the patient is female:    4. For patients aged 41-77: Has the patient had a mammogram within the past 2 years? 5/2021 in system      5. For patients aged 21-65: Has the patient had a pap smear?  2/11/21 in system

## 2022-06-02 LAB
25(OH)D3 SERPL-MCNC: 26 NG/ML (ref 30–100)
ALBUMIN SERPL-MCNC: 3.7 G/DL (ref 3.5–5)
ALBUMIN/GLOB SERPL: 1.3 {RATIO} (ref 1.1–2.2)
ALP SERPL-CCNC: 75 U/L (ref 45–117)
ALT SERPL-CCNC: 30 U/L (ref 12–78)
ANION GAP SERPL CALC-SCNC: 1 MMOL/L (ref 5–15)
AST SERPL-CCNC: 25 U/L (ref 15–37)
BILIRUB SERPL-MCNC: 0.6 MG/DL (ref 0.2–1)
BUN SERPL-MCNC: 13 MG/DL (ref 6–20)
BUN/CREAT SERPL: 14 (ref 12–20)
CALCIUM SERPL-MCNC: 9 MG/DL (ref 8.5–10.1)
CHLORIDE SERPL-SCNC: 107 MMOL/L (ref 97–108)
CHOLEST SERPL-MCNC: 160 MG/DL
CO2 SERPL-SCNC: 30 MMOL/L (ref 21–32)
CREAT SERPL-MCNC: 0.94 MG/DL (ref 0.55–1.02)
ERYTHROCYTE [DISTWIDTH] IN BLOOD BY AUTOMATED COUNT: 14 % (ref 11.5–14.5)
EST. AVERAGE GLUCOSE BLD GHB EST-MCNC: 114 MG/DL
GLOBULIN SER CALC-MCNC: 2.9 G/DL (ref 2–4)
GLUCOSE SERPL-MCNC: 91 MG/DL (ref 65–100)
HBA1C MFR BLD: 5.6 % (ref 4–5.6)
HCT VFR BLD AUTO: 42.7 % (ref 35–47)
HDLC SERPL-MCNC: 64 MG/DL
HDLC SERPL: 2.5 {RATIO} (ref 0–5)
HGB BLD-MCNC: 12.7 G/DL (ref 11.5–16)
LDLC SERPL CALC-MCNC: 72.8 MG/DL (ref 0–100)
MCH RBC QN AUTO: 30.7 PG (ref 26–34)
MCHC RBC AUTO-ENTMCNC: 29.7 G/DL (ref 30–36.5)
MCV RBC AUTO: 103.1 FL (ref 80–99)
NRBC # BLD: 0 K/UL (ref 0–0.01)
NRBC BLD-RTO: 0 PER 100 WBC
PLATELET # BLD AUTO: 223 K/UL (ref 150–400)
PMV BLD AUTO: 10.7 FL (ref 8.9–12.9)
POTASSIUM SERPL-SCNC: 4.8 MMOL/L (ref 3.5–5.1)
PROT SERPL-MCNC: 6.6 G/DL (ref 6.4–8.2)
RBC # BLD AUTO: 4.14 M/UL (ref 3.8–5.2)
SODIUM SERPL-SCNC: 138 MMOL/L (ref 136–145)
TRIGL SERPL-MCNC: 116 MG/DL (ref ?–150)
TSH SERPL DL<=0.05 MIU/L-ACNC: 1.4 UIU/ML (ref 0.36–3.74)
VLDLC SERPL CALC-MCNC: 23.2 MG/DL
WBC # BLD AUTO: 5.3 K/UL (ref 3.6–11)

## 2022-06-07 NOTE — PROGRESS NOTES
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 [x]   I87.2 Venous Insufficiency   [x]   I89.0 Lymphedema, other secondary  []   I83.019 Venous Stasis Ulcer LE, Right   []   I89.9 Unspecified Lymphatic Disorder []   I83.029 Venous Stasis Ulcer LE, Left   [x]   R60.9 Swelling not relieved by elevation []   Q82.0 Hereditary/ Congenital Lymphedema   []   C50.211 Malignant neoplasm of breast, Right []   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   []   C50.212 Malignant neoplasm of breast, Left []   L03.115 LE Cellulitis, Right   []    []   L03.116 LE Cellulitis, Left                                   Phyllistine Case    1965  Page 2 of 2    Physician Order for DME for Diagnosis of lymphedema as Listed on Statement of Medical Necessity, Page 1         Recommended Product:  Units Upper Extremity Rt Lt Units Lower Extremity Rt Lt    Circ-Aid, Ready Wrap, Sigvaris Arm    Inelastic binders (Lizeth Better)  []Foot   []Below Knee   []Knee   []Thigh      Circ-Aid Ready Wrap, Sigvaris hand    Marcos Jarod, night use []Full Leg  []Lower Leg      Tribute Arm, night use    Tribute, night use  []Full Leg  []Lower Leg      Marcos Jarod Arm, night use    Jose Sleeve Leg/ Foot, night use      Gradiant Compression Sleeves & Gloves  []Custom [] RM Arm:  []CCL1 []CCL2 []CCL3  []Custom [] RM Glove: []CCL1 []CCL2 []CCL3     4 Gradient Compression Stockings   [x]Custom  []RM Lower Extremity:   []CCL1       [x]CCL2         []CCL3   [x]Knee       []Thigh        []Waist Length x x    Jose sleeve arm w/ hand, night use    Tribute Wrap, night use      Compression Bra          Compression Vest         The above patient was referred for treatment of Lymphedema due to the diagnosis indicated above.   Lymphedema is a progressive and chronic condition. It may cause acute infections, muscle atrophy, discomfort, and connective tissue fibrosis with irreversible tissue damage, decreased ROM in the affected limb and diminished functional mobility possibly interfering with independence and ability to work. Recurrent infections and wound complications that commonly occur with Lymphedema often require hospitalization and extensive wound care, thus increasing medical costs. The patient has received complete decongestive therapy which includes manual lymphatic drainage, lymphedema specific exercises, compression bandaging, and hygiene/skin care. Goals of therapy are to reduce the edema and prevent re-accumulation of fluid with its complications. It is medically necessary for this patient to have daytime garments to control this condition. They will need 2 sets (one set to wear and one set to wash for adequate skin care and wearing time). Garments must be replaced every 4-6 months for effectiveness. There are no substitutes available that offer acceptable compression treatment for this Lymphedema patient. If further information is requested, please contact our certified lymphedema therapists at [phone].     [x] Brittny Saldaña PT, MSPT, CLT-KAIA [] Valentino Brock, MS, OTR/L, CLT []  Sadie Norwood, PT, CLT [] Sophia Schrader PTA, CLT, CSIS    Printed  Provider Name Karthikeyan Bradley PA-C Provider Signature  Date    Provider NPI

## 2022-06-07 NOTE — PROGRESS NOTES
Outpatient Lymphedema Clinic  a part of [address] [place], [place] [address] [place], [address]  Phone: [phone]  Fax: [phone]    Plan of Care/Statement of Necessity for Physical Therapy/Occupational Therapy Services  2-15    Patient name: Magalene Prader  : 1965  Provider#: [id number]  Referral source: Jennifer Grace PA-C      Medical/Treatment Diagnosis: Lymphedema, not elsewhere classified [I89.0]     Prior Hospitalization: see medical history     Comorbidities:  sleep apnea, venous insufficiency, atrial fibrillation, depression and a history of anxiety and panic attacks,    Prior Level of Function: Independent gait without any assistive device for community distances,   Medications: Verified on Patient Summary List  Start of Care: 2022      Onset Date: 2020  The Plan of Care and following information is based on the information from the initial evaluation. Assessment/ key information:   Magalene Prader is a 59 y.o. female who presents with bilateral lower extremity swelling that presents with indicators of lipedema and phlebolymphedema. She is utilizing some of her management tools and techniques but she needs to utilize her night compression and she continues to have trouble with suspension of her knee highs. She will require a different brand of knee high to address these issues. The chronic pain in her knees and feet limits her activity level and exercise. Patient is motivated and eager to improve her condition. Her condition is complicated by sleep apnea, venous insufficiency, atrial fibrillation, depression and a history of anxiety and panic attacks. Patient continues to benefit from complete decongestive therapy (CDT) including: Manual lymphatic drainage (MLD) technique, Short-stretch textile bandages/compression system to decongest limb and Kinesiotaping as appropriate.         This care is medically necessary due to the infection risk with lymphedema and to improve functional activities. CDT is necessary to resolve swelling to allow patient to return to wearing normal clothes/footwear and prevent worsening of symptoms, such as infections and/or hospitalizations. Patient will be independent with home program strategies to allow improved ADL ability and mobility and to allow patient to return to greatest functional independence. Evaluation Complexity History MEDIUM  Complexity : 1-2 comorbidities / personal factors will impact the outcome/ POC ; Examination MEDIUM Complexity : 3 Standardized tests and measures addressing body structure, function, activity limitation and / or participation in recreation  ;Presentation MEDIUM Complexity : Evolving with changing characteristics  ; Clinical Decision Making Other outcome measures LLIS scores 83% impairment  MEDIUM  Overall Complexity Rating: MEDIUM    Problem List: pain affecting function and edema affecting function, patient requires new compression garments to prevent worsening of swelling   Treatment Plan may include any combination of the following: Therapeutic exercise, Manual therapy, Patient education, Self Care training and Other: measuring and fitting for compression products, review of lymphedema management program  Patient / Family readiness to learn indicated by: asking questions, trying to perform skills and interest  Persons(s) to be included in education: patient (P)  Barriers to Learning/Limitations: None  Patient Goal (s):   reassess swelling and get compression stockings that will stay up better. Patient Self Reported Health Status: Not assessed  Rehabilitation Potential: good     Short term goals  Time frame: 7/30/2022  1. Patient will receive appropriately fitting lower extremity daytime compression products with improved suspension to promote optimal swelling management over time.   2.  Patient will be independent with don/doff of compression system and use in order to prevent reaccumulation of fluid at discharge    Frequency / Duration: Patient to be seen 1 visit every 3-6  weeks for 10-12  weeks dependent on compression garment delivery    Patient/ Caregiver education and instruction: self care and other reviewed lymphedema management techniques and home program, compression garments and garment ordering process    []  Plan of care has been reviewed with PTA      Certification Period: 6/9/2022- 9/30/20202      TALIB Chi, KARISHMA-KAIA 6/9/2022     ________________________________________________________________________    I certify that the above Therapy Services are being furnished while the patient is under my care. I agree with the treatment plan and certify that this therapy is necessary.     Physician's Signature:____________________  Date:____________Time: _________         Latanya Lawrence PA-C

## 2022-06-07 NOTE — PROGRESS NOTES
[facility]  Lymphedema Clinic  [address] [place] [address], [address] [facility] [place], [place], [facility] 22.    INITIAL EVALUATION    NAME: Ileana Tong AGE: 59 y.o. GENDER: female  DATE: 6/9/2022  REFERRING PHYSICIAN:   BERNADINE Fernandes  HISTORY AND BACKGROUND:   Primary Diagnosis:  · Lymphedema, not elsewhere classified (I89.0)  Other Treatment Diagnoses:   Other abnormalities of gait (R26.89)   Edema, unspecified (R60.9)   Venous insufficiency (I87.2)   PAF (paroxysmal atrial fibrillation) ([facility]) (I48.0)  · Obesity, morbid ([facility] (E66.01)  Date of Onset: 11/9/2020 Patient returns for 6 month re-evaluation. Present Symptoms and Functional Limitations: Patient returns to the clinic for reassessment and she has been wearing her custom compression knee highs daily and using her vasopneumatic device consistently. She does report increased pain with pump garment donning and doffing due to lower extremity arthritis. She reports she continues to struggle with managing her pain with regard to arthritis in the lower leg joints and including her feet. She reports she has not been wearing her micro massaging rebeca due to they are hot and they make her pants slide down secondary to the material is smooth. She is not performing skin care daily with Remedy lotion. Patient does remedial exercises throughout the day but is unable to do weight bearing cardio type activities secondary to the pain. She reports ongoing suspension issues with her compression knee highs. Patient reports leg swelling has been present for at least four years but she states that her legs have always \"big. \" She does report a history of lower leg weeping intermittently but has not experienced this since she had her vascular procedure. Patient had a  vascular ablation of the greater and lesser saphenous veins in the left leg.   Franklin reports she had the procedure 4-6 weeks ago and her incision sites are healed. She reports no family history of lymphedema. Lymphedema Life Impact Scale:  Scores 53 out of 68 with 83% impairment. Past Medical History:   Past Medical History:   Diagnosis Date    Colonoscopy refused 2/22/2019    Depression with anxiety 2/22/2019    History of mitral valve disorder 12/19/2013    In her late 20's; told it was \"benign\", no follow up    Neuropathy of both feet 6/1/2022    Podiatrist Dr. Rhona Corrigan, Gabapentin 5/1/2022    LILLIAM on CPAP 11/3/2020        PAF (paroxysmal atrial fibrillation) ([facility] [address] 2/22/2019    [facility] Rep[facility] Dr Heber Almanzar,     Panic attacks 4/17/2017    Perimenopause 8/20/2013    Since 2012    Plantar fasciitis, bilateral 2/22/2018    Podiatrist Dr Rhona Corrigan 1-2018, orthotics    Postmenopause, LMP ~ 45 yo, 2013, No HRT 9/19/2016    Prediabetes 6/1/2022    Primary osteoarthritis of both knees 5/5/2021    [facility], , 4-2021    Scoliosis 6/28/2013    Venous insufficiency of both lower extremities 10/17/2019    Vascular Dr. Gisselle Suarez Vitamin D deficiency 8/20/2013 2011     Past Surgical History:   Procedure Laterality Date    HX CHOLECYSTECTOMY  2019    HX COLONOSCOPY      REFUSES    HX CYST REMOVAL  15 yo    HX OTHER SURGICAL  2019    Vein Ablation left leg     Current Medications:    Current Outpatient Medications   Medication Sig    amiodarone (PACERONE) 400 mg tablet Take 400 mg by mouth daily.  multivitamin (ONE A DAY) tablet Take 1 Tablet by mouth daily. Centrum silver    gabapentin (NEURONTIN) 100 mg capsule Take 100 mg by mouth nightly. For neuropathy in feet since 5/1/2022 per Podiatrist Dr. Farzaneh Jarquin escitalopram oxalate (LEXAPRO) 10 mg tablet Take 1 Tablet by mouth every evening.  buPROPion XL (WELLBUTRIN XL) 150 mg tablet TAKE 1 TABLET BY MOUTH ONCE DAILY IN THE MORNING    ascorbic acid (VITAMIN C PO) Take 1,000 mg by mouth daily.  apixaban (ELIQUIS) 5 mg tablet Take 5 mg by mouth two (2) times a day.     atorvastatin (LIPITOR) 20 mg tablet Take 20 mg by mouth daily.  COQ10, UBIQUINOL, PO Take  by mouth daily. No current facility-administered medications for this encounter. Allergies: adhesive allergy  Allergies   Allergen Reactions    Bee Sting [Sting, Bee] Swelling    Crestor [Rosuvastatin] Other (comments)     Worsening ringing in ears, muscle aches, joint pain, tried again 2018, myalgias    Lipitor [Atorvastatin] Myalgia    Meloxicam Other (comments)     Inc blood pressure    Other Medication Other (comments)     Wellbutrin 300 mg---> Increased anxiety/nervousness/jittery; Lexapro >10 mg--->Increased sweating    Prozac [Fluoxetine] Other (comments)     Dizziness and lack of coordination        Prior Level of Function/Social/Work History/Personal factors and/or comorbidities impacting plan of care: Patient works full time as 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 She is  and resides with her  who is a Richy Hacker. .  Has taken a new position at her job that requires her to be on her feet more than her previous position. They have 3 cats. Living Situation: Private residence   Trainable Caregiver?: Yes, but patient's  is a farmer with limited spare time  Mobility: Independent gait without any assistive device for community distances  Sleeping Arrangement:  in bed  Adaptive Equipment Owned:  crutches  Other: Patient is able to don compression stockings independent. Previous Therapy:   Patient received treatment at [facility] Lymphedema program 1/6/2021 to 6/17/2021, 11/18/2021 to  1//2022   Compression/Lymphedema Equipment: Patient has a Flexitouch and a Lymphapress.  Custom [employer] compression knee highs (2 pairs), custom [employer] Tribute thigh length night compression garments, Bioflect micromassaging capris, sig varis comprecapri (patient does care for this product)    SUBJECTIVE:    I love the material of these stockings but still are not staying up on my legs. I have to pull them up a lot during the day and I really need to get something that will stay up on my legs. I am also having some thickening in the skin and texture on the back of my lower legs and what can I do about that? Patients goals for therapy: reassess swelling and get compression stockings that will stay up better. OBJECTIVE DATA SUMMARY:   EXAMINATION/PRESENTATION/DECISION MAKING:   Pain:  Patient reports current pain level of 5/10 numeric scale currently with reports of plantar fasciitis pain bilateral feet and pain in bilateral knees. She also reports \"zinger\" type shocking pain intermittently in the thighs. Pain increases to 8/10 numeric scale at the highest level. She reports she had to ice her legs last night secondary to increased pain level. Pain Scale 1: Numeric (0 - 10)  Pain Intensity 1: 5  Pain Location 1: Knee;Leg  Pain Orientation 1: Left;Right  Pain Description 1: Burning  Patient Stated Pain Goal: 0    Self-Care and ADLs: Modified independent to independent. Patient reports requiring increased time for some activities. She can don and doff compression garments for herself. She reports increased pain with donning and doffing her Flexitouch compression garments. Skin and Tissue Assessment:  Dermal Status: Intact and Scars-multiple, small are present along medial left leg.       Texture/Consistency: Boggy with brawniness noted along distal posterior aspect of bilateral lower legs superior to the ankle    Pigmentation/Color Change: Hyperpigmented and Hemosiderin    Anomalies: None noted currently    Circulatory: Pulses, Varicosities and Vascular studies ruled out DVT in past    Nails: Normal- toenails have nail polish in place today    Stemmers Sign: Negative    Height:  Height: 5' 8\" (172.7 cm)  Weight:  Weight: 146.3 kg (322 lb 9.6 oz) ( was 313.6bs on 11/18/2021)  BMI:  BMI (calculated): 49.1  (36 or greater: adversely affecting lymphedema)  Wound/Ulcer: N/A        Volumetric Measurements:   Right:  17,997.01mL ( decreased by 52.46ml since last reassessment 1/31/2022) Left:  17,537.08mL  ( increased by 304. 83ml since last reassessment 1/31/2022)   % Difference: right lower extremity 2.56% larger than left versus 4.53% larger on last assessment 1/31/2022 Dominance: right   (See scanned graph)  Patient body shape and swelling distribution is indicative of a lipedema with mild to no foot swelling and ankle cuffing. Range of Motion[facility] but decreased at knees due to arthritis  Strength: Not formally assessed 2* patient is able to complete all functional activities in the clinic today. Sensation:  Intact     Mobility:    Bed/Chair Mobility: Independent  Transfers: Independent  Gait: Independent  Endurance: good    Safety:  Patient is alert and oriented: yes  Safety awareness: good  Fall risk?: low  Patient given written fall prevention handout: Yes       Evaluation Time: 0770-9427  30 minutes    TREATMENT PROVIDED:   1. Treatment description:  Therapeutic Exercise and Procedure:   Patient is performing remedial exercises throughout the day. She exercise handouts and reports no questions about techniques today. Patient was performing walking activities as well but she currently reports that when she tries to return to walking it exacerbates her knee and plantar fasciitis pain. Treatment time:0   Minutes: 0    2. Treatment description:  Manual Therapy:  Reviewed skin care protocol with low pH lotion and patient is using Remedy lotion daily at home. Patient is knowledgeable in signs and symptoms of cellulitis and to contact her physician immediately if she experiences any of those symptoms. Patient is performing self manual lymph drainage and she demonstrated the basics of manual lymph drainage. She had no questions about the techniques today.   Patient expresses concern about increasing fibrosis on the posterior aspect of bilateral lower legs. Discussed current compression garments which she reports continue to fall down during the day requiring frequent adjustment and they need to be pulled back up. They are falling down into the region where the patient is noting increasing fibrosis. When they are pulled up on her leg they fit well. Discussed the light silicone band on this stocking which is not providing enough suspension for patient. Investigation into options yields that [facility] is still not offering a stronger silicone band on the knee high garments. Patient's insurance year ends at the end of the month. Discussed the availability of Medi and Juzo compression garments as patient did not like the Elvarex material when she tried it previously. Discussed delivery time frames, fitting and ordering process. Patient is opting to try Brianda Staff secondary to their tends to be less issues with achieving a good product fit with their stockings. Demonstrated custom flat-knit expert, expert silver, expert cotton and strong flat knit stockings. Patient expresses interest in Brianda Staff expert silver stockings for cooling benefit. Proceeded to measure for bilateral lower extremity custom Juzo expert silver knee highs with closed toes, wide silicone band starting in compression class 2 to be ordered with preferred vendor [email or web address].  Educated patient in some of the differences she will see between her current stockings and these upon delivery. Educated patient in 30 day remake period and addressed patient questions about garment reordering and that she can order her second set in a different material line such as the expert line and she could opt to have compression level increased to class 3 with the second set if needed. Patient has not been wearing her capris at this time. Patient is aware that she would benefit from thigh compression at this time. Patient to return for fitting upon delivery.   Discussed that current stocking issues could be contributing to posterior lower leg fibrosis. Patient reports she is not wearing her Tribute garments at night due to their are hot. Discussed that garments like the Tribute night garments are designed to reduce fibrosis as they are made up of foam chips and educated on the role of foam to break down fibrosis. Discussed that wearing the Tribute garments can help to reduce fibrosis and that bandaging can also serve this role. Patient has bandaged in the past.  Verbally reviewed bandaging sequence with her and she is knowledgeable of layering techniques. Also discussed that it would be easier for her if she bandaged over top of the Tributes at night which would improve the effectiveness of the foam chips to break down the fibrosis. Patient has been instructed in this technique in the past as well but verbally reviewed techniques with her and bandage application. Instructed her not to apply the Tribute jacket when she is bandaging over top of the Kothari bob. Explained the difference between the Tribute jacket on top and the bandaging on top. Discussed options with wrapping schedule at night and starting with alternating legs. Patient verbalized understanding of all instructions. Discussed vasopneumatic device use and patient is using her pump daily, but she reports significant pain with donning and doffing her compression pump garments secondary to her arthritis. Addressed concerns with patient and informed her that [employer] has come out with a zipper style garment for their new orders. Discussed situation with patient and therapist will contact [employer]  via email to see if she might be eligible, due to her pain issues, to receive the new garments. Patient to return for fitting upon delivery.   Treatment time:  5340-3964 Minutes: 58  Patient pain level post treatment: 5 out of 10 numeric scale  ASSESSMENT:   Melyssa Spicer is a 59 y.o. female who presents with bilateral lower extremity swelling that presents with indicators of lipedema and phlebolymphedema. She is utilizing some of her management tools and techniques but she needs to utilize her night compression and she continues to have trouble with suspension of her knee highs. She will require a different brand of knee high to address these issues. The chronic pain in her knees and feet limits her activity level and exercise. Patient is motivated and eager to improve her condition. Her condition is complicated by sleep apnea, venous insufficiency, atrial fibrillation, depression and a history of anxiety and panic attacks. Patient continues to benefit from complete decongestive therapy (CDT) including: Manual lymphatic drainage (MLD) technique, Short-stretch textile bandages/compression system to decongest limb and Kinesiotaping as appropriate. This care is medically necessary due to the infection risk with lymphedema and to improve functional activities. CDT is necessary to resolve swelling to allow patient to return to wearing normal clothes/footwear and prevent worsening of symptoms, such as infections and/or hospitalizations. Patient will be independent with home program strategies to allow improved ADL ability and mobility and to allow patient to return to greatest functional independence. Rehabilitation potential is considered to be Good. Factors which may influence rehabilitation potential include patient is eager to learn and motivated to improve her condition. Patient will benefit from 2-3 physical therapy visits over 10-12 weeks to optimize improvement in these areas.     PLAN OF CARE:   Recommendations and Planned Interventions: Manual lymph drainage/decongestive therapy, Compression garment fitting/provision, Lymphedema therapeutic exercise, Self-care training, Education in skin care and lymphedema precautions and Self-bandaging education per home program    GOALS  Short term goals  Time frame: 7/30/2022  1. Patient will receive appropriately fitting lower extremity daytime compression products with improved suspension to promote optimal swelling management over time. 2.  Patient will be independent with don/doff of compression system and use in order to prevent reaccumulation of fluid at discharge. Patient has participated in goal setting and agrees to work toward plan of care. Patient was instructed to call if questions or concerns arise.     Thank you for this referral.  TALIB Funk, KARISHMA-KAIA     Time Calculation: 92 mins     In time: 0813  Out time: 0945  Total Treatment Time (min): 92  Total Timed Codes (min): 62  1:1 Treatment Time ([facility] only): 62  Visit #: 1

## 2022-06-09 ENCOUNTER — HOSPITAL ENCOUNTER (OUTPATIENT)
Dept: PHYSICAL THERAPY | Age: 57
Discharge: HOME | End: 2022-06-09
Payer: COMMERCIAL

## 2022-06-09 VITALS — BODY MASS INDEX: 44.41 KG/M2 | WEIGHT: 293 LBS | HEIGHT: 68 IN

## 2022-06-09 PROCEDURE — 97162 PT EVAL MOD COMPLEX 30 MIN: CPT

## 2022-06-09 PROCEDURE — 97140 MANUAL THERAPY 1/> REGIONS: CPT

## 2022-06-09 PROCEDURE — 97161 PT EVAL LOW COMPLEX 20 MIN: CPT

## 2022-07-18 NOTE — PROGRESS NOTES
LYMPHEDEMA PT DAILY TREATMENT NOTE - [facility] 2-15    Patient Name: Jennyfer Moore  Date:2022  : 1965  [x]  Patient  Verified  Payor: MOLINA VALDIVIA / Plan: Monica Coleman PPO / Product Type: PPO /    In time:0800  Out time:0910  Total Treatment Time (min): 70  Total Timed Codes (min): 70  1:1 Treatment Time ( only): 70  Visit #: 2    Treatment Area: Lymphedema, not elsewhere classified [I89.0]    SUBJECTIVE  Pain Level (0-10 scale):5  out of 10 numeric scale  Any medication changes, allergies to medications, adverse drug reactions, diagnosis change, or new procedure performed?: [x] No    [] Yes (see summary sheet for update)  Subjective functional status/changes:   [] No changes reported  I don't think I sweat as much in these stockings with the silver. They are too loose at the top and tend to slide down. I could also use more looseness in the feet. I am thinking I would like to try a class 3. OBJECTIVE        70 min Manual Therapy    Kinesiotaping: N/A       Manual Lymphatic Drainage (MLD):  Area to decongest: LE: bilateral   Sequence used and effectiveness: Secondary sequence for lower extremities with trunk involvement. Deferred today for compression garment orders and modifications. Patient is skilled in self manual lymph drainage. Patient continues to present with foot pain. Therapist discussed 2 tools that she recently found that may be beneficial to promote cold or hot massage to plantar feet and shared the with patient. Debbi foot plantar fasciitis kirsty and Dr. Lizbeth Muñoz foot massaging ball. Discussed how they can be used to improve plantar foot tissue mobility for soft tissue work.    Skin/wound care/debridement: Reviewed skin care principles:   Skin is 100% intact today and patient is independent with skin care   Applied multi-layer compression bandaging to:  Patient is utilizing compression garments currently   Upper/Lower Extremity Compression: Fitted for and Measured for the following compression products:    LE: bilateral bilateral lower extremity: Knee high    Style: Flat-knit    Brand: [employer]    Type: Custom: Juzo silver compression class  2 with closed toes and wide band  Vendor: [employer]    Education: Daily wear schedule. Garment lifespan. Return and reordering process (by bringing prior garments into the clinic). Patient is skilled in garment donning and doffing and arrived with stockings correctly donned today. Stockings on assessment appear too short with right stocking shorter than left and patient reports garments are sliding down due to being too loose proximally. Assessed product and reassessed circumferences with decreased circumferences noted in bilateral calf regions and ankles since patient was measured for these stockings. Discussed doing a remake on these garments to address fit issues and reviewed remake policy for [employer] with patient. Patient also reports she needs to have her reorder done today per vendor due to the insurance year ended and she needs to order her second set for the previous insurance year. Discussed options and patient would like to try a class 3. Opted to remake the original pair in class 2 with new measurements and length changes and to place a new order for her second set in the class 3 rather than the current class 2. Remeasured bilateral lower extremities custom Juzo expert silver knee highs with circumference changes as needed. Increased foot circumference and foot length bilateral to address patient's foot sensitivities and per patient request for some increased room in the feet.   Sent a remake request order for patient and a new order for patient to [employer].    Patient/family demonstrated donning and doffing with independence     Rationale: to obtain new custom compression garments to improve the patients ability to manage swelling over time    0    Vasopneumatic Device:    [employer] was notified about patient garment needs and a request was made to try the new zipper garments if possible. They recommended patient have a virtual training session which patient reports has been done and she reports that her pain with garment use has improved post training but not resolved completely. Discussed the doffing issue and patient reports if she could just reach to push the garments off it would be more comfortable for her. Provided her with information on a dressing stick which has a hook on the hand that may help her with doffing the garments. Patient was excited to learn about this tool. Rationale: To improve lymphatic fluid movement and decrease swelling to improve the patients ability to perform ADL and IADL skills and prevent worsening of swelling over time. With   [] TE   [] TA   [x] MT   [] SC   [x] other: Patient Education: [x] Review HEP    [x] MLD Patient Education discussed foot soft tissue mobilization products for foot pain   [] Progressed/Changed HEP based on:   [] positioning   [] Kinesiotape   [] Skin care   [] wound care   [x] other:educated in compression garment use and donning and doffing, laundering and care, wearing schedule, reordering and remake process  []   Patient / caregiver re-demonstrated bandaging. [] Yes  [] No  Compression bandaging/garment precautions reviewed: [x] Yes  [] No     Other Objective/Functional Measures:     Height:  Height: 5' 8\" (172.7 cm)  Weight:  Weight: 145.9 kg (321 lb 9.6 oz)   BMI:  BMI (calculated): 48.9  (36 or greater: adversely affecting lymphedema)    right lower 17,897.95 ml today compared to 17,997.01mL  on 6/9/2022. left lower  17,223.15ml today compared to 17,537.08mL  on 6/9/2022. Percent difference today is 3.77% as compared to 2.56% right larger than left on evaluation.       Pain Level (0-10 scale) post treatment: 5 out of 10 numeric scale      ASSESSMENT/Changes in Function:   Patient returns for a fitting for new custom compression knee highs Juzo silver today. Garments are a little too short and are too loose so a remake request was submitted along with new measurements to address the changes needed. Patient requests to try a class 3 pair of knee highs as well. She needs to have a reorder placed for her second set ASAP secondary to insurance time frame limitations so ordered a second pair as well with the new measurements in a class 3. Limb volumes were decreased bilaterally today with measurable size decreases below the knee. This was affecting her stocking fit today. Patient will call with feedback on fit upon delivery and a follow-up fitting will be scheduled at that time if needed. If not problems are noted patient will return to therapy in 6 months for a reassessment of swelling status and compression product needs. Patient will continue to benefit from skilled PT services to  analyze compression product fit and use, instruct in home lymphedema management program and measure for compression products to attain remaining goals. [x]  See Plan of Care  [x]  See progress note/recertification  []  See Discharge Summary         Progress towards goals / Updated goals:     Short term goals  Time frame: 7/30/2022  1. Patient will receive appropriately fitting lower extremity daytime compression products with improved suspension to promote optimal swelling management over time. In progress. Current garments require a remake with measurement modifications. 2.  Patient will be independent with don/doff of compression system and use in order to prevent reaccumulation of fluid at discharge.   Goal met 7/10/2022    PLAN  []  Upgrade activities as tolerated     [x]  Continue plan of care  []  Update interventions per flow sheet       []  Discharge due to:_  [x]  Other:_  Fit for garments upon delivery if problems are noted     TALIB Clay, KARISHMA-KAIA 7/18/2022

## 2022-07-19 ENCOUNTER — HOSPITAL ENCOUNTER (OUTPATIENT)
Dept: PHYSICAL THERAPY | Age: 57
Discharge: HOME | End: 2022-07-19
Payer: COMMERCIAL

## 2022-07-19 VITALS — HEIGHT: 68 IN | WEIGHT: 293 LBS | BODY MASS INDEX: 44.41 KG/M2

## 2022-07-19 PROCEDURE — 97140 MANUAL THERAPY 1/> REGIONS: CPT

## 2022-07-19 NOTE — PROGRESS NOTES
[facility]  a part of [address] [place] [address] [place]  [address] [place], [address] [facility], [facility], [address]  Phone: [phone]  Fax: [phone]        Progress Note    Name: Modesta Buckner   : 1965   MD: Diana Red PAMATY       Treatment Diagnosis: Lymphedema, not elsewhere classified [I89.0]  Start of Care: 2022    Visits from Start of Care: 2  Missed Visits: 0    Summary of Care: Measured and fitted for compression garments. Reviewed home program and educated in soft tissue mobilization tools for foot pain. Assessment / Recommendations:   Patient returns for a fitting for new custom compression knee highs Juzo silver today. Garments are a little too short and are too loose so a remake request was submitted along with new measurements to address the changes needed. Patient requests to try a class 3 pair of knee highs as well. She needs to have a reorder placed for her second set ASAP secondary to insurance time frame limitations so ordered a second pair as well with the new measurements in a class 3. Limb volumes were decreased bilaterally today with measurable size decreases below the knee. This was affecting her stocking fit today. Patient will call with feedback on fit upon delivery and a follow-up fitting will be scheduled at that time if needed. If not problems are noted patient will return to therapy in 6 months for a reassessment of swelling status and compression product needs.      Patient will continue to benefit from skilled PT services to  analyze compression product fit and use, instruct in home lymphedema management program and measure for compression products to attain remaining goals. Short term goals  Time frame: 2022  1. Patient will receive appropriately fitting lower extremity daytime compression products with improved suspension to promote optimal swelling management over time. In progress.   Current garments require a remake with measurement modifications. Status at last note/certification: Initiated  Status at progress note: not met, in progress      2. Camille Crawford will be independent with don/doff of compression system and use in order to prevent reaccumulation of fluid at discharge. Goal met 7/10/2022  Status at last note/certification: Initiated  Status at progress note: met        Other: Continue with plan of care as established evaluation. Fit for remade compression garments if needed.        Howard Self, MSPT, CLT-KAIA 7/19/2022

## 2022-07-25 ENCOUNTER — HOSPITAL ENCOUNTER (OUTPATIENT)
Dept: MAMMOGRAPHY | Age: 57
Discharge: HOME | End: 2022-07-25
Attending: FAMILY MEDICINE
Payer: COMMERCIAL

## 2022-07-25 DIAGNOSIS — F41.8 DEPRESSION WITH ANXIETY: ICD-10-CM

## 2022-07-25 DIAGNOSIS — Z12.31 SCREENING MAMMOGRAM FOR BREAST CANCER: ICD-10-CM

## 2022-07-25 PROCEDURE — 77063 BREAST TOMOSYNTHESIS BI: CPT

## 2022-07-25 RX ORDER — ESCITALOPRAM OXALATE 10 MG/1
TABLET ORAL
Qty: 90 TABLET | Refills: 1 | Status: SHIPPED | OUTPATIENT
Start: 2022-07-25

## 2022-08-06 DIAGNOSIS — F41.8 DEPRESSION WITH ANXIETY: ICD-10-CM

## 2022-08-07 RX ORDER — BUPROPION HYDROCHLORIDE 150 MG/1
TABLET ORAL
Qty: 90 TABLET | Refills: 2 | Status: SHIPPED | OUTPATIENT
Start: 2022-08-07

## 2022-09-21 ENCOUNTER — TELEPHONE (OUTPATIENT)
Dept: FAMILY MEDICINE CLINIC | Age: 57
End: 2022-09-21

## 2022-09-21 NOTE — TELEPHONE ENCOUNTER
----- Message from Yvonne Kim sent at 9/20/2022  3:05 PM EDT -----  Subject: Message to Provider    QUESTIONS  Information for Provider? Pt would like to know if she needs to come in   for bloodwork, recently had physical on 6/1 and completed bloodwork around   the same time. Pt received a notification via my chart 9/20 about blood   work. Please cobntact to verify information as soon as possible. LVM for return call. I do see that Dr Burt Ferrari sent over result note and advised to schedule fasting CPE in 1 year.

## 2022-09-23 ENCOUNTER — TELEPHONE (OUTPATIENT)
Dept: FAMILY MEDICINE CLINIC | Age: 57
End: 2022-09-23

## 2022-09-23 NOTE — TELEPHONE ENCOUNTER
----- Message from Ritu Mitchell sent at 9/23/2022  3:54 PM EDT -----  Subject: Refill Request    QUESTIONS  Name of Medication? Other - For sinus infection   Patient-reported dosage and instructions? Na   How many days do you have left? 0  Preferred Pharmacy? [facility] phone number (if available)? [phone]  ---------------------------------------------------------------------------  --------------  Yulissa LOPEZ  What is the best way for the office to contact you? OK to leave message on   voicemail  Preferred Call Back Phone Number? [phone]  ---------------------------------------------------------------------------  --------------  SCRIPT ANSWERS  Relationship to Patient?  Self

## 2022-09-23 NOTE — TELEPHONE ENCOUNTER
Chief Complaint   Patient presents with    Sinus Infection     Drainage, sore throat and yellow mucus since 3 days.      Spoke to patient two identifier confirmed virtual visit has been set for 09/26/2022 at  11:40 am

## 2022-09-27 ENCOUNTER — VIRTUAL VISIT (OUTPATIENT)
Dept: FAMILY MEDICINE CLINIC | Age: 57
End: 2022-09-27

## 2022-09-27 DIAGNOSIS — J06.9 UPPER RESPIRATORY TRACT INFECTION, UNSPECIFIED TYPE: Primary | ICD-10-CM

## 2022-09-27 PROCEDURE — 99213 OFFICE O/P EST LOW 20 MIN: CPT | Performed by: FAMILY MEDICINE

## 2022-09-27 RX ORDER — PREGABALIN 50 MG/1
50 CAPSULE ORAL 2 TIMES DAILY
COMMUNITY
Start: 2022-09-03

## 2022-09-27 RX ORDER — DOXYCYCLINE 100 MG/1
100 TABLET ORAL 2 TIMES DAILY
Qty: 14 TABLET | Refills: 0 | Status: SHIPPED | OUTPATIENT
Start: 2022-09-27 | End: 2022-10-04

## 2022-09-27 NOTE — PROGRESS NOTES
Cyrus Clark, was evaluated through a synchronous (real-time) audio-video encounter. The patient (or guardian if applicable) is aware that this is a billable service, which includes applicable co-pays. This Virtual Visit was conducted with patient's (and/or legal guardian's) consent. The visit was conducted pursuant to the emergency declaration under the [facility], [address] [place] [address] and the [employer] and [employer] Act. Patient identification was verified, and a caregiver was present when appropriate. The patient was located at: Home: [address] [place] [address] [place] [id number]  The provider was located at: Facility (Appt Department): [address] [place] [address]       Mary Wu MD    Subjective: Cyrus Clark is a 59 y.o. F who was seen for:    Reports one week hx of URI symptoms of sore throat, sinus congestion, cough. Has abnormal discharge in both nasal and phlegem. Denies fevers. Negative COVID testing was done. No prior hx of lung issues. Current Outpatient Medications:     buPROPion XL (WELLBUTRIN XL) 150 mg tablet, TAKE 1 TABLET BY MOUTH ONCE DAILY IN THE MORNING, Disp: 90 Tablet, Rfl: 2    escitalopram oxalate (LEXAPRO) 10 mg tablet, TAKE 1 TABLET BY MOUTH ONCE DAILY IN THE EVENING, Disp: 90 Tablet, Rfl: 1    amiodarone (PACERONE) 400 mg tablet, Take 400 mg by mouth daily. , Disp: , Rfl:     multivitamin (ONE A DAY) tablet, Take 1 Tablet by mouth daily. Centrum silver, Disp: , Rfl:     gabapentin (NEURONTIN) 100 mg capsule, Take 100 mg by mouth nightly. For neuropathy in feet since 5/1/2022 per Podiatrist Dr. Marion Anguiano, Disp: , Rfl:     ascorbic acid (VITAMIN C PO), Take 1,000 mg by mouth daily. , Disp: , Rfl:     apixaban (ELIQUIS) 5 mg tablet, Take 5 mg by mouth two (2) times a day., Disp: , Rfl:     atorvastatin (LIPITOR) 20 mg tablet, Take 20 mg by mouth daily. , Disp: , Rfl:     COQ10, UBIQUINOL, PO, Take  by mouth daily. , Disp: , Rfl:     Allergies   Allergen Reactions    Bee Sting [Sting, Bee] Swelling    Crestor [Rosuvastatin] Other (comments)     Worsening ringing in ears, muscle aches, joint pain, tried again 2018, myalgias    Lipitor [Atorvastatin] Myalgia    Meloxicam Other (comments)     Inc blood pressure    Other Medication Other (comments)     Wellbutrin 300 mg---> Increased anxiety/nervousness/jittery; Lexapro >10 mg--->Increased sweating    Prozac [Fluoxetine] Other (comments)     Dizziness and lack of coordination       Review of Systems   Constitutional:  Negative for fever, malaise/fatigue and weight loss. HENT:  Positive for congestion and sinus pain. Respiratory:  Positive for cough. Negative for hemoptysis, shortness of breath and wheezing. Cardiovascular:  Negative for chest pain, palpitations, leg swelling and PND. Gastrointestinal:  Negative for abdominal pain, constipation, diarrhea, nausea and vomiting. Objective:   No flowsheet data found.      Constitutional: [x] Appears well-developed and well-nourished [x] No apparent distress      [] Abnormal -     Mental status: [x] Alert and awake  [x] Oriented to person/place/time [x] Able to follow commands    [] Abnormal -     Eyes:   EOM    [x]  Normal    [] Abnormal -   Sclera  [x]  Normal    [] Abnormal -          Discharge []  None visible   [] Abnormal -     HENT: [x] Normocephalic, atraumatic  [] Abnormal -   [] Mouth/Throat: Mucous membranes are moist    Neck: [x] No visualized mass [] Abnormal -     Pulmonary/Chest: [x] Respiratory effort normal   [x] No visualized signs of difficulty breathing or respiratory distress        [] Abnormal -         Skin:        [x] No significant exanthematous lesions or discoloration noted on facial skin         [] Abnormal -            Psychiatric:       [x] Normal Affect [] Abnormal -        [x] No Hallucinations    Other pertinent observable physical exam findings:    Assessment & Plan:   Colby Arriaza is a 59 y.o. female who presents today for:    1. Upper respiratory tract infection, unspecified type  discussed diagnosis & treatment options, likely bacterial at this time. Will start meds below. Expected time course for resolution reviewed with patient. Reviewed signs and symptoms that would indicate a worsening medical condition which would require immediate evaluation and treatment; patient expressed understanding of plan. - doxycycline (ADOXA) 100 mg tablet; Take 1 Tablet by mouth two (2) times a day for 7 days. Dispense: 14 Tablet; Refill: 0       Medications Discontinued During This Encounter   Medication Reason    gabapentin (NEURONTIN) 100 mg capsule LIST CLEANUP           Treatment risks/benefits/costs/interactions/alternatives discussed with patient. Advised patient to call back or return to office if symptoms worsen/change/persist. If patient cannot reach us or should anything more severe/urgent arise he/she should proceed directly to the nearest emergency department. Discussed expected course/resolution/complications of diagnosis in detail with patient. Patient expressed understanding with the diagnosis and plan. This dictation may have been completed with Dragon, the [employer] voice recognition software. Unanticipated grammatical, syntax, homophones, and other interpretive errors are sometimes inadvertently transcribed by the computer software. Please disregard any errors that have escaped final proofreading. Mary Beth Jorgensen M.D.

## 2023-01-17 ENCOUNTER — HOSPITAL ENCOUNTER (OUTPATIENT)
Dept: PHYSICAL THERAPY | Age: 58
Discharge: HOME | End: 2023-01-17
Payer: COMMERCIAL

## 2023-01-17 VITALS — HEIGHT: 68 IN | BODY MASS INDEX: 44.41 KG/M2 | WEIGHT: 293 LBS

## 2023-01-17 PROCEDURE — 97140 MANUAL THERAPY 1/> REGIONS: CPT

## 2023-01-17 PROCEDURE — 97161 PT EVAL LOW COMPLEX 20 MIN: CPT

## 2023-01-17 NOTE — PROGRESS NOTES
PT/OT LYMPHEDEMA INITIAL EVALUATION NOTE - [facility] 2-15    Patient Name: Gloria Mauricio  Date:2023  : 1965  [x]  Patient  Verified  Payor: BLUE CROSS / Plan: Alice Shea [id number] PPO / Product Type: PPO /    In time: 810  Out time: 09  Total Treatment Time (min): 85  Total Timed Codes (min): 60  1:1 Treatment Time ( only): 60   Visit #: 1    Treatment Area: Lymphedema, not elsewhere classified [I89.0]    SUBJECTIVE  Pain Level (0-10 scale): 4 out of 10 numeric scale constant  posterior left  lower leg and now starting on right described as sharp and burning, 6 out of 10 numeric at worst   Any medication changes, allergies to medications, adverse drug reactions, diagnosis change, or new procedure performed?: [] No    [x] Yes (see summary sheet for update)  Subjective:    I really love these stockings the best (jobst confidence). These fit the best and stay up the best.  Prior level of Function:  Independent gait without any assistive device for community distances,  Modified independent to independent for self care. Patient reports requiring increased time for some activities. She can don and doff compression garments for herself. Mechanism of Injury: Patient returns to the clinic for reassessment and she has been wearing her custom compression knee highs daily. She reports she has not been wearing her micro massaging rebeca due to they are hot and they make her pants slide down secondary to the material is smooth. She is performing skin care daily with Remedy lotion. Patient does remedial exercises throughout the day. Vasopneumatic device use is on hold while they work to lower her blood pressure. Patient is not utilizing night compression garments secondary to reports that they are too hot. Patient reports leg swelling has been present for at least four to five years but she states that her legs have always \"big. \" She does report a history of lower leg weeping intermittently but has not experienced this since she had her vascular procedure. Patient had a  vascular ablation of the greater and lesser saphenous veins in the left leg. Franklin reports she had the procedure 4-6 weeks ago and her incision sites are healed. She reports no family history of lymphedema. Previous Treatment/Compliance: patient wears compression garments daily  PMHx/Surgical Hx:   Past Medical History:   Diagnosis Date    Colonoscopy refused 2/22/2019    Depression with anxiety 2/22/2019    History of mitral valve disorder 12/19/2013    In her late 19's; told it was \"benign\", no follow up    Neuropathy of both feet 6/1/2022    Podiatrist Dr. Daniel Winters, Gabapentin 5/1/2022    LILLIAM on CPAP 11/3/2020        [facility] 2/22/2019    Elisebettina Adams,     Panic attacks 4/17/2017    Perimenopause 8/20/2013    Since 2012    Plantar fasciitis, bilateral 2/22/2018    Podiatrist Dr Daniel Winters 1-2018, orthotics    Postmenopause, LMP ~ 45 yo, 2013, No HRT 9/19/2016    Prediabetes 6/1/2022    Primary osteoarthritis of both knees 5/5/2021    [facility]SI's, , 4-2021    Scoliosis 6/28/2013    Venous insufficiency of both lower extremities 10/17/2019    Vascular Dr. 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 She is  and resides with her  who is a Cate Carroll. .  Has taken a new position at her job that requires her to be on her feet more than her previous position. They have 3 cats.   Living Situation:  Private residence          Pt Goals: to gets some stockings that fit like the blue ones  Barriers: none noted  Motivation: patient is motivated to manage the condition  Substance use: none noted  Cognition: A & O x 4    Sleeping Arrangement:  in bed  Compression/Lymphedema Equipment: Patient has a Flexitouch and a Lymphapress. Custom Jobst Confidence compression knee highs (2 pairs), custom Solaris Tribute thigh length night compression garments, Bioflect micromassaging capris, sig varis comprecapri (patient does care for this product), Juzo silver- did not like as much    LLIS Score: scores 43 out of 68 with 63% impairment    OBJECTIVE/EXAMINATION    Skin and Tissue Assessment:  Dermal Status: Intact and Scars    Texture/Consistency: Boggy , with brawniness noted along distal posterior aspect of bilateral lower legs superior to the ankle     Pigmentation/Color Change: Hyperpigmented and Hemosiderin    Anomalies:  none noted currently    Circulatory: Pulses, Varicosities, and Vascular studies ruled out DVT in past    Nails: Normal, but 3 toes are bandaged on right foot secondary to procedure to remove ingrown toenail last week. Stemmers Sign: Negative    Height:  Height: 5' 8\" (172.7 cm)  Weight:  Weight: 146.4 kg (322 lb 12.8 oz)   BMI:  BMI (calculated): 49.1  (36 or greater: adversely affecting lymphedema)  Volumetric Measurements:   Right:  17,881. 46 mL (decreased by 16.49ml since last assessment 7/19/2022) Left:  17,268.26mL (increased by 45.11ml since last assessment 7/19/2022)   % Difference: 3.43% right larger than left versus right 3.77% larger than left on last assessment 7/19/2022 Dominance: right     0 min Therapeutic Exercise:  [] Mayda Drones Exercises [] Remedial Lymphedema Exercise Program [] Axillary Web Exercise Program      [] Shoulder ROM Exercises  Patient redemonstrated some of the basic remedial program today and she is preforming daily. She has the handout to follow but no longer needs it. Rationale:  Activate muscle pump to improve lymphatic fluid movement and decrease swelling to improve the patients ability to perform ADL and IADL skills and prevent worsening of swelling over time. 60 min Manual Therapy: Reviewed results of assessment today. Reviewed home program.  Reviewed vasopneumatic device use and frequency of use. Patient has not been using it as she has been off of her blood pressure meds and BP has been elevated. They are working on finding a medicine that will work for her and she does not experience significant side effects. This is in progress and she will resume pump use when BP stabilized. Reassessment results correspond with this finding as increase noted are primarily in the thighs. Skin/wound care/debridement: Reviewed skin care principles:   Skin care products  Hygiene  Prevention of cellulitis         Area to decongest: LE: bilateral   Sequence used and effectiveness: Secondary sequence for lower extremities without trunk involvement. Patient has been educated in the past.    Upper/Lower Extremity Compression: Measured for the following compression products:    LE: bilateral bilateral lower extremity: Knee high Top band silicone border Closed toe    Style: Flat-knit    Brand: Jobst    Type: Custom: Jobst confidence compression class 2 with closed toes and wide soft fit silicone band    Vendor: [facility]    Education: Daily wear schedule. Garment lifespan. Return and reordering process (by bringing prior garments into the clinic). Garment options and brands were discussed with her. Assessed products and patient prefers the Jobst confidence style and is now reporting that they are staying up well. She did not care for the Juzo silver and the class 3 made her legs tender to touch. Proceeded to measure for products. Educated patient in how to tell when the garment was ordered (date on tag) so she will be able to tell the difference between old and new garments as she prefers to have the arely blue for both new sets and has an old set in arely blue.   Also discussed possibly labeling the tag to allow her to use the newest sets rather than the old ones once the new ones are delivered. Patient verbalized understanding. Patient is scheduled for a follow-up fitting but she may opt to cancel that appt and request a reorder if the garments fit well on delivery. Patient demonstrated donning and doffing with independence      Rationale:  to decrease swelling and pain to improve the patients ability to manage swelling and prevent worsening over time             With   [x] TE   [] TA   [x] MT   [] SC   [x] other: Patient Education: [x] Review self lymphedema management program/HEP   [] MLD Patient Education   [] Progressed/Changed HEP based on:   [] positioning   [] Kinesiotape   [x] Skin care   [] wound care   [x] other: compression garments and ordering process, role of compression, role of vasopneumatic device, wearing schedules  []   Patient / caregiver re-demonstrated bandaging. [] Yes  [] No  Compression bandaging/garment precautions reviewed: [] Yes  [] No     Other Objective/Functional Measures:     Function:Modified independent to independent. Patient reports requiring increased time  and modification for some activities. She can don and doff compression garments for herself. She reports increased pain with donning and doffing her Flexitouch compression garments.             Pain Level (0-10 scale) post treatment: 4 out of 10 numeric scale    ASSESSMENT/Changes in Function:       [x]  See Plan of [id number], [place]  1/17/2023

## 2023-01-17 NOTE — THERAPY EVALUATION
Statement of Medical Necessity  Page 1 of 2      Vicente Rodriguez 1965 Today's Date: 1/17/2023 YEE: Lifetime   Payor: BLUE CROSS / Plan: [facility] PPO / Product Type: PPO /  ME: TBD  Refills: 2                   Diagnosis  []   I97.2 Post-Mastectomy Lymphedema []   I87.2 Venous Insufficiency   [x]   I89.0 Lymphedema, other secondary  []   I83.019 Venous Stasis Ulcer LE, Right   []   I89.9 Unspecified Lymphatic Disorder []   I83.029 Venous Stasis Ulcer LE, Left   [x]   R60.9 Swelling not relieved by elevation []   Q82.0 Hereditary/ Congenital Lymphedema   []   C50.211 Malignant neoplasm of breast, Right []   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   []   C50.212 Malignant neoplasm of breast, Left []   L03.115 LE Cellulitis, Right   []    []   L03.116 LE Cellulitis, Left                                   Vicente Rodriguez    1965  Page 2 of 2    Physician Order for DME for Diagnosis of Lymphedema as Listed on Statement of Medical Necessity, Page 1         Recommended Product:  Units Upper Extremity Rt Lt Units Lower Extremity Rt Lt    Circ-Aid, Ready Wrap, Sigvaris Arm    Inelastic binders (Circ-Aid, Farrow)  []Foot   []Below Knee   []Knee   []Thigh      Circ-Aid Ready Wrap, Sigvaris hand    Marcos Jarod, night use []Full Leg  []Lower Leg      Tribute Arm, night use    Tribute, night use  []Full Leg  []Lower Leg      Marcos Jarod Arm, night use    Jose Sleeve Leg/ Foot, night use      Gradiant Compression Sleeves  []Custom [] RM Arm:  []CCL1 []CCL2 []CCL3  []Custom [] RM:  []Glove  []Gauntlet:                         []CCL1 []CCL2 []CCL3      4 Gradient Compression Stockings   [x]Custom  []RM Lower Extremity:   []CCL1       [x]CCL2         [x]CCL3   [x]Knee       []Thigh        []Waist Length x x    Jose sleeve arm w/ hand, night use    Tribute Wrap, night use      Compression Bra   2 Waist length RM gradient compression stocking class 1 x x    Compression Vest         The above patient was referred for treatment of Lymphedema due to the diagnosis indicated above. Lymphedema is a progressive and chronic condition. It may cause acute infections, muscle atrophy, discomfort, and connective tissue fibrosis with irreversible tissue damage, decreased ROM in the affected limb and diminished functional mobility possibly interfering with independence and ability to work. Recurrent infections and wound complications that commonly occur with Lymphedema often require hospitalization and extensive wound care, thus increasing medical costs. The patient has received complete decongestive therapy which includes manual lymphatic drainage, lymphedema specific exercises, compression bandaging, and hygiene/skin care. Goals of therapy are to reduce the edema and prevent re-accumulation of fluid with its complications. It is medically necessary for this patient to have daytime garments to control this condition. They will need 2 sets (one set to wear and one set to wash for adequate skin care and wearing time). Garments must be replaced every 4-6 months for effectiveness. There are no substitutes available that offer acceptable compression treatment for this Lymphedema patient. If further information is requested, please contact our certified lymphedema therapists at [phone].     [x] Musa Jimenez PT, MSPT, CLT-KAIA [] Zane Monge, MS, OTR/L, CLT []  Artie Li, PT, CLT [] Rubens Curtis PTA, CLT, CSIS    Printed  Provider Name Tania Castillo PA-C Provider Signature  Date

## 2023-01-17 NOTE — THERAPY EVALUATION
Outpatient Lymphedema Clinic  a part of [address] [place] [address], [address]  Phone: [phone]  Fax: [phone]     Plan of Care/Statement of Necessity for Physical Therapy/Occupational Therapy Services  2-15     Patient name: Balwinder Peterson                          : 1965                          Provider#: [id number]  Referral source: Rico Dominguez PA-C                                                  Medical/Treatment Diagnosis: Lymphedema, not elsewhere classified [I89.0]                                 Prior Hospitalization: see medical history                                      Comorbidities:  sleep apnea, venous insufficiency, atrial fibrillation, depression and a history of anxiety and panic attacks,    Prior Level of Function: Independent gait without any assistive device for community distances,   Medications: Verified on Patient Summary List  Start of Care:  2023                                                               Onset Date: 2020  The Plan of Care and following information is based on the information from the initial evaluation. Assessment/ key information:   Balwinder Peterson is a 59 y.o. female who presents with bilateral lower extremity swelling that presents with indicators of lipedema and phlebolymphedema. Her limb volumes are essential stable today and her compression garments are due for replacement. The chronic pain in her knees and feet continues to limit her activity level and exercise. Patient is motivated and eager to improve her condition. Her condition is complicated by sleep apnea, venous insufficiency, atrial fibrillation, depression and a history of anxiety and panic attacks.  Patient continues to benefit from complete decongestive therapy (CDT) including: Manual lymphatic drainage (MLD) technique, Short-stretch textile bandages/compression system to decongest limb and Kinesiotaping as appropriate. This care is medically necessary due to the infection risk with lymphedema and to improve functional activities. CDT is necessary to resolve swelling to allow patient to return to wearing normal clothes/footwear and prevent worsening of symptoms, such as infections and/or hospitalizations. Patient will be independent with home program strategies to allow improved ADL ability and mobility and to allow patient to return to greatest functional independence. Evaluation Complexity History MEDIUM  Complexity : 1-2 comorbidities / personal factors will impact the outcome/ POC ; Examination MEDIUM Complexity : 3 Standardized tests and measures addressing body structure, function, activity limitation and / or participation in recreation  ;Presentation low Complexity : stable characteristics  ; Clinical Decision Making Other outcome measures LLIS scores 63% impairment  MEDIUM  Overall Complexity Rating:  LOW     Problem List: pain affecting function and edema affecting function, patient requires new compression garments to prevent worsening of swelling       Treatment Plan may include any combination of the following: Therapeutic exercise, Manual therapy, Patient education, Self Care training and Other: measuring and fitting for compression products, review of lymphedema management program  Patient / Family readiness to learn indicated by: asking questions, trying to perform skills and interest  Persons(s) to be included in education: patient (P)  Barriers to Learning/Limitations: None  Patient Goal (s):    to gets some stockings that fit like the blue ones   Rehabilitation Potential: good      Short term goals  Time frame:  to be met in 4 visits  1. Patient will receive appropriately fitting lower extremity daytime compression products to promote optimal swelling management over time.   2.  Patient will be independent with don/doff of compression system and use in order to prevent reaccumulation of fluid at discharge     Frequency / Duration: Patient to be seen 1 visit every 5-7  weeks for a total of 4 treatment visits over 14 weeks     Patient/ Caregiver education and instruction: self care and other reviewed lymphedema management techniques and home program, compression garments and garment ordering process     []  Plan of care has been reviewed with PTA        Certification Period: 1/17/2023 to 5/30/2023      TALIB Carpenter, MEERAT-KAIA  1/17/2023     ________________________________________________________________________     I certify that the above Therapy Services are being furnished while the patient is under my care. I agree with the treatment plan and certify that this therapy is necessary.      Physician's Signature:____________________  Date:____________Time: _________                                          Gilbert Delcid PA-C

## 2023-01-20 ENCOUNTER — VIRTUAL VISIT (OUTPATIENT)
Dept: FAMILY MEDICINE CLINIC | Age: 58
End: 2023-01-20
Payer: COMMERCIAL

## 2023-01-20 DIAGNOSIS — Z71.89 ENCOUNTER FOR MEDICATION REVIEW AND COUNSELING: ICD-10-CM

## 2023-01-20 DIAGNOSIS — F41.8 DEPRESSION WITH ANXIETY: Primary | ICD-10-CM

## 2023-01-20 RX ORDER — DILTIAZEM HYDROCHLORIDE 120 MG/1
120 CAPSULE, EXTENDED RELEASE ORAL DAILY
COMMUNITY
Start: 2023-01-16

## 2023-01-20 NOTE — PROGRESS NOTES
VIRTUAL VISIT    Patient seen via virtual visit today for the following:  Chief Complaint   Patient presents with    Medication Evaluation     Lexapro       HISTORY OF PRESENT ILLNESS   HPI  Patient seen via virtual visit today to discuss Lexapro. Last week we got a refill request for the Lexapro 10 mg daily which she takes w/ good control of chronic depression and anxiety. When the prescription was being sent in by me electronically, there was a potential harmful drug-drug interaction warning between that and the Pacerone that was coming up on her active medication list  prescribed by her cardiologist. When the nurse contacted patient to alert her to this and that we may need to change it, patient then advised us that the cardiologist had taken her off that quite some time ago and put her back on the [place] which works better for her. At that point we went ahead and submitted the Lexapro refill to her pharmacy which was confirmed on 1-16-23. Patient was not aware that the medication refill had already been submitted but has not missed any doses at this time. She takes it qpm, it continues to work well for her and she continues to tolerate the low dose of 10 mg. She has no new complaints or concerns at this time and is feeling well. REVIEW OF SYMPTOMS   Review of Systems   Constitutional: Negative. Respiratory: Negative. Cardiovascular: Negative. Gastrointestinal: Negative. Neurological: Negative. Psychiatric/Behavioral: Negative.            PROBLEM LIST/MEDICAL HISTORY     Problem List  Date Reviewed: 1/20/2023            Codes Class Noted    Neuropathy of both feet ICD-10-CM: G57.93  ICD-9-CM: 356.9  6/1/2022    Overview Signed 6/1/2022 11:50 AM by Natan Monge MD     Podiatrist Dr. Kimberly Muro, Gabapentin 5/1/2022             Prediabetes ICD-10-CM: R73.03  ICD-9-CM: 790.29  6/1/2022        Primary osteoarthritis of both knees ICD-10-CM: M17.0  ICD-9-CM: 715.16  5/5/2021    Overview Signed 5/5/2021  9:42 AM by Carmen Marroquin MD     [facility], , [phone]             LILLIAM on CPAP ICD-10-CM: G47.33, Z99.89  ICD-9-CM: 327.23, V46.8  11/3/2020    Overview Signed 11/3/2020  8:26 AM by Carmen Marroquin MD                  BMI 40.0-44.9, adult [facility]) ICD-10-CM: Z68.41  ICD-9-CM: V85.41  7/10/2020        Venous insufficiency of both lower extremities ICD-10-CM: I87.2  ICD-9-CM: 459.81  10/17/2019    Overview Signed 10/17/2019  8:47 AM by Carmen Marroquin MD     Vascular Dr. Quinton Amaro             PAF (paroxysmal atrial fibrillation) [facility]) ICD-10-CM: I48.0  ICD-9-CM: 427.31  2/22/2019    Overview Signed 2/22/2019  8:28 AM [facility],              Depression with anxiety ICD-10-CM: F41.8  ICD-9-CM: 300.4  2/22/2019    Overview Signed 11/3/2020  8:46 AM by Carmen Marroquin MD     Counseling [phone]             Colonoscopy refused ICD-10-CM: Z53.20  ICD-9-CM: V64.2  2/22/2019        Obesity, morbid ([address] ICD-10-CM: E66.01  ICD-9-CM: 278.01  2/22/2018        Plantar fasciitis, bilateral ICD-10-CM: M72.2  ICD-9-CM: 728.71  2/22/2018    Overview Signed 2/22/2018 11:30 AM by Carmen Marroquin MD     Podiatrist Dr Abigail Mayers 1-2018, orthotics             Panic attacks ICD-10-CM: F41.0  ICD-9-CM: 300.01  4/17/2017        Anxiety ICD-10-CM: F41.9  ICD-9-CM: 300.00  9/19/2016    Overview Signed 9/19/2016  2:26 PM by Carmen Marroquin MD     [phone]             Postmenopause, LMP ~ 45 yo, 2013, No HRT ICD-10-CM: Z78.0  ICD-9-CM: V49.81  9/19/2016        Palpitations ICD-10-CM: R00.2  ICD-9-CM: 785.1  1/9/2015    Overview Signed 4/2/2015 12:40 PM by Jose R Jimenez MD     1/16 event monitor, pac's otherwise unremarkable             SOB (shortness of breath) ICD-10-CM: R06.02  ICD-9-CM: 786.05  1/9/2015        History of mitral valve disorder ICD-10-CM: Z86.79  ICD-9-CM: V12.59  12/19/2013    Overview Signed 12/19/2013  3:57 PM by Carmen Marroquin MD     In her late 19's; told it was \"benign\", no follow up             Vitamin D deficiency ICD-10-CM: E55.9  ICD-9-CM: 268.9  8/20/2013    Overview Signed 8/20/2013  2:18 PM by Carmen Marroquin MD     2011             Perimenopause ICD-10-CM: N95.1  ICD-9-CM: 627.2  8/20/2013    Overview Signed 8/20/2013  2:21 PM by Carmen Marroquin MD     Since 2012             Scoliosis ICD-10-CM: M41.9  ICD-9-CM: 737.30  6/28/2013    Overview Signed 6/28/2013 10:18 AM by Carmen Marroquin MD     Since childhood; no surgery or brace; PT only             Mixed hyperlipidemia ICD-10-CM: E78.2  ICD-9-CM: 272.2  2/7/2011        Esophageal reflux ICD-10-CM: K21.9  ICD-9-CM: 530.81  2/7/2011               PAST SURGICAL HISTORY     Past Surgical History:   Procedure Laterality Date    HX CHOLECYSTECTOMY  2019    HX COLONOSCOPY      REFUSES    HX CYST REMOVAL  15 yo    HX OTHER SURGICAL  2019    Vein Ablation left leg         MEDICATIONS     Current Outpatient Medications   Medication Sig    Cartia  mg capsule Take 120 mg by mouth daily. escitalopram oxalate (LEXAPRO) 10 mg tablet TAKE 1 TABLET BY MOUTH ONCE DAILY IN THE EVENING    buPROPion XL (WELLBUTRIN XL) 150 mg tablet TAKE 1 TABLET BY MOUTH ONCE DAILY IN THE MORNING    multivitamin (ONE A DAY) tablet Take 1 Tablet by mouth daily. Centrum silver    ascorbic acid (VITAMIN C PO) Take 1,000 mg by mouth daily. apixaban (ELIQUIS) 5 mg tablet Take 5 mg by mouth two (2) times a day. atorvastatin (LIPITOR) 20 mg tablet Take 20 mg by mouth daily. COQ10, UBIQUINOL, PO Take  by mouth daily. No current facility-administered medications for this visit.           ALLERGIES     Allergies   Allergen Reactions    Bee Sting [Sting, Bee] Swelling    Crestor [Rosuvastatin] Other (comments)     Worsening ringing in ears, muscle aches, joint pain, tried again 2018, myalgias    Meloxicam Other (comments)     Inc blood pressure    Other Medication Other (comments)     Wellbutrin 300 mg---> Increased anxiety/nervousness/jittery;  Lexapro >10 mg--->Increased sweating    Prozac [Fluoxetine] Other (comments)     Dizziness and lack of coordination          SOCIAL HISTORY     Social History     Tobacco Use    Smoking status: Never    Smokeless tobacco: Never   Substance Use Topics    Alcohol use: No     Social History     Social History Narrative        No children    Lives at home w/  in [facility]        Diet: nothing special, not healthy eating habits; did low carb, low fat diet through [employer] 2021-2021    Exercise: none due to her knee arthritis    Caffeine: 1 large mug of half caff coffee a day    Weight: 290's-310's over the years; did [employer] 2021-2021, stopped due to cost     Social History     Substance and Sexual Activity   Sexual Activity Yes    Partners: Male    Comment: Menopause       IMMUNIZATIONS     Immunization History   Administered Date(s) Administered    COVID-19, MODERNA BLUE border, Primary or Immunocompromised, (age 18y+), IM, 100 mcg/0.5mL 2021, 2021    COVID-19, PFIZER PURPLE top, DILUTE for use, (age 15 y+), IM, 30mcg/0.3mL 10/28/2021    Tdap 2013    Zoster Recombinant 2021, 2021    Zoster Vaccine, Live 2021         FAMILY HISTORY     Family History   Problem Relation Age of Onset    Breast Cancer Mother         diagnosed in her 52's, survivor    Other Mother 64        heart arrhythmia    Heart Failure Mother     COPD Mother          age 80,     Cancer Father 61        lung    Diabetes Father         type 2    Hypertension Father     No Known Problems Sister     No Known Problems Brother     No Known Problems Brother     Breast Cancer Maternal Grandmother          in her late 55's-59's    Heart Attack Maternal Grandfather          in his mid [de-identified] from an MI    Other Paternal Grandmother  natural causes in old age    Other Paternal Grandfather          in car accident    Other Sister 50        heart arrhythmia    Breast Cancer Maternal Aunt          VITALS   There were no vitals available for today's virtual visit. Any patient self reported vitals are noted below:  No flowsheet data found. PHYSICAL EXAMINATION   Physical Exam  Constitutional:       General: She is not in acute distress. Appearance: Normal appearance. Neurological:      Mental Status: She is alert and oriented to person, place, and time. Mental status is at baseline. Psychiatric:         Mood and Affect: Mood and affect normal.         Speech: Speech normal.         Behavior: Behavior normal.         Thought Content: Thought content normal.         Cognition and Memory: Cognition normal.         Judgment: Judgment normal.               ASSESSMENT & PLAN       ICD-10-CM ICD-9-CM    1. Depression with anxiety  F41.8 300.4       2. Encounter for medication review and counseling  Z71.89 V65.49         Controlled, stable on current regimen of Lexapro 10 mg daily. No changes at this time. Refill was already submitted on 23 and patient will pick that up from pharmacy. Reviewed medications, effects, potential interactions and possible side effects. Updated medication list today as detailed above. Doing well on current regimen w/o adverse reactions or side effects. Schedule fasting CPE for . Follow up sooner in the interim prn. Patient was evaluated through a synchronous (real-time) audio-video encounter. The patient (or guardian if applicable) is aware that this is a billable service. Verbal consent to proceed has been obtained within the past 12 months. The visit was conducted pursuant to the emergency declaration under the [facility], [address] [place] [address] authority and the [employer] and [employer] General Act.   Patient identification was verified, and a caregiver was present when appropriate. The patient was located in a state where the provider was credentialed to provide care. Jason Valentine, was evaluated through a synchronous (real-time) audio-video encounter. The patient (or guardian if applicable) is aware that this is a billable service, which includes applicable co-pays. This Virtual Visit was conducted with patient's (and/or legal guardian's) consent. The visit was conducted pursuant to the emergency declaration under the [address] [place] [address], [address] [place] [address] authority and the [employer] General Act. Patient identification was verified, and a caregiver was present when appropriate. The patient was located at: Other: Work  The provider was located at: Home: Marco Martinez MD on 1/20/2023 at 9:16 AM    An electronic signature was used to authenticate this note. Total Time: minutes: 20 . An electronic signature was used to authenticate this note.   ~Lian Jones MD~

## 2023-01-24 ENCOUNTER — HOSPITAL ENCOUNTER (OUTPATIENT)
Dept: ULTRASOUND IMAGING | Age: 58
Discharge: HOME | End: 2023-01-24
Attending: STUDENT IN AN ORGANIZED HEALTH CARE EDUCATION/TRAINING PROGRAM
Payer: COMMERCIAL

## 2023-01-24 ENCOUNTER — OFFICE VISIT (OUTPATIENT)
Dept: FAMILY MEDICINE CLINIC | Age: 58
End: 2023-01-24
Attending: STUDENT IN AN ORGANIZED HEALTH CARE EDUCATION/TRAINING PROGRAM
Payer: COMMERCIAL

## 2023-01-24 VITALS
SYSTOLIC BLOOD PRESSURE: 119 MMHG | DIASTOLIC BLOOD PRESSURE: 75 MMHG | RESPIRATION RATE: 17 BRPM | TEMPERATURE: 98.2 F | BODY MASS INDEX: 44.41 KG/M2 | HEIGHT: 68 IN | WEIGHT: 293 LBS | OXYGEN SATURATION: 98 % | HEART RATE: 66 BPM

## 2023-01-24 DIAGNOSIS — R39.9 UTI SYMPTOMS: Primary | ICD-10-CM

## 2023-01-24 DIAGNOSIS — N39.41 URGE INCONTINENCE: ICD-10-CM

## 2023-01-24 DIAGNOSIS — R10.32 LLQ PAIN: ICD-10-CM

## 2023-01-24 DIAGNOSIS — I48.0 PAF (PAROXYSMAL ATRIAL FIBRILLATION) (HCC): ICD-10-CM

## 2023-01-24 LAB
BILIRUB UR QL STRIP: NEGATIVE
GLUCOSE UR-MCNC: NEGATIVE MG/DL
KETONES P FAST UR STRIP-MCNC: NEGATIVE MG/DL
PH UR STRIP: 6.5 [PH] (ref 4.6–8)
PROT UR QL STRIP: NEGATIVE
SP GR UR STRIP: 1.02 (ref 1–1.03)
UA UROBILINOGEN AMB POC: NORMAL (ref 0.2–1)
URINALYSIS CLARITY POC: CLEAR
URINALYSIS COLOR POC: YELLOW
URINE BLOOD POC: NEGATIVE
URINE LEUKOCYTES POC: NEGATIVE
URINE NITRITES POC: NEGATIVE

## 2023-01-24 PROCEDURE — 81001 URINALYSIS AUTO W/SCOPE: CPT | Performed by: STUDENT IN AN ORGANIZED HEALTH CARE EDUCATION/TRAINING PROGRAM

## 2023-01-24 PROCEDURE — 76705 ECHO EXAM OF ABDOMEN: CPT

## 2023-01-24 PROCEDURE — 99214 OFFICE O/P EST MOD 30 MIN: CPT | Performed by: STUDENT IN AN ORGANIZED HEALTH CARE EDUCATION/TRAINING PROGRAM

## 2023-01-24 RX ORDER — NITROFURANTOIN 25; 75 MG/1; MG/1
100 CAPSULE ORAL 2 TIMES DAILY
Qty: 10 CAPSULE | Refills: 0 | Status: CANCELLED | OUTPATIENT
Start: 2023-01-24 | End: 2023-01-29

## 2023-01-24 NOTE — PROGRESS NOTES
Chief Complaint   Patient presents with    Urinary Frequency     Patient sates is UTI or kidney stones, has been having frequency urination, lower ab spasm pain when urinating and has been going on for two weeks. 1. \"Have you been to the ER, urgent care clinic since your last visit? Hospitalized since your last visit? \" No    2. \"Have you seen or consulted any other health care providers outside of the [address] [place] [address] since your last visit? \" No     3. For patients aged 39-70: Has the patient had a colonoscopy / FIT/ Cologuard? Yes      If the patient is female:    4. For patients aged 41-77: Has the patient had a mammogram within the past 2 years? Yes - no Care Gap present      5. For patients aged 21-65: Has the patient had a pap smear?  Yes - no Care Gap present         3 most recent PHQ Screens 1/24/2023   Little interest or pleasure in doing things Not at all   Feeling down, depressed, irritable, or hopeless Not at all   Total Score PHQ 2 0   Trouble falling or staying asleep, or sleeping too much Not at all   Feeling tired or having little energy Not at all   Poor appetite, weight loss, or overeating Not at all   Feeling bad about yourself - or that you are a failure or have let yourself or your family down Not at all   Trouble concentrating on things such as school, work, reading, or watching TV Not at all   Moving or speaking so slowly that other people could have noticed; or the opposite being so fidgety that others notice Not at all   Thoughts of being better off dead, or hurting yourself in some way Not at all   PHQ 9 Score 0   How difficult have these problems made it for you to do your work, take care of your home and get along with others Not difficult at all       Health Maintenance Due   Topic Date Due    Colorectal Cancer Screening Combo  02/25/2020    COVID-19 Vaccine (4 - Booster for Moderna series) 12/23/2021    Flu Vaccine (1) Never done

## 2023-01-24 NOTE — PROGRESS NOTES
[facility]      Chief Complaint:     Chief Complaint   Patient presents with    Urinary Frequency     Patient sates is UTI or kidney stones, has been having frequency urination, lower ab spasm pain when urinating and has been going on for two weeks. Antolin Trujillo is a 59 y.o. female that presents for: urinary symptoms      Assessment/Plan:   I personally reviewed the following Pertinent Labs/Studies:   -reviewed prior urine cultures, no history of treatment resistant bacteria or hospitalizations      Diagnoses and all orders for this visit:    1. UTI symptoms: UA negative, sending for culture  -     AMB POC URINALYSIS DIP STICK AUTO W/ MICRO  -     CULTURE, URINE; Future    2. PAF (paroxysmal atrial fibrillation) ([facility]    3. Urge incontinence: symptom of urgency/frequency for months more consistent with urge incontinence  -     mirabegron ER (Myrbetriq) 25 mg ER tablet; Take 1 Tablet by mouth daily. 4. LLQ pain: in inguinal area, will rule out hernia   -      ABD LTD; Future         Follow up:      PRN    Subjective:   HPI:  Antolin Trujillo is a 59 y.o. female that presents for:    Urinary symptoms:  LLQ pain on and off for week, became constant today- dull ache, nothing makes better or worse. Last BM today. No nausea or vomiting. Drinking cranberry juice   Urinary frequency and urge incontinence x months    Flank pain: no  Fevers/chills: no  Abdominal pain: no  Nausea or vomiting: no  Vaginal discharge or concern for STI: no    Health Maintenance:  Health Maintenance Due   Topic Date Due    Colorectal Cancer Screening Combo  02/25/2020    COVID-19 Vaccine (4 - Booster for Moderna series) 12/23/2021    Flu Vaccine (1) Never done        ROS:   See HPI      Past medical history, social history, and medications personally reviewed.   Past Medical History:   Diagnosis Date    Colonoscopy refused 2/22/2019    Depression with anxiety 2/22/2019    History of mitral valve disorder 12/19/2013    In her late 20's; told it was \"benign\", no follow up    Neuropathy of both feet 6/1/2022    Podiatrist Dr. Feng Larson, Gabapentin 5/1/2022    LILLIAM on CPAP 11/3/2020        [facility]) [facility] 2/22/2019    Fariha Golden,     Panic attacks 4/17/2017    Perimenopause 8/20/2013    Since 2012    Plantar fasciitis, bilateral 2/22/2018    Podiatrist Dr Feng Larson 1-2018, orthotics    Postmenopause, LMP ~ 47 yo, 2013, No HRT 9/19/2016    Prediabetes 6/1/2022    Primary osteoarthritis of both knees 5/5/2021    [facility], , 4-2021    Scoliosis 6/28/2013    Venous insufficiency of both lower extremities 10/17/2019    Vascular Dr. Edgard Aranda    Vitamin D deficiency 8/20/2013 2011        Allergies personally reviewed. Allergies   Allergen Reactions    Bee Sting [Sting, Bee] Swelling    Crestor [Rosuvastatin] Other (comments)     Worsening ringing in ears, muscle aches, joint pain, tried again 2018, myalgias    Meloxicam Other (comments)     Inc blood pressure    Other Medication Other (comments)     Wellbutrin 300 mg---> Increased anxiety/nervousness/jittery; Lexapro >10 mg--->Increased sweating    Prozac [Fluoxetine] Other (comments)     Dizziness and lack of coordination          Objective:   Vitals reviewed. Visit Vitals  /75 (BP 1 Location: Left upper arm, BP Patient Position: Sitting, BP Cuff Size: Adult)   Pulse 66   Temp 98.2 °F (36.8 °C) (Temporal)   Resp 17   Ht 5' 8\" (1.727 m)   Wt 328 lb (148.8 kg)   LMP 07/19/2013   SpO2 98%   BMI 49.87 kg/m²        Physical Exam  Physical Exam     Vitals Reviewed. General AO x 3. No distress. Not diaphoretic. No jaundice. No cyanosis. No pallor. Abdominal Soft. No tenderness. No distension. No guarding or rebound. No CVA tenderness. I have reviewed pertinent labs results and other data. I have discussed the diagnosis with the patient and the intended plan as seen in the above orders.  The patient has received an after-visit summary and questions were answered concerning future plans. I have discussed medication side effects and warnings with the patient as well.     Mika Elder,   01/24/23

## 2023-01-26 LAB
BACTERIA SPEC CULT: NORMAL
SERVICE CMNT-IMP: NORMAL

## 2023-02-03 ENCOUNTER — TELEPHONE (OUTPATIENT)
Dept: FAMILY MEDICINE CLINIC | Age: 58
End: 2023-02-03

## 2023-02-03 NOTE — TELEPHONE ENCOUNTER
Spoke to pt she states that she doesn't feel like something is stuck. She has been drinking water and it goes down fine but burns. Warm water feels better. She didn't know if she should eat and see it that makes it move. Check asked about carbonation to see if that worked. Advised that it sounds like she could have scratched her throat. She can try some warm ginger ale that she has on hand. If any SOB or unable to swallow then ER.

## 2023-02-03 NOTE — TELEPHONE ENCOUNTER
Pt called stated \"I was eating peanut butter and crackers about 45 minutes ago. I have trouble swallowing no problems with airway. I tried drinking a little water and it hurts to swallow I have a stack pain between my shoulder blade. \" Nurse Nilda notified.      BCB# [phone]

## 2023-02-06 ENCOUNTER — TELEPHONE (OUTPATIENT)
Dept: FAMILY MEDICINE CLINIC | Age: 58
End: 2023-02-06

## 2023-02-06 NOTE — TELEPHONE ENCOUNTER
Called pt back. She states that she is still having the pain with swallowing. It isn't every time she swallows. When she drinks something cold she notices it more. She said it is sharp. It is about 10 inches down from the base of her neck. Sometimes it can hurt when she coughs. She doesn't have any trouble with swallowing. She states that her vitals are normal, denies fever. It all started the other day when she swallowed a cracker with peanut butter on it.

## 2023-02-06 NOTE — TELEPHONE ENCOUNTER
Patient called stated she spoke the nurse on Friday and having pain when she swallow in her shoulder pain and back    Requesting a call back    Best call back #[phone]

## 2023-02-07 NOTE — TELEPHONE ENCOUNTER
Called pt to let her know that Dr Julian Li recommends GI. I gave her the # for both [facility] and [facility].  ER if worsening sx's.

## 2023-02-07 NOTE — TELEPHONE ENCOUNTER
She should see GI so they can do an upper GI evaluation.  If anything worsens in the interim, go to ER

## 2023-03-02 ENCOUNTER — HOSPITAL ENCOUNTER (OUTPATIENT)
Dept: PHYSICAL THERAPY | Age: 58
Discharge: HOME | End: 2023-03-02
Payer: COMMERCIAL

## 2023-03-02 VITALS — WEIGHT: 293 LBS | BODY MASS INDEX: 44.41 KG/M2 | HEIGHT: 68 IN

## 2023-03-02 PROCEDURE — 97140 MANUAL THERAPY 1/> REGIONS: CPT

## 2023-03-02 NOTE — PROGRESS NOTES
[facility]  a part of [address] [place] [address] Chetna Crain, [address] [facility], [address]  Phone: [phone]  Fax: [phone]        Progress Note    Name: Becka Pabon   : 1965   MD: Cecilia Kenny PA-C       Treatment Diagnosis: Lymphedema, not elsewhere classified [I89.0]  Start of Care: 2023    Visits from Start of Care: 2 including eval  Missed Visits: 0    Summary of Care: measuring and fitting for compression garments, education in product use, patient is skilled in self lymphedema management techniques    Patient returns for garment fitting with some fit issues noted with regard to stocking length  Measurements for a remake to address issues were taken today and a remake request was sent to [facility].  Patient will call upon garment delivery to determine if additional fittings are needed. Provided patient with velfoam and educated in use to help decrease friction on dorsum of bilateral ankles. Removed the comfort zone at the ankle on the remake as this also seems to be contributing to the friction there rather than helping,     Patient will continue to benefit from skilled PT services to  analyze compression product fit and use, instruct in home lymphedema management program, and measure for compression products to attain remaining goals. Progress towards goals / Updated goals:  Time frame:  to be met in 4 visits  1. Patient will receive appropriately fitting lower extremity daytime compression products to promote optimal swelling management over time. Status at last note/certification: Initiated  Status at progress note: not met/ in progress     2.   Patient will be independent with don/doff of compression system and use in order to prevent reaccumulation of fluid at discharge   Status at last note/certification: Initiated  Status at progress note:  met         Other: Continue with treatment as established on plan of care       ARENDAL Diana Matthew, KARISHMA-KAIA  3/2/2023

## 2023-03-02 NOTE — PROGRESS NOTES
LYMPHEDEMA PT DAILY TREATMENT NOTE - [facility] 2-15    Patient Name: Jolie Reyes  Date:3/2/2023  : 1965  [x]  Patient  Verified  Payor: BLUE CROSS / Plan: [facility] PPO / Product Type: PPO /    In time:749  Out time:08  Total Treatment Time (min): 51  Total Timed Codes (min): 51  1:1 Treatment Time (MC only): 46   Visit #: 2     Treatment Area: Lymphedema, not elsewhere classified [I89.0]    SUBJECTIVE  Pain Level (0-10 scale): 3 out of 10 numeric scale   Any medication changes, allergies to medications, adverse drug reactions, diagnosis change, or new procedure performed?: [x] No    [] Yes (see summary sheet for update)  Subjective functional status/changes:   [] No changes reported  The stockings are bunching at my ankles and it really hurts by the end of the day. The ones without the ankle comfort zone feel better to me. I am wondering if the stockings are too long. OBJECTIVE     51 min Manual Therapy      Manual Lymphatic Drainage (MLD):  Skin/wound care/debridement:    Skin is 100% intact today with some callouses noted on dorsum of ankles and some scabs around right knee. Upper/Lower Extremity Compression: Fitted for the following compression products:    LE: bilateral bilateral lower extremity: Knee high    Style: Flat-knit    Brand: Jobst    Type: Custom: Jobst confidence compression class 2 with closed toes and wide soft fit silicone band    Vendor: [employer]    Education: Educated patient in compression garment donning and doffing. Garment lifespan. Return and reordering process (by bringing prior garments into the clinic). Assessed garment fit today and patient has been noting that the garment length seems to be too long  and when she pulls them all the way they work their way down and gather at the ankle. She is also having some friction issues and irritation at the Y measurement and she reports the old stockings are more comfortable there.   Reassessed measurements with no significant changes at the ankle or Y location and the size is the same. We added the comfort zone which does not feel any softer and is visibly affecting the fit with some looseness posterior to malleoli. Will remove comfort zone on the remake. Assessed overall garment length and they appear to be a little too long. [facility] has added an oblique option which we order and it appears to have made the garment too long medially. Opted to decrease the medial length bilaterally but patient would like to keep the oblique option . Also tightened the D circumference bilaterally at patient request.  A remake request has been requested with [employer] for patient and the new measurements were sent to the vendor. Garments were left here by patient to be sent back to vendor. Therapist provided patient with velfoam to cushion the dorsum of the ankle and cut rectangular pieces for bilateral legs today with patient education via demonstration in how to place the foam and instructions to monitor her skin. Provided patient with extra for home use. She left with pads in place and stated that the garments feel better with foam padding in place. Patient/family demonstrated donning and doffing with independence     Rationale:  patient will receive appropriately fitting compression garments  to improve the patients ability to manage swelling and prevent worsening over time     With   [] TE   [] TA   [x] MT   [] SC   [x] other: Patient Education: [] Review HEP    [] MLD Patient Education   [] Progressed/Changed HEP based on:   [] positioning   [] Kinesiotape   [] Skin care   [] wound care   [x] other: remake and return process, fit upon delivery, reordering     Patient / caregiver re-demonstrated bandaging.  [] Yes  [] No  Compression bandaging/garment precautions reviewed: [] Yes  [] No     Other Objective/Functional Measures:    Height:  Height: 5' 8\" (172.7 cm)  Weight:  Weight: 145 kg (319 lb 9.6 oz)   BMI:  BMI (calculated): 48.6  (36 or greater: adversely affecting lymphedema)     Pain Level (0-10 scale) post treatment: 3 out of 10 numeric scale      ASSESSMENT/Changes in Function:   Patient returns for garment fitting with some fit issues noted with regard to stocking length  Measurements for a remake to address issues were taken today and a remake request was sent to [facility].  Patient will call upon garment delivery to determine if additional fittings are needed. Provided patient with velfoam and educated in use to help decrease friction on dorsum of bilateral ankles. Removed the comfort zone at the ankle on the remake as this also seems to be contributing to the friction there rather than helping,     Patient will continue to benefit from skilled PT services to  analyze compression product fit and use, instruct in home lymphedema management program, and measure for compression products to attain remaining goals. []  See Plan of Care  [x]  See progress note/recertification  []  See Discharge Summary         Progress towards goals / Updated goals:  Time frame:  to be met in 4 visits  1. Patient will receive appropriately fitting lower extremity daytime compression products to promote optimal swelling management over time. In progress  2.   Patient will be independent with don/doff of compression system and use in order to prevent reaccumulation of fluid at discharge Met       PLAN  []  Upgrade activities as tolerated     [x]  Continue plan of care  []  Update interventions per flow sheet       []  Discharge due to:_  []  Other:_       TALIB Zayas, HONEY  3/2/2023

## 2023-04-24 DIAGNOSIS — F41.8 DEPRESSION WITH ANXIETY: ICD-10-CM

## 2023-04-24 RX ORDER — ESCITALOPRAM OXALATE 10 MG/1
TABLET ORAL
Qty: 90 TABLET | Refills: 0 | Status: SHIPPED | OUTPATIENT
Start: 2023-04-24

## 2023-05-09 RX ORDER — BUPROPION HYDROCHLORIDE 150 MG/1
TABLET ORAL
Qty: 90 TABLET | Refills: 0 | Status: SHIPPED | OUTPATIENT
Start: 2023-05-09

## 2023-05-09 NOTE — TELEPHONE ENCOUNTER
E-scribe request.  Thanks, Shadia    Last appointment: 1/24/23 Nory  Next appointment: 6/5/23 MD PIMENTEL  Previous refill encounter(s): 8/7/22 90 + 2    Requested Prescriptions     Pending Prescriptions Disp Refills    buPROPion (WELLBUTRIN XL) 150 MG extended release tablet [Pharmacy Med Name: buPROPion HCl ER (XL) 150 MG Oral Tablet Extended Release 24 Hour] 90 tablet 0     Sig: TAKE 1 TABLET BY MOUTH ONCE DAILY IN THE MORNING

## 2023-06-05 ENCOUNTER — OFFICE VISIT (OUTPATIENT)
Age: 58
End: 2023-06-05
Payer: COMMERCIAL

## 2023-06-05 VITALS
HEART RATE: 59 BPM | HEIGHT: 67 IN | WEIGHT: 293 LBS | SYSTOLIC BLOOD PRESSURE: 136 MMHG | BODY MASS INDEX: 45.99 KG/M2 | RESPIRATION RATE: 16 BRPM | OXYGEN SATURATION: 98 % | TEMPERATURE: 98 F | DIASTOLIC BLOOD PRESSURE: 74 MMHG

## 2023-06-05 DIAGNOSIS — R73.03 PREDIABETES: ICD-10-CM

## 2023-06-05 DIAGNOSIS — E78.2 MIXED HYPERLIPIDEMIA: ICD-10-CM

## 2023-06-05 DIAGNOSIS — Z53.20 COLONOSCOPY REFUSED: ICD-10-CM

## 2023-06-05 DIAGNOSIS — I87.2 VENOUS INSUFFICIENCY OF BOTH LOWER EXTREMITIES: ICD-10-CM

## 2023-06-05 DIAGNOSIS — E55.9 VITAMIN D DEFICIENCY: ICD-10-CM

## 2023-06-05 DIAGNOSIS — I48.0 PAF (PAROXYSMAL ATRIAL FIBRILLATION) (HCC): ICD-10-CM

## 2023-06-05 DIAGNOSIS — Z12.31 SCREENING MAMMOGRAM FOR BREAST CANCER: ICD-10-CM

## 2023-06-05 DIAGNOSIS — E66.01 OBESITY, MORBID, BMI 40.0-49.9 (HCC): ICD-10-CM

## 2023-06-05 DIAGNOSIS — N39.46 MIXED URGE AND STRESS INCONTINENCE: ICD-10-CM

## 2023-06-05 DIAGNOSIS — Z00.00 ANNUAL PHYSICAL EXAM: Primary | ICD-10-CM

## 2023-06-05 PROCEDURE — 99396 PREV VISIT EST AGE 40-64: CPT | Performed by: FAMILY MEDICINE

## 2023-06-05 SDOH — ECONOMIC STABILITY: FOOD INSECURITY: WITHIN THE PAST 12 MONTHS, YOU WORRIED THAT YOUR FOOD WOULD RUN OUT BEFORE YOU GOT MONEY TO BUY MORE.: NEVER TRUE

## 2023-06-05 SDOH — ECONOMIC STABILITY: FOOD INSECURITY: WITHIN THE PAST 12 MONTHS, THE FOOD YOU BOUGHT JUST DIDN'T LAST AND YOU DIDN'T HAVE MONEY TO GET MORE.: NEVER TRUE

## 2023-06-05 SDOH — ECONOMIC STABILITY: INCOME INSECURITY: HOW HARD IS IT FOR YOU TO PAY FOR THE VERY BASICS LIKE FOOD, HOUSING, MEDICAL CARE, AND HEATING?: NOT HARD AT ALL

## 2023-06-05 SDOH — ECONOMIC STABILITY: HOUSING INSECURITY
IN THE LAST 12 MONTHS, WAS THERE A TIME WHEN YOU DID NOT HAVE A STEADY PLACE TO SLEEP OR SLEPT IN A SHELTER (INCLUDING NOW)?: NO

## 2023-06-05 ASSESSMENT — ENCOUNTER SYMPTOMS
ALLERGIC/IMMUNOLOGIC NEGATIVE: 1
GASTROINTESTINAL NEGATIVE: 1
RESPIRATORY NEGATIVE: 1

## 2023-06-05 ASSESSMENT — PATIENT HEALTH QUESTIONNAIRE - PHQ9
SUM OF ALL RESPONSES TO PHQ QUESTIONS 1-9: 0
SUM OF ALL RESPONSES TO PHQ QUESTIONS 1-9: 0
1. LITTLE INTEREST OR PLEASURE IN DOING THINGS: 0
SUM OF ALL RESPONSES TO PHQ QUESTIONS 1-9: 0
2. FEELING DOWN, DEPRESSED OR HOPELESS: 0
SUM OF ALL RESPONSES TO PHQ9 QUESTIONS 1 & 2: 0
SUM OF ALL RESPONSES TO PHQ QUESTIONS 1-9: 0

## 2023-06-05 NOTE — PROGRESS NOTES
Chief Complaint   Patient presents with    Annual Exam     Fasting    Cholesterol Problem    Blood Sugar Problem    Incontinence     HISTORY OF PRESENT ILLNESS   HPI  Annual CPE  Fasting for labs  History of hyperlipidemia maintained on Lipitor 20 mg daily  History of prediabetes on no related meds    Diet: started Weight Watchers 2-2023; prior to then her eating habits were not good; in the past did low carb, low fat diet through [facility] 4/2021-7/2021      Exercise: started at [facility] 2-2023: water exercises 3 x a week x 1 hr; prior to that none due to knee arthritis but tolerating the pool well; since her weight has been coming down she is able to walk her dog ~ 1/2 mile 2-3 x a week since ~ 5-15-23    Caffeine: 1 large mug of half caff coffee a day      Weight: 290's-310's over the years; did [facility] 4/2021-7/2021, stopped due to cost; has lost ~15-20 lbs since 2-2023 when she started on Weight Watchers and Prep Program at [facility]; weight down from 328 lbs in 1-2023 to 301 lbs now    Has been having urinary urge incontinence issues since ~   Progressively getting worse   Noticeably more problematic since she started water aerobics at the pool and has embarrassing moments or inconvenience of having to try to rush out of the pool to get to the restroom  Becoming more inconvenient and doesn't want it to interfere w/ her exercise routine since she is making such good progress there  Also endorses some leaking w/ cough or valsalva  She has not needed to wear liners and has not had many wetting accidents but is getting close to that point  Saw Dr. Jj Luis 1-2023. Urine and culture negative. Was prescribed Myrbetriq but was too expensive. No pelvic pain, abnormal bleeding or other  symptoms       REVIEW OF SYMPTOMS   Review of Systems   Constitutional: Negative. HENT: Negative. Respiratory: Negative. Cardiovascular: Negative. Gastrointestinal: Negative. Endocrine: Negative.

## 2023-06-05 NOTE — PROGRESS NOTES
Chief Complaint   Patient presents with    Annual Exam    Cholesterol Problem     fasting    Blood Sugar Problem    Incontinence     urine       1. \"Have you been to the ER, urgent care clinic since your last visit? Hospitalized since your last visit? \" No    2. \"Have you seen or consulted any other health care providers outside of the [address] [place] [address] since your last visit? \" Oziel Purcell, lymphedema clinic    3. For patients aged 39-70: Has the patient had a colonoscopy / FIT/ Cologuard? NA - based on age      If the patient is female:    4. For patients aged 41-77: Has the patient had a mammogram within the past 2 years? Yes - no Care Gap present 7/2023      5. For patients aged 21-65: Has the patient had a pap smear?  Yes - no Care Gap present 2/2020 here

## 2023-06-06 LAB
25(OH)D3 SERPL-MCNC: 29.6 NG/ML (ref 30–100)
ALBUMIN SERPL-MCNC: 3.9 G/DL (ref 3.5–5)
ALBUMIN/GLOB SERPL: 1.3 (ref 1.1–2.2)
ALP SERPL-CCNC: 77 U/L (ref 45–117)
ALT SERPL-CCNC: 28 U/L (ref 12–78)
ANION GAP SERPL CALC-SCNC: 3 MMOL/L (ref 5–15)
AST SERPL-CCNC: 10 U/L (ref 15–37)
BILIRUB SERPL-MCNC: 0.6 MG/DL (ref 0.2–1)
BUN SERPL-MCNC: 16 MG/DL (ref 6–20)
BUN/CREAT SERPL: 18 (ref 12–20)
CALCIUM SERPL-MCNC: 9.4 MG/DL (ref 8.5–10.1)
CHLORIDE SERPL-SCNC: 104 MMOL/L (ref 97–108)
CHOLEST SERPL-MCNC: 171 MG/DL
CO2 SERPL-SCNC: 33 MMOL/L (ref 21–32)
CREAT SERPL-MCNC: 0.89 MG/DL (ref 0.55–1.02)
ERYTHROCYTE [DISTWIDTH] IN BLOOD BY AUTOMATED COUNT: 13.1 % (ref 11.5–14.5)
EST. AVERAGE GLUCOSE BLD GHB EST-MCNC: 117 MG/DL
GLOBULIN SER CALC-MCNC: 3.1 G/DL (ref 2–4)
GLUCOSE SERPL-MCNC: 88 MG/DL (ref 65–100)
HBA1C MFR BLD: 5.7 % (ref 4–5.6)
HCT VFR BLD AUTO: 44 % (ref 35–47)
HDLC SERPL-MCNC: 57 MG/DL
HDLC SERPL: 3 (ref 0–5)
HGB BLD-MCNC: 13.2 G/DL (ref 11.5–16)
LDLC SERPL CALC-MCNC: 86.6 MG/DL (ref 0–100)
MCH RBC QN AUTO: 29.7 PG (ref 26–34)
MCHC RBC AUTO-ENTMCNC: 30 G/DL (ref 30–36.5)
MCV RBC AUTO: 98.9 FL (ref 80–99)
NRBC # BLD: 0 K/UL (ref 0–0.01)
NRBC BLD-RTO: 0 PER 100 WBC
PLATELET # BLD AUTO: 240 K/UL (ref 150–400)
PMV BLD AUTO: 10.8 FL (ref 8.9–12.9)
POTASSIUM SERPL-SCNC: 4.8 MMOL/L (ref 3.5–5.1)
PROT SERPL-MCNC: 7 G/DL (ref 6.4–8.2)
RBC # BLD AUTO: 4.45 M/UL (ref 3.8–5.2)
SODIUM SERPL-SCNC: 140 MMOL/L (ref 136–145)
TRIGL SERPL-MCNC: 137 MG/DL
TSH SERPL DL<=0.05 MIU/L-ACNC: 1.22 UIU/ML (ref 0.36–3.74)
VLDLC SERPL CALC-MCNC: 27.4 MG/DL
WBC # BLD AUTO: 7.9 K/UL (ref 3.6–11)

## 2023-06-06 NOTE — ASSESSMENT & PLAN NOTE
Diagnosed 2018. Controlled. Stable and maintained on regimen of Diltiazem and Eliquis . Followed q6 months to a year by cardiologist Dr. Nabil Rizo. Last visit < 1yr.

## 2023-07-16 RX ORDER — ESCITALOPRAM OXALATE 10 MG/1
TABLET ORAL
Qty: 90 TABLET | Refills: 3 | Status: SHIPPED | OUTPATIENT
Start: 2023-07-16

## 2023-08-13 RX ORDER — BUPROPION HYDROCHLORIDE 150 MG/1
TABLET ORAL
Qty: 90 TABLET | Refills: 2 | Status: SHIPPED | OUTPATIENT
Start: 2023-08-13

## 2023-09-28 ENCOUNTER — HOSPITAL ENCOUNTER (OUTPATIENT)
Facility: HOSPITAL | Age: 58
Setting detail: RECURRING SERIES
End: 2023-09-28
Payer: COMMERCIAL

## 2023-09-28 VITALS — WEIGHT: 293 LBS | HEIGHT: 68 IN | BODY MASS INDEX: 44.41 KG/M2

## 2023-09-28 PROCEDURE — 97161 PT EVAL LOW COMPLEX 20 MIN: CPT

## 2023-09-28 PROCEDURE — 97760 ORTHOTIC MGMT&TRAING 1ST ENC: CPT

## 2023-09-28 NOTE — THERAPY EVALUATION
[facility]  a part of [address]  Phone: [phone]    Fax: [phone]                                                                                PT/OT LYMPHEDEMA - EVALUATION/PLAN OF CARE NOTE (updated 3/23)      Date: 2023          Patient Name:  Mike Braswell :  1965   Medical   Diagnosis:  Lymphedema, not elsewhere classified [I89.0] Treatment Diagnosis:  I89.0     Lymphedema, not elsewhere classified    Referral Source:  Darra Barthel, PA-C Provider #:  [id number]                Insurance: Payor: Orville Lewis / Plan: Holley Hough / Product Type: *No Product type* /      Patient  verified yes     Visit #   Current  / Total 1 1   Time   In / Out 1345 1455   Total Treatment Time 70   Total Timed Codes 45         SUBJECTIVE  Pain Level (0-10 scale): 6 out of 10 numeric scale  []constant []intermittent []improving []worsening []no change since onset    Any medication changes, allergies to medications, adverse drug reactions, diagnosis change, or new procedure performed?: [x] No    [] Yes (see summary sheet for update)  Medications: Verified on Patient Summary List    Subjective functional status/changes:     I was going to the [place] and doing the [facility] Prep program until about 2 months ago, but I developed some bladder control issues which I am working on and hope to get back to because it helped with my swelling and my knees. Start of Care: 2023  Onset Date:   Current symptoms/Complaints: swelling and pain in lower extremities, leg tenderness,painful to touch, heaviness  Mechanism of Injury:   Patient returns to the clinic for reassessment and she has been wearing her custom compression knee highs daily. She reports she has not been wearing her micro massaging daniel due they make her pants slide down secondary to the material is smooth. She is performing skin care daily with Remedy lotion.

## 2023-09-28 NOTE — THERAPY EVALUATION
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 []   I87.2 Venous Insufficiency   []   I89.0 Lymphedema, other secondary  []   I83.019 Venous Stasis Ulcer LE, Right   []   I89.9 Unspecified Lymphatic Disorder []   I83.029 Venous Stasis Ulcer LE, Left   [x]   R60.9 Swelling not relieved by elevation []   Q82.0 Hereditary/ Congenital Lymphedema   []   C50.211 Malignant neoplasm of breast, Right []   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   []   C50.212 Malignant neoplasm of breast, Left []   L03.115 LE Cellulitis, Right   []    []   L03.116 LE Cellulitis, Left                                   Maribel Amos    1965  Page 2 of 2    Physician Order for DME for Diagnosis of lympehdema as Listed on Statement of Medical Necessity, Page 1         Recommended Product:  Units Upper Extremity Rt Lt Units Lower Extremity Rt Lt    Circ-Aid, Ready Wrap, Sigvaris Arm    Inelastic binders (Patrice Kovacs)  []Foot   []Below Knee   []Knee   []Thigh      Circ-Aid Ready Wrap, Sigvaris hand    Oswaldo Ang, night use []Full Leg  []Lower Leg      Tribute Arm, night use    Tribute, night use  []Full Leg  []Lower Leg      Oswaldo Ang Arm, night use    Boby Sleeve Leg/ Foot, night use      Gradiant Compression Sleeves  []Custom [] RM Arm:  []CCL1 []CCL2 []CCL3  []Custom [] RM:  []Glove  []Gauntlet:                         []CCL1 []CCL2 []CCL3     4 Gradient Compression Stockings   [x]Custom  []RM Lower Extremity:   []CCL1       [x]CCL2         []CCL3   [x]Knee       []Thigh        []Waist Length x x    Boby sleeve arm w/ hand, night use    Tribute Wrap, night use      Compression Bra     Waist length RM gradient compression stocking class 1       Compression Vest         The above patient was referred for treatment of

## 2023-10-13 ENCOUNTER — HOSPITAL ENCOUNTER (OUTPATIENT)
Age: 58
Discharge: HOME | End: 2023-10-13
Payer: COMMERCIAL

## 2023-10-13 VITALS — WEIGHT: 293 LBS | HEIGHT: 67 IN | BODY MASS INDEX: 45.99 KG/M2

## 2023-10-13 DIAGNOSIS — Z12.31 SCREENING MAMMOGRAM FOR BREAST CANCER: ICD-10-CM

## 2023-10-13 PROCEDURE — 77063 BREAST TOMOSYNTHESIS BI: CPT

## 2023-10-17 ENCOUNTER — TELEMEDICINE (OUTPATIENT)
Age: 58
End: 2023-10-17
Payer: COMMERCIAL

## 2023-10-17 DIAGNOSIS — J32.9 SINOBRONCHITIS: Primary | ICD-10-CM

## 2023-10-17 DIAGNOSIS — J40 SINOBRONCHITIS: Primary | ICD-10-CM

## 2023-10-17 PROCEDURE — 99213 OFFICE O/P EST LOW 20 MIN: CPT | Performed by: FAMILY MEDICINE

## 2023-10-17 RX ORDER — BENZONATATE 200 MG/1
200 CAPSULE ORAL 3 TIMES DAILY PRN
Qty: 21 CAPSULE | Refills: 0 | Status: SHIPPED | OUTPATIENT
Start: 2023-10-17 | End: 2023-10-24

## 2023-10-17 RX ORDER — AMOXICILLIN AND CLAVULANATE POTASSIUM 875; 125 MG/1; MG/1
1 TABLET, FILM COATED ORAL 2 TIMES DAILY
Qty: 14 TABLET | Refills: 0 | Status: SHIPPED | OUTPATIENT
Start: 2023-10-17 | End: 2023-10-24

## 2023-10-17 ASSESSMENT — ENCOUNTER SYMPTOMS
TROUBLE SWALLOWING: 0
GASTROINTESTINAL NEGATIVE: 1
WHEEZING: 0
SORE THROAT: 1
SHORTNESS OF BREATH: 0
COUGH: 1
SINUS PRESSURE: 1

## 2023-10-20 ENCOUNTER — TELEPHONE (OUTPATIENT)
Age: 58
End: 2023-10-20

## 2023-10-20 NOTE — TELEPHONE ENCOUNTER
Spoke with pt says she's been taking the amoxicillin for the 3 1/2 days and experiencing the cramps and diarrhea. Pt wants to know if there a medication she can take that can substitute the amoxicillin.  Best call back number [phone]

## 2023-10-20 NOTE — TELEPHONE ENCOUNTER
Spoke to pt, she said that her sinus problem is getting better. She took her first dose of the Augmentin on 10/17 she took 2 pills the 18th and 1 pill yesterday. She didn't take any today \"the Augmentin is tearing up her stomach\". Her diarrhea and cramping is getting better today. She is taking the cough med and using the nasal rinse and that has helped a lot. Advised that she can stop the Augmentin. I will let Dr Marek Cameron know and get back with her next week.

## 2023-10-23 NOTE — TELEPHONE ENCOUNTER
I called pt to see how she is doing she said that her stomach is better since stopping the augmentin. Her sinuses are better she is using the sinus wash, still with a little PND no nasal congestion, but now her ears feel full. Advised that she may need to be seen.

## 2023-10-24 NOTE — TELEPHONE ENCOUNTER
Use sudafed bid x 5 days, Nasacort AQ 2 sprays per nostril once daily x 2-3 weeks. OV or urgent care if symptoms do not continue to improve, sooner if anything worsens in the interim.

## 2023-10-24 NOTE — TELEPHONE ENCOUNTER
Called pt and set over Dr Simón Brannon recommendations below:    Use sudafed bid x 5 days, Nasacort AQ 2 sprays per nostril once daily x 2-3 weeks. OV or urgent care if symptoms do not continue to improve, sooner if anything worsens in the interim.      Pt agreeable with plan

## 2023-11-13 ENCOUNTER — HOSPITAL ENCOUNTER (OUTPATIENT)
Facility: HOSPITAL | Age: 58
Setting detail: RECURRING SERIES
Discharge: HOME | End: 2023-11-16
Payer: COMMERCIAL

## 2023-11-13 VITALS — HEIGHT: 67 IN | BODY MASS INDEX: 45.99 KG/M2 | WEIGHT: 293 LBS

## 2023-11-13 PROCEDURE — 97760 ORTHOTIC MGMT&TRAING 1ST ENC: CPT

## 2023-11-13 NOTE — PROGRESS NOTES
PHYSICAL THERAPY/OCCUPATIONAL THERAPY - DAILY TREATMENT NOTE (updated 3/23)      Date: 2023          Patient Name:  Bhupinder Segovia :  1965   Medical   Diagnosis:  Lymphedema, not elsewhere classified [I89.0] Treatment Diagnosis:  I89.0     Lymphedema, not elsewhere classified    Referral Source:  Mateo Johnston PA-C Insurance:   Payor: Pa Breaux / Plan: Damian Hernandez / Product Type: *No Product type* /                     Patient  verified yes     Visit #   Current  / Total 2 4   Time   In / Out 0805 0937   Total Treatment Time 32   Total Timed Codes 32         SUBJECTIVE    Pain Level (0-10 scale): 4 out of 10 numeric scale feet,legs and knees    Any medication changes, allergies to medications, adverse drug reactions, diagnosis change, or new procedure performed?: [x] No    [] Yes (see summary sheet for update)  Medications: Verified on Patient Summary List    Subjective functional status/changes:      My new stockings seem to  be a little too loose on the upper leg and the ankle area. I got them about 2 weeks ago and wore them once and washed them. OBJECTIVE      Therapeutic Procedures: Tx Min Billable or 1:1 Min (if diff from Tx Min) Procedure, Rationale, Specifics   32 [phone] [id number] Orthotic Management and Training LE (timed): improve positioning of lower extremity during weight bearing and gait, improve pressure distrubution of the plantar aspect of the foot to improve patient's ability to progress to PLOF and address remaining functional goals. Details if applicable:    Fitted for the following compression products:     UE:  Bilateral Knee high, Top band silicone border, and Closed Toe     Style: Flat knit     Brand: Jobst     Type: Custom Jobst confidence compression class 2 with closed toes and wide soft fit silicone band      Vendor: [employer]      Education: Daily wear schedule., Daily laundering., Garment lifespan., and Return and reordering process (by bringing prior garments

## 2023-11-13 NOTE — PROGRESS NOTES
[facility]  a part of [address],  [address]  [place], [address]  Phone: [phone]  Fax: [phone]    PHYSICAL THERAPY/OCCUPATIONAL THERAPY PROGRESS NOTE  Patient Name:  Misha Abarca :  1965   Treatment/Medical Diagnosis: Lymphedema, not elsewhere classified [I89.0]   Referral Source:  Neto Magana PA-C     Date of Initial Visit:  2023 Attended Visits:  2 Missed Visits:  0     SUMMARY OF TREATMENT/ASSESSMENT:  Patient returns for compression garment fitting. Garments were delivered 2 weeks ago. Assessment of fitting yields the garments are loose around the Y location and in proximal garment affecting garment suspension and swelling control. Reassessed circumferences for garment order with decreases noted in size at most measurements and the significant decreases present at c and d locations. Patient has lost 9 pounds since she was last seen in therapy which will also affect fit. A remake request with the new measurements has been made and patient is to call to report on fit upon delivery. If fit issues persist she will return to the clinic for a follow-up fitting. If no fit issues are noted, therapist will reorder her garments and she will be discharged to the home restorative phase of treatment with a re-evaluation in May of 2024. Patient will continue to benefit from skilled PT / OT services to address swelling, analyze compression product fit and use, and measure for compression products to address functional deficits and attain remaining goals. CURRENT STATUS  Short Term Goals: To be accomplished in 2  treatments    1. Patient will be measured for and obtain comfortable and optimal fitting compression products to prevent reaccumulation of fluid at discharge which could impair patient's ability to perform safe mobility and dressing.   Status at last Eval/Progress Note: initiated   Current Status: not met/in progress  Goal

## 2023-12-11 DIAGNOSIS — J32.9 SINOBRONCHITIS: ICD-10-CM

## 2023-12-11 DIAGNOSIS — J40 SINOBRONCHITIS: ICD-10-CM

## 2023-12-11 RX ORDER — BENZONATATE 200 MG/1
200 CAPSULE ORAL 3 TIMES DAILY PRN
Qty: 21 CAPSULE | Refills: 0 | OUTPATIENT
Start: 2023-12-11

## 2024-01-05 ENCOUNTER — TELEPHONE (OUTPATIENT)
Age: 59
End: 2024-01-05

## 2024-01-05 ENCOUNTER — NURSE TRIAGE (OUTPATIENT)
Dept: OTHER | Facility: CLINIC | Age: 59
End: 2024-01-05

## 2024-01-05 NOTE — TELEPHONE ENCOUNTER
Location of patient: [place]    Received call from Jovita at [facility] with Red Flag Complaint.    Subjective: Caller states \"vertigo\"     Current Symptoms: dizziness, balance was off, spinning sensation. No nausea or syncopal. Began yesterday.    Headache the 3 days prior.    B/P yesterday 152/80 , current 140/81    BS yesterday 76    Onset: see each c/o ; improving    Associated Symptoms: reduced activity    Pain Severity: 0/10; N/A; none    Temperature: none     What has been tried: claritin    LMP: NA Pregnant: NA    Recommended disposition: Go to ED/UCC Now (Or to Office with PCP Approval)    0822- Called [facility] office. Phone ringing.  0823- Spoke with Nicollette.  0824- Warm transferred caller to her.        Care advice provided, patient verbalizes understanding; denies any other questions or concerns; instructed to call back for any new or worsening symptoms.    Writer provided warm transfer to Nicollete at [facility] for second level triage.    Attention Provider:  Thank you for allowing me to participate in the care of your patient.  The patient was connected to triage in response to information provided to the [facility].  Please do not respond through this encounter as the response is not directed to a shared pool.      Reason for Disposition   Spinning or tilting sensation (vertigo) present now and one or more stroke risk factors (i.e., hypertension, diabetes mellitus, prior stroke/TIA, heart attack, age over 60) (Exception: Prior physician evaluation for this AND no different/worse than usual.)    Protocols used: Dizziness-ADULT-OH

## 2024-01-05 NOTE — TELEPHONE ENCOUNTER
Spoke to pt.  She said that she is feeling better.  No headache.  She had one for 3 days.  It wasn't bad enough to take anything for it.  She just knew it was there.  Her BP today was 140/81.  She is able to get her BP done at work.  She will have it checked once a week for a couple of weeks.  She asked about pre DM and when should she have that rechecked.  I went over results with her and explained about watching what she eats and drinks.  Avouid sweets except for special occasions.

## 2024-01-05 NOTE — TELEPHONE ENCOUNTER
Avril triage nurse called states pt has vertigo, room spinning started yesterday. Her BP yesterday was 152/80 and today 140/81. Headache 3 days prior to symptoms but none today. Nurse Daya notified.     [facility]# [phone]

## 2024-03-11 ENCOUNTER — TELEPHONE (OUTPATIENT)
Age: 59
End: 2024-03-11

## 2024-03-11 RX ORDER — ATORVASTATIN CALCIUM 20 MG/1
20 TABLET, FILM COATED ORAL DAILY
Qty: 90 TABLET | Refills: 0 | OUTPATIENT
Start: 2024-03-11

## 2024-03-12 NOTE — TELEPHONE ENCOUNTER
Last physical and fasting labs 6-2023.  Due next check 6-2024. Nothing scheduled.   But it also doesn't look like we have prescribed anything for patient for cholesterol since 2018.   Current cholesterol medication Lipitor is listed as a historical medication from 2019.  So who is prescribing this?

## 2024-03-13 NOTE — TELEPHONE ENCOUNTER
Called pt to let her know about the refill request.  She said that she didn't recognize the name of the MD listed on the pill bottle for the lipitor.  She didn't know if that was another MD in one of the doctor's office that she goes to.  She will call pharmacy to find out.  She couldn't remember who prescribed it.  She reports that she sees cardio Dr Pierre.  She saw him not long ago.  She had c/o of her legs hurting and he told her to stop the lipitor to see if the pain went away.  She did stop it and the pain went away.  She didn't f/u with him.    She recently went to gyn @ [facility] and they were going to start her on zepound for her wgt and they checked her labs.  She said that her triglycerides were high so she knew she had to get back on the lipitor and that is why we got the refill request.  She assumed it was Dr Barlow that rx'd it.  I recommended that she call Dr Pierre to see what he recommends.  I did tell her about the CoQ10 and sometimes that helps but she should check with Dr Pierre.  I also recommended that she have gyn send over a copy of the labs to Dr Pierre as well.  Pt did say that she wasn't fasting when she had the labs done.  I went ahead and scheduled her for a CPE for 6/13 @ 8:20.  She will let us know if she needs anything from us, after she checks with Dr Pierre.

## 2024-05-05 RX ORDER — BUPROPION HYDROCHLORIDE 150 MG/1
TABLET ORAL
Qty: 90 TABLET | Refills: 0 | Status: SHIPPED | OUTPATIENT
Start: 2024-05-05

## 2024-05-07 ENCOUNTER — HOSPITAL ENCOUNTER (OUTPATIENT)
Facility: HOSPITAL | Age: 59
Setting detail: RECURRING SERIES
Discharge: HOME | End: 2024-05-10
Payer: COMMERCIAL

## 2024-05-07 VITALS — BODY MASS INDEX: 42.59 KG/M2 | HEIGHT: 68 IN | WEIGHT: 281 LBS

## 2024-05-07 PROCEDURE — 97760 ORTHOTIC MGMT&TRAING 1ST ENC: CPT

## 2024-05-07 PROCEDURE — 97162 PT EVAL MOD COMPLEX 30 MIN: CPT

## 2024-05-07 NOTE — THERAPY EVALUATION
Statement of Medical Necessity  Page 1 of 2      Meli Ontiveros 1965 Today's Date: 5/7/2024 MONET: [employer]   Payor: KUSH / Plan: [facility] [place] / Product Type: *No Product type* /  ME: TBD  Refills: 2                   Diagnosis  []   I97.2 Post-Mastectomy Lymphedema []   I87.2 Venous Insufficiency   [x]   I89.0 Lymphedema, other secondary  []   I83.019 Venous Stasis Ulcer LE, Right   []   I89.9 Unspecified Lymphatic Disorder []   I83.029 Venous Stasis Ulcer LE, Left   []   R60.9 Swelling not relieved by elevation []   Q82.0 Hereditary/ Congenital Lymphedema   []   C50.211 Malignant neoplasm of breast, Right []   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   []   C50.212 Malignant neoplasm of breast, Left []   L03.115 LE Cellulitis, Right   []    []   L03.116 LE Cellulitis, Left                                   Meli Ontiveros    1965  Page 2 of 2    Physician Order for DME for Diagnosis of bilateral lower extremity lymphedema as Listed on Statement of Medical Necessity, Page 1         Recommended Product:  Units Upper Extremity Rt Lt Units Lower Extremity Rt Lt    Circ-Aid, Ready Wrap, Sigvaris Arm    Inelastic binders (Circ-Aid, Farrow)  []Foot   []Below Knee   []Knee   []Thigh      Circ-Aid Ready Wrap, Sigvaris hand    Oswaldo Ang, night use []Full Leg  []Lower Leg      Tribute Arm, night use    Tribute, night use  []Full Leg  []Lower Leg      Oswaldo Ang Arm, night use    Boby Sleeve Leg/ Foot, night use      Gradiant Compression Sleeves  []Custom [] RM Arm:  []CCL1 []CCL2 []CCL3  []Custom [] RM:  []Glove  []Gauntlet:                         []CCL1 []CCL2 []CCL3     4 Gradient Compression Stockings   [x]Custom  []RM Lower Extremity:   []CCL1       [x]CCL2         []CCL3   [x]Knee       []Thigh        []Waist Length x x    Boby sleeve arm w/ hand, night use    Tribute Wrap, night use      Compression Bra   2 RM waist length compression daniel/legging CCL1 x x    Compression Vest         The above patient was referred for 
options (Circaid daniel and Rejuva legging for knee and thigh compression).  Addressed patient questions about the products and patient requests to try the Rejuva legging.  Size chart does not include waist or hip sizing but patient does fit into extra large for the legs.  Advised patient to ask [facility] when she talks to them about return policy for Rejuva garments if they do not fit as not all waist length garments are returnable and there is no larger size to exchange for patient. She verbalized understanding.    Patient requires custom flat-knit right and left lower extremity compression garments at this time for optimal lymphedema control and garment fit.  Patient's limb size is disproportionate with proximal limb being disproportionate in size to distal limb.    A poorly fitting compression garment will not provide adequate compression for long term swelling control and risks causing a tourniquet or skin breakdown.   Flat-knit material is needed for swelling containment and to prevent progression of swelling during wear so patient does not swell out of the garment.  Circular knit compression is inadequate to contain patient's swelling.  Silicone band is needed for garment suspension to prevent garment from sliding down the leg and causing skin irritation and increased pressure at gathering point which would lead to skin breakdown or irritation. Compression stockings need to be closed toed to prevent pushing of swelling into the toe region.     Patient requires 2 custom right and 2 custom left compression knee highs for washing and wearing and to allow her to maintain the recommended wearing schedule to optimally manage the lymphedema. Patient requires 2 non custom compression daniel/leggings to complete compression coverage on the lower body by providing compression to the knees, thighs and hips.    Patient/family demonstrated donning and doffing with independence  With current garments.    Patient is to

## 2024-06-13 ENCOUNTER — OFFICE VISIT (OUTPATIENT)
Age: 59
End: 2024-06-13
Payer: COMMERCIAL

## 2024-06-13 VITALS
DIASTOLIC BLOOD PRESSURE: 74 MMHG | SYSTOLIC BLOOD PRESSURE: 122 MMHG | HEIGHT: 67 IN | WEIGHT: 274.6 LBS | HEART RATE: 69 BPM | TEMPERATURE: 98 F | OXYGEN SATURATION: 98 % | RESPIRATION RATE: 16 BRPM | BODY MASS INDEX: 43.1 KG/M2

## 2024-06-13 DIAGNOSIS — E78.2 MIXED HYPERLIPIDEMIA: ICD-10-CM

## 2024-06-13 DIAGNOSIS — E55.9 VITAMIN D DEFICIENCY: ICD-10-CM

## 2024-06-13 DIAGNOSIS — I48.0 PAF (PAROXYSMAL ATRIAL FIBRILLATION) (HCC): ICD-10-CM

## 2024-06-13 DIAGNOSIS — Z00.00 ANNUAL PHYSICAL EXAM: Primary | ICD-10-CM

## 2024-06-13 DIAGNOSIS — Z00.00 ANNUAL PHYSICAL EXAM: ICD-10-CM

## 2024-06-13 DIAGNOSIS — R73.03 PREDIABETES: ICD-10-CM

## 2024-06-13 PROCEDURE — 99396 PREV VISIT EST AGE 40-64: CPT | Performed by: FAMILY MEDICINE

## 2024-06-13 RX ORDER — MELATONIN
1000 DAILY
COMMUNITY

## 2024-06-13 RX ORDER — TIRZEPATIDE 2.5 MG/.5ML
INJECTION, SOLUTION SUBCUTANEOUS
COMMUNITY

## 2024-06-13 SDOH — ECONOMIC STABILITY: FOOD INSECURITY: WITHIN THE PAST 12 MONTHS, YOU WORRIED THAT YOUR FOOD WOULD RUN OUT BEFORE YOU GOT MONEY TO BUY MORE.: NEVER TRUE

## 2024-06-13 SDOH — ECONOMIC STABILITY: FOOD INSECURITY: WITHIN THE PAST 12 MONTHS, THE FOOD YOU BOUGHT JUST DIDN'T LAST AND YOU DIDN'T HAVE MONEY TO GET MORE.: NEVER TRUE

## 2024-06-13 SDOH — ECONOMIC STABILITY: INCOME INSECURITY: HOW HARD IS IT FOR YOU TO PAY FOR THE VERY BASICS LIKE FOOD, HOUSING, MEDICAL CARE, AND HEATING?: NOT HARD AT ALL

## 2024-06-13 ASSESSMENT — PATIENT HEALTH QUESTIONNAIRE - PHQ9
SUM OF ALL RESPONSES TO PHQ QUESTIONS 1-9: 0
6. FEELING BAD ABOUT YOURSELF - OR THAT YOU ARE A FAILURE OR HAVE LET YOURSELF OR YOUR FAMILY DOWN: NOT AT ALL
SUM OF ALL RESPONSES TO PHQ9 QUESTIONS 1 & 2: 0
4. FEELING TIRED OR HAVING LITTLE ENERGY: NOT AT ALL
2. FEELING DOWN, DEPRESSED OR HOPELESS: NOT AT ALL
5. POOR APPETITE OR OVEREATING: NOT AT ALL
1. LITTLE INTEREST OR PLEASURE IN DOING THINGS: NOT AT ALL
3. TROUBLE FALLING OR STAYING ASLEEP: NOT AT ALL
7. TROUBLE CONCENTRATING ON THINGS, SUCH AS READING THE NEWSPAPER OR WATCHING TELEVISION: NOT AT ALL
SUM OF ALL RESPONSES TO PHQ QUESTIONS 1-9: 0
SUM OF ALL RESPONSES TO PHQ QUESTIONS 1-9: 0
8. MOVING OR SPEAKING SO SLOWLY THAT OTHER PEOPLE COULD HAVE NOTICED. OR THE OPPOSITE, BEING SO FIGETY OR RESTLESS THAT YOU HAVE BEEN MOVING AROUND A LOT MORE THAN USUAL: NOT AT ALL
10. IF YOU CHECKED OFF ANY PROBLEMS, HOW DIFFICULT HAVE THESE PROBLEMS MADE IT FOR YOU TO DO YOUR WORK, TAKE CARE OF THINGS AT HOME, OR GET ALONG WITH OTHER PEOPLE: NOT DIFFICULT AT ALL
9. THOUGHTS THAT YOU WOULD BE BETTER OFF DEAD, OR OF HURTING YOURSELF: NOT AT ALL
SUM OF ALL RESPONSES TO PHQ QUESTIONS 1-9: 0

## 2024-06-13 ASSESSMENT — ENCOUNTER SYMPTOMS
RESPIRATORY NEGATIVE: 1
EYES NEGATIVE: 1
GASTROINTESTINAL NEGATIVE: 1
ALLERGIC/IMMUNOLOGIC NEGATIVE: 1

## 2024-06-13 NOTE — PROGRESS NOTES
Chief Complaint   Patient presents with    Annual Exam     Fasting    Cholesterol Problem    Prediabetes     1. \"Have you been to the ER, urgent care clinic since your last visit?  Hospitalized since your last visit?\" No    2. \"Have you seen or consulted any other health care providers outside of the [facility] since your last visit?\"  Ortho Dr Junior, podiatrist Dr Reeder    3. For patients aged 45-75: Has the patient had a colonoscopy / FIT/ Cologuard? No, has refused colonoscopies and her insurance wouldn't cover cologuard; will consider scheduling colonoscopy later in the year but needs to be off Zepbound x 1 month before she can have it done      If the patient is female:    4. For patients aged 40-74: Has the patient had a mammogram within the past 2 years? Yes - no Care Gap present 10/2023      5. For patients aged 21-65: Has the patient had a pap smear? Yes - no Care Gap present 2/2020    She will schedule a well woman appt for 2/2025    
General: There is no distension.      Palpations: Abdomen is soft.      Tenderness: There is no abdominal tenderness.   Musculoskeletal:         General: No tenderness.      Cervical back: Neck supple.      Comments: Moderate BLE edema. Compression socks in place   Lymphadenopathy:      Cervical: No cervical adenopathy.   Skin:     General: Skin is warm and dry.   Neurological:      General: No focal deficit present.      Mental Status: She is alert and oriented to person, place, and time. Mental status is at baseline.   Psychiatric:         Mood and Affect: Mood normal.              ASSESSMENT & PLAN     1. Annual physical exam  -     CBC; Future  -     Comprehensive Metabolic Panel; Future  -     Hemoglobin A1C; Future  -     Lipid Panel; Future  -     TSH; Future  -     Vitamin D 25 Hydroxy; Future    2. Mixed hyperlipidemia  -     Lipid Panel; Future    3. Prediabetes  -     Hemoglobin A1C; Future    4. BMI 40.0-44.9, adult (formerly [facility])  Being followed at weight mgt program [facility]  Started on Zepbound 2.5 mg 1/2024, starts 5 mg dose next week  Successful 30 lb wt loss thus far  Joining [facility] for water exercise classes  Doing Weight Watchers    5. PAF (paroxysmal atrial fibrillation) (formerly [facility])  Monitored and managed by cardiology  Maintained on regimen of Diltiazem & Eliquis  Controlled, stable    6. Vitamin D deficiency  -     Vitamin D 25 Hydroxy; Future    Fasting labs checked today  Reviewed diet, nutrition, exercise, weight management/goals, risk reduction, cardiovascular goals for age and associated health risks, medications, effects, risks, benefits, potential interactions and possible side effects.   Age/risk based screening recommendations, health maintenance & prevention counseling.   Cancer screening USPTFS guidelines reviewed w/ pt today.   Discussed benefits/positive/negative outcomes of screening based on age/risk stratification. Informed consent for/against screening based on pt's

## 2024-06-14 LAB
25(OH)D3 SERPL-MCNC: 45.9 NG/ML (ref 30–100)
ALBUMIN SERPL-MCNC: 3.9 G/DL (ref 3.5–5)
ALBUMIN/GLOB SERPL: 1.3 (ref 1.1–2.2)
ALP SERPL-CCNC: 83 U/L (ref 45–117)
ALT SERPL-CCNC: 22 U/L (ref 12–78)
ANION GAP SERPL CALC-SCNC: 8 MMOL/L (ref 5–15)
AST SERPL-CCNC: 14 U/L (ref 15–37)
BILIRUB SERPL-MCNC: 0.5 MG/DL (ref 0.2–1)
BUN SERPL-MCNC: 11 MG/DL (ref 6–20)
BUN/CREAT SERPL: 13 (ref 12–20)
CALCIUM SERPL-MCNC: 9.1 MG/DL (ref 8.5–10.1)
CHLORIDE SERPL-SCNC: 108 MMOL/L (ref 97–108)
CHOLEST SERPL-MCNC: 135 MG/DL
CO2 SERPL-SCNC: 27 MMOL/L (ref 21–32)
CREAT SERPL-MCNC: 0.83 MG/DL (ref 0.55–1.02)
ERYTHROCYTE [DISTWIDTH] IN BLOOD BY AUTOMATED COUNT: 13.4 % (ref 11.5–14.5)
EST. AVERAGE GLUCOSE BLD GHB EST-MCNC: 103 MG/DL
GLOBULIN SER CALC-MCNC: 3.1 G/DL (ref 2–4)
GLUCOSE SERPL-MCNC: 92 MG/DL (ref 65–100)
HBA1C MFR BLD: 5.2 % (ref 4–5.6)
HCT VFR BLD AUTO: 40.5 % (ref 35–47)
HDLC SERPL-MCNC: 60 MG/DL
HDLC SERPL: 2.3 (ref 0–5)
HGB BLD-MCNC: 13.1 G/DL (ref 11.5–16)
LDLC SERPL CALC-MCNC: 58 MG/DL (ref 0–100)
MCH RBC QN AUTO: 30.8 PG (ref 26–34)
MCHC RBC AUTO-ENTMCNC: 32.3 G/DL (ref 30–36.5)
MCV RBC AUTO: 95.1 FL (ref 80–99)
NRBC # BLD: 0 K/UL (ref 0–0.01)
NRBC BLD-RTO: 0 PER 100 WBC
PLATELET # BLD AUTO: 215 K/UL (ref 150–400)
PMV BLD AUTO: 10.5 FL (ref 8.9–12.9)
POTASSIUM SERPL-SCNC: 4.4 MMOL/L (ref 3.5–5.1)
PROT SERPL-MCNC: 7 G/DL (ref 6.4–8.2)
RBC # BLD AUTO: 4.26 M/UL (ref 3.8–5.2)
SODIUM SERPL-SCNC: 143 MMOL/L (ref 136–145)
TRIGL SERPL-MCNC: 85 MG/DL
TSH SERPL DL<=0.05 MIU/L-ACNC: 0.89 UIU/ML (ref 0.36–3.74)
VLDLC SERPL CALC-MCNC: 17 MG/DL
WBC # BLD AUTO: 4.7 K/UL (ref 3.6–11)

## 2024-07-02 NOTE — TELEPHONE ENCOUNTER
PCP: Rut Salmon MD    LOV: 6.13.24    Future Appointments   Date Time Provider Department Center   11/12/2024  9:00 AM Lakeisha Liz, PT [facility]       Requested Prescriptions     Pending Prescriptions Disp Refills    escitalopram (LEXAPRO) 10 MG tablet [Pharmacy Med Name: Escitalopram Oxalate 10 MG Oral Tablet] 90 tablet 0     Sig: TAKE 1 TABLET BY MOUTH ONCE DAILY IN THE EVENING       Prior labs and Blood pressures:  BP Readings from Last 3 Encounters:   06/13/24 122/74   06/05/23 136/74   01/24/23 119/75     Lab Results   Component Value Date/Time     06/13/2024 09:17 AM    K 4.4 06/13/2024 09:17 AM     06/13/2024 09:17 AM    CO2 27 06/13/2024 09:17 AM    BUN 11 06/13/2024 09:17 AM    GFRAA >60 06/01/2022 12:12 PM     No results found for: \"HBA1C\", \"[id number]\"  Lab Results   Component Value Date/Time    CHOL 135 06/13/2024 09:17 AM    HDL 60 06/13/2024 09:17 AM    LDL 58 06/13/2024 09:17 AM    LDL 86.6 06/05/2023 10:24 AM    VLDL 17 06/13/2024 09:17 AM     No results found for: \"VITD3\"    Lab Results   Component Value Date/Time    TSH 1.40 06/01/2022 12:12 PM

## 2024-07-03 RX ORDER — ESCITALOPRAM OXALATE 10 MG/1
TABLET ORAL
Qty: 90 TABLET | Refills: 3 | Status: SHIPPED | OUTPATIENT
Start: 2024-07-03

## 2024-08-18 RX ORDER — BUPROPION HYDROCHLORIDE 150 MG/1
TABLET ORAL
Qty: 90 TABLET | Refills: 2 | Status: SHIPPED | OUTPATIENT
Start: 2024-08-18

## 2024-10-28 ENCOUNTER — TRANSCRIBE ORDERS (OUTPATIENT)
Facility: HOSPITAL | Age: 59
End: 2024-10-28

## 2024-10-28 DIAGNOSIS — Z12.31 VISIT FOR SCREENING MAMMOGRAM: Primary | ICD-10-CM

## 2024-11-11 ENCOUNTER — TRANSCRIBE ORDERS (OUTPATIENT)
Facility: HOSPITAL | Age: 59
End: 2024-11-11

## 2024-11-11 DIAGNOSIS — I89.0 LYMPHEDEMA: Primary | ICD-10-CM

## 2024-11-12 ENCOUNTER — HOSPITAL ENCOUNTER (OUTPATIENT)
Facility: HOSPITAL | Age: 59
Setting detail: RECURRING SERIES
Discharge: HOME | End: 2024-11-15
Payer: COMMERCIAL

## 2024-11-12 VITALS — WEIGHT: 228.8 LBS | BODY MASS INDEX: 34.68 KG/M2 | HEIGHT: 68 IN

## 2024-11-12 PROCEDURE — 97760 ORTHOTIC MGMT&TRAING 1ST ENC: CPT

## 2024-11-12 PROCEDURE — 97161 PT EVAL LOW COMPLEX 20 MIN: CPT

## 2024-11-12 PROCEDURE — 97535 SELF CARE MNGMENT TRAINING: CPT

## 2024-11-12 PROCEDURE — 97140 MANUAL THERAPY 1/> REGIONS: CPT

## 2024-11-12 NOTE — THERAPY EVALUATION
containment and to prevent progression of swelling during wear so patient does not swell out of the garment.  Circular knit compression is inadequate to contain patient's swelling.  Silicone band is needed for garment suspension to prevent garment from sliding down the leg and causing skin irritation and increased pressure at gathering point which would lead to skin breakdown or irritation. Compression stockings need to be closed toed to prevent pushing of swelling into the toe region. Patient requires t-heel to prevent garment binding and gathering at the ankle which can cause skin breakdown.     Patient requires 2 custom right and 2 custom left compression knee highs for washing and wearing and to allow her to maintain the recommended wearing schedule to optimally manage the lymphedema.      Patient/family demonstrated donning and doffing with independence  with current garments.    Patient is to call upon garment delivery to report on fit and to determine if a follow-up fitting is needed.  If no fit issues are noted therapist will reorder her garments in the color preference of black and patient will be discharged and she will return to the clinic for a re-evaluation in 6 months.    60 60    Total Total       Patient Education: [x] Review HEP/home lymphedema management program    [x]  Patient Education billed concurrently with other procedures   [] MLD Patient Education   [] Progressed/Changed HEP based on:   [] positioning   [] Kinesiotape   [] Skin care   [] wound care   [x] other: compression garment ordering process and options, 30 day remake time frame,   [x] Jackie options at this time  Patient / caregiver re-demonstrated bandaging. [] Yes  [] No  Compression bandaging/garment precautions reviewed: [] Yes  [] No      Pain Level at end of session (0-10 scale): 5 out of 10 numeric scale      Plan of Care / Statement of Necessity for Lymphedema Therapy Services     Assessment / key information:     Meli DAUGHERTY

## 2024-11-25 ENCOUNTER — HOSPITAL ENCOUNTER (OUTPATIENT)
Age: 59
Discharge: HOME | End: 2024-11-28
Payer: COMMERCIAL

## 2024-11-25 VITALS — WEIGHT: 225 LBS | BODY MASS INDEX: 35.31 KG/M2 | HEIGHT: 67 IN

## 2024-11-25 DIAGNOSIS — Z12.31 VISIT FOR SCREENING MAMMOGRAM: ICD-10-CM

## 2024-11-25 PROCEDURE — 77063 BREAST TOMOSYNTHESIS BI: CPT

## 2025-04-28 ENCOUNTER — HOSPITAL ENCOUNTER (OUTPATIENT)
Facility: HOSPITAL | Age: 60
Setting detail: RECURRING SERIES
Discharge: HOME | End: 2025-05-01
Payer: COMMERCIAL

## 2025-04-28 VITALS — BODY MASS INDEX: 30.77 KG/M2 | WEIGHT: 203 LBS | HEIGHT: 68 IN

## 2025-04-28 PROCEDURE — 97535 SELF CARE MNGMENT TRAINING: CPT

## 2025-04-28 PROCEDURE — 97162 PT EVAL MOD COMPLEX 30 MIN: CPT

## 2025-04-28 PROCEDURE — 97760 ORTHOTIC MGMT&TRAING 1ST ENC: CPT

## 2025-04-28 NOTE — THERAPY EVALUATION
[facility]  a part of [facility]   [address], [address]  [place] [id number]  Phone: [phone]    Fax: [phone]                                                                                PT/OT LYMPHEDEMA - EVALUATION/PLAN OF CARE NOTE (updated 3/23)      Date: 2025          Patient Name:  Meli Ontiveros :  1965   Medical   Diagnosis:  Lymphedema [I89.0] Treatment Diagnosis:  I89.0     Lymphedema, not elsewhere classified    Referral Source:  Michel Lorenz PA Provider #:  [id number]                Insurance: Payor: KUSH / Plan: [facility] [facility] / Product Type: *No Product type* /      Patient  verified yes     Visit #   Current  / Total 1 1   Time   In / Out   1150 1332   Total Treatment Time 102   Total Timed Codes  80         SUBJECTIVE  Pain Level (0-10 scale): 7.5 out of 10 numeric scale, both legs, ankles, knees, aching with arthritis, pain is 10 out of 10 at worst in left knee at times.  []constant []intermittent []improving []worsening []no change since onset    Any medication changes, allergies to medications, adverse drug reactions, diagnosis change, or new procedure performed?: [] No    [x] Yes (see summary sheet for update)  Medications: Verified on Patient Summary List  Cardiac ablation in December for a-fib    Subjective functional status/changes:      Patient reports she is in need of a new vasopneumatic device and she is planning to have a TKR on left knee 2025.  She is also requesting to proceed with ordering compression to cover her thighs.  She loved the fit on her last compression garments.    Start of Care: 2025  Onset Date: 2020   Current symptoms/Complaints: swelling and pain in lower extremities, leg tenderness,painful to touch, heaviness, arthritis related pain  Mechanism of Injury:   Patient returns to the clinic for reassessment and she has been wearing her custom compression knee highs daily.  She is

## 2025-05-12 ENCOUNTER — APPOINTMENT (OUTPATIENT)
Facility: HOSPITAL | Age: 60
End: 2025-05-12
Payer: COMMERCIAL

## 2025-05-14 NOTE — TELEPHONE ENCOUNTER
PCP: Rut Salmon MD    Last appt: 6/13/2024     No future appointments.    Requested Prescriptions     Pending Prescriptions Disp Refills    buPROPion (WELLBUTRIN XL) 150 MG extended release tablet [Pharmacy Med Name: buPROPion HCl ER (XL) 150 MG Oral Tablet Extended Release 24 Hour] 90 tablet 0     Sig: TAKE 1 TABLET BY MOUTH ONCE DAILY IN THE MORNING       Prior labs and Blood pressures:  BP Readings from Last 3 Encounters:   06/13/24 122/74   06/05/23 136/74   01/24/23 119/75     Lab Results   Component Value Date/Time     06/13/2024 09:17 AM    K 4.4 06/13/2024 09:17 AM     06/13/2024 09:17 AM    CO2 27 06/13/2024 09:17 AM    BUN 11 06/13/2024 09:17 AM    GFRAA >60 06/01/2022 12:12 PM     No results found for: \"HBA1C\", \"[id number]\"  Lab Results   Component Value Date/Time    CHOL 135 06/13/2024 09:17 AM    HDL 60 06/13/2024 09:17 AM    LDL 58 06/13/2024 09:17 AM    LDL 86.6 06/05/2023 10:24 AM    VLDL 17 06/13/2024 09:17 AM     No results found for: \"VITD3\"    Lab Results   Component Value Date/Time    TSH 0.89 06/13/2024 09:17 AM

## 2025-05-15 RX ORDER — BUPROPION HYDROCHLORIDE 150 MG/1
150 TABLET ORAL EVERY MORNING
Qty: 30 TABLET | Refills: 0 | Status: SHIPPED | OUTPATIENT
Start: 2025-05-14 | End: 2025-06-10

## 2025-06-10 RX ORDER — BUPROPION HYDROCHLORIDE 150 MG/1
150 TABLET ORAL EVERY MORNING
Qty: 30 TABLET | Refills: 0 | Status: SHIPPED | OUTPATIENT
Start: 2025-06-10

## 2025-06-10 NOTE — TELEPHONE ENCOUNTER
PCP: Rut Salmon MD    Last appt: 6/13/2024     Future Appointments   Date Time Provider Department Center   6/24/2025  8:40 AM Rut Salmon MD [facility]   7/14/2025  8:40 AM Rut Salmon MD [facility]       Requested Prescriptions     Pending Prescriptions Disp Refills    buPROPion (WELLBUTRIN XL) 150 MG extended release tablet [Pharmacy Med Name: buPROPion HCl ER (XL) 150 MG Oral Tablet Extended Release 24 Hour] 30 tablet 0     Sig: TAKE 1 TABLET BY MOUTH ONCE DAILY IN THE MORNING       Prior labs and Blood pressures:  BP Readings from Last 3 Encounters:   06/13/24 122/74   06/05/23 136/74   01/24/23 119/75     Lab Results   Component Value Date/Time     06/13/2024 09:17 AM    K 4.4 06/13/2024 09:17 AM     06/13/2024 09:17 AM    CO2 27 06/13/2024 09:17 AM    BUN 11 06/13/2024 09:17 AM    GFRAA >60 06/01/2022 12:12 PM     No results found for: \"HBA1C\", \"[id number]\"  Lab Results   Component Value Date/Time    CHOL 135 06/13/2024 09:17 AM    HDL 60 06/13/2024 09:17 AM    LDL 58 06/13/2024 09:17 AM    LDL 86.6 06/05/2023 10:24 AM    VLDL 17 06/13/2024 09:17 AM     No results found for: \"VITD3\"    Lab Results   Component Value Date/Time    TSH 0.89 06/13/2024 09:17 AM

## 2025-06-10 NOTE — TELEPHONE ENCOUNTER
Called and spoke to patient, two ID confirmed.  Pt does not have enough medication to last until next appt.

## 2025-06-24 ENCOUNTER — OFFICE VISIT (OUTPATIENT)
Age: 60
End: 2025-06-24
Payer: COMMERCIAL

## 2025-06-24 ENCOUNTER — HOSPITAL ENCOUNTER (OUTPATIENT)
Facility: HOSPITAL | Age: 60
Setting detail: SPECIMEN
Discharge: HOME | End: 2025-06-27
Payer: COMMERCIAL

## 2025-06-24 VITALS
TEMPERATURE: 97.7 F | HEIGHT: 67 IN | OXYGEN SATURATION: 98 % | WEIGHT: 195 LBS | DIASTOLIC BLOOD PRESSURE: 60 MMHG | RESPIRATION RATE: 15 BRPM | BODY MASS INDEX: 30.61 KG/M2 | HEART RATE: 77 BPM | SYSTOLIC BLOOD PRESSURE: 92 MMHG

## 2025-06-24 DIAGNOSIS — E78.2 MIXED HYPERLIPIDEMIA: ICD-10-CM

## 2025-06-24 DIAGNOSIS — Z53.20 COLONOSCOPY REFUSED: ICD-10-CM

## 2025-06-24 DIAGNOSIS — F32.A ANXIETY AND DEPRESSION: ICD-10-CM

## 2025-06-24 DIAGNOSIS — I48.0 PAF (PAROXYSMAL ATRIAL FIBRILLATION) (HCC): ICD-10-CM

## 2025-06-24 DIAGNOSIS — Z01.419 WELL WOMAN EXAM WITH ROUTINE GYNECOLOGICAL EXAM: ICD-10-CM

## 2025-06-24 DIAGNOSIS — R73.03 PREDIABETES: ICD-10-CM

## 2025-06-24 DIAGNOSIS — Z00.00 ANNUAL PHYSICAL EXAM: Primary | ICD-10-CM

## 2025-06-24 DIAGNOSIS — F41.9 ANXIETY AND DEPRESSION: ICD-10-CM

## 2025-06-24 LAB
ALBUMIN SERPL-MCNC: 4 G/DL (ref 3.5–5)
ALBUMIN/GLOB SERPL: 1.4 (ref 1.1–2.2)
ALP SERPL-CCNC: 76 U/L (ref 45–117)
ALT SERPL-CCNC: 39 U/L (ref 12–78)
ANION GAP SERPL CALC-SCNC: 6 MMOL/L (ref 2–12)
AST SERPL-CCNC: 20 U/L (ref 15–37)
BILIRUB SERPL-MCNC: 0.8 MG/DL (ref 0.2–1)
BUN SERPL-MCNC: 10 MG/DL (ref 6–20)
BUN/CREAT SERPL: 13 (ref 12–20)
CALCIUM SERPL-MCNC: 9.1 MG/DL (ref 8.5–10.1)
CHLORIDE SERPL-SCNC: 103 MMOL/L (ref 97–108)
CHOLEST SERPL-MCNC: 128 MG/DL
CO2 SERPL-SCNC: 30 MMOL/L (ref 21–32)
CREAT SERPL-MCNC: 0.8 MG/DL (ref 0.55–1.02)
ERYTHROCYTE [DISTWIDTH] IN BLOOD BY AUTOMATED COUNT: 12.7 % (ref 11.5–14.5)
EST. AVERAGE GLUCOSE BLD GHB EST-MCNC: 97 MG/DL
GLOBULIN SER CALC-MCNC: 2.8 G/DL (ref 2–4)
GLUCOSE SERPL-MCNC: 76 MG/DL (ref 65–100)
HBA1C MFR BLD: 5 % (ref 4–5.6)
HCT VFR BLD AUTO: 42.9 % (ref 35–47)
HDLC SERPL-MCNC: 58 MG/DL
HDLC SERPL: 2.2 (ref 0–5)
HGB BLD-MCNC: 13.1 G/DL (ref 11.5–16)
LDLC SERPL CALC-MCNC: 59.4 MG/DL (ref 0–100)
MCH RBC QN AUTO: 30 PG (ref 26–34)
MCHC RBC AUTO-ENTMCNC: 30.5 G/DL (ref 30–36.5)
MCV RBC AUTO: 98.4 FL (ref 80–99)
NRBC # BLD: 0 K/UL (ref 0–0.01)
NRBC BLD-RTO: 0 PER 100 WBC
PLATELET # BLD AUTO: 217 K/UL (ref 150–400)
PMV BLD AUTO: 10.8 FL (ref 8.9–12.9)
POTASSIUM SERPL-SCNC: 3.9 MMOL/L (ref 3.5–5.1)
PROT SERPL-MCNC: 6.8 G/DL (ref 6.4–8.2)
RBC # BLD AUTO: 4.36 M/UL (ref 3.8–5.2)
SODIUM SERPL-SCNC: 139 MMOL/L (ref 136–145)
TRIGL SERPL-MCNC: 53 MG/DL
TSH SERPL DL<=0.05 MIU/L-ACNC: 1.8 UIU/ML (ref 0.36–3.74)
VLDLC SERPL CALC-MCNC: 10.6 MG/DL
WBC # BLD AUTO: 5.1 K/UL (ref 3.6–11)

## 2025-06-24 PROCEDURE — 87624 HPV HI-RISK TYP POOLED RSLT: CPT

## 2025-06-24 PROCEDURE — 88175 CYTOPATH C/V AUTO FLUID REDO: CPT

## 2025-06-24 PROCEDURE — 99396 PREV VISIT EST AGE 40-64: CPT | Performed by: FAMILY MEDICINE

## 2025-06-24 RX ORDER — TIRZEPATIDE 12.5 MG/.5ML
INJECTION, SOLUTION SUBCUTANEOUS WEEKLY
COMMUNITY

## 2025-06-24 SDOH — ECONOMIC STABILITY: FOOD INSECURITY: WITHIN THE PAST 12 MONTHS, YOU WORRIED THAT YOUR FOOD WOULD RUN OUT BEFORE YOU GOT MONEY TO BUY MORE.: NEVER TRUE

## 2025-06-24 SDOH — ECONOMIC STABILITY: FOOD INSECURITY: WITHIN THE PAST 12 MONTHS, THE FOOD YOU BOUGHT JUST DIDN'T LAST AND YOU DIDN'T HAVE MONEY TO GET MORE.: NEVER TRUE

## 2025-06-24 ASSESSMENT — ENCOUNTER SYMPTOMS
GASTROINTESTINAL NEGATIVE: 1
ALLERGIC/IMMUNOLOGIC NEGATIVE: 1
EYES NEGATIVE: 1
BLOOD IN STOOL: 0
ABDOMINAL PAIN: 0
RESPIRATORY NEGATIVE: 1
NAUSEA: 0
VOMITING: 0

## 2025-06-24 ASSESSMENT — PATIENT HEALTH QUESTIONNAIRE - PHQ9
SUM OF ALL RESPONSES TO PHQ QUESTIONS 1-9: 0
2. FEELING DOWN, DEPRESSED OR HOPELESS: NOT AT ALL
SUM OF ALL RESPONSES TO PHQ QUESTIONS 1-9: 0
1. LITTLE INTEREST OR PLEASURE IN DOING THINGS: NOT AT ALL
SUM OF ALL RESPONSES TO PHQ QUESTIONS 1-9: 0
SUM OF ALL RESPONSES TO PHQ QUESTIONS 1-9: 0

## 2025-06-24 NOTE — PROGRESS NOTES
Chief Complaint   Patient presents with    Annual Exam & Well Woman w/ Gyn Exam     Fasting    Cholesterol Problem    Prediabetes     HISTORY OF PRESENT ILLNESS   HPI  Annual CPE & Well Woman Visit w/ Gyn exam and Pap  Fasting for labs  History of Hyperlipidemia, Prediabetes, PAF, BMI >30, Depression, Anxiety  Complying routinely with medication regimen updated below and tolerating without reaction or side effects  Mood remains good and stable  Cardiologist discontinued diltiazem after her cardiac ablation in 12/2024  Last Pap smear 2020, negative  No history of abnormal Paps  No breast, /GYN complaints    Diet: significantly reduced portions since being on Zepbound 1/2024 to present; making healthy food choices; did Weight Watchers 2/2023-8/2023; restarted 1/2024 for a few months; in the past did low carb, low fat diet through [employer] 4/2021-7/2021      Exercise: since 5/2023 walks dog currently up to 3-4 x a week x 15-20 minutes bc that is the most she can tolerate due to her knees (scheduled for knee replacement 7/2025); was doing water exercises at [facility] 2/2023-5/2024 ~ 2-3 x a week x 1 hr; stopped due to cost so now looking into [employer] water exercise classes    Caffeine: 1 large mug of half caff coffee a day, occ ice tea in the afternoons      Weight: 290's-310's over the past several years; did [employer] 4/2021-7/2021, stopped due to cost;  lost ~15-20 lbs when she did Weight Watchers 2/2023-8/2023 and Prep Program at [facility]; got weight down from 328 lbs in 1-2023 to 300 lbs in ; started a weight mgt program at [facility] [place] 1/2024 recommended by her Ortho; starting weight there was 311 lbs; started on Zepbound 1/2024 and has lost ~ 100 lbs since that time   REVIEW OF SYMPTOMS   Review of Systems   Constitutional: Negative.    HENT: Negative.     Eyes: Negative.    Respiratory: Negative.     Cardiovascular: Negative.    Gastrointestinal: Negative.  Negative for

## 2025-06-24 NOTE — PROGRESS NOTES
Chief Complaint   Patient presents with    Annual Exam    Cholesterol Problem    Depression    Other     Pre-diabetes       1. \"Have you been to the ER, urgent care clinic since your last visit?  Hospitalized since your last visit?\" No    2. \"Have you seen or consulted any other health care providers outside of the [facility] since your last visit?\" No     3. For patients aged 45-75: Has the patient had a colonoscopy / FIT/ Cologuard? No      If the patient is female:    4. For patients aged 40-74: Has the patient had a mammogram within the past 2 years? Yes - no Care Gap present,, 11/25/2024      5. For patients aged 21-65: Has the patient had a pap smear? No, cytology 2020         “Have you had a pap smear?”    Yes  Date of last Cervical Cancer screen (HPV or PAP): 2/11/2020         “Have you had a colorectal cancer screening such as a colonoscopy/FIT/Cologuard?    NO  Refuses  No colonoscopy on file  No cologuard on file  No FIT/FOBT on file   No flexible sigmoidoscopy on file         Click Here for Release of Records Request           6/24/2025     8:39 AM   PHQ-9    Little interest or pleasure in doing things 0   Feeling down, depressed, or hopeless 0   PHQ-2 Score 0   PHQ-9 Total Score 0           Financial Resource Strain: Low Risk  (6/13/2024)    Overall Financial Resource Strain (CARDIA)     Difficulty of Paying Living Expenses: Not hard at all      Food Insecurity: No Food Insecurity (6/24/2025)    Hunger Vital Sign     Worried About Running Out of Food in the Last Year: Never true     Ran Out of Food in the Last Year: Never true          Health Maintenance Due   Topic Date Due    Colorectal Cancer Screen  Never done    Pneumococcal 50+ years Vaccine (1 of 1 - PCV) Never done    DTaP/Tdap/Td vaccine (2 - Td or Tdap) 08/20/2023    Cervical cancer screen  02/11/2025    A1C test (Diabetic or Prediabetic)  06/13/2025    Lipids  06/13/2025    Depression Monitoring  06/13/2025

## 2025-06-26 LAB — HPV I/H RISK 1 DNA CVX QL PROBE+SIG AMP: NEGATIVE

## 2025-06-26 RX ORDER — ESCITALOPRAM OXALATE 10 MG/1
10 TABLET ORAL NIGHTLY
Qty: 90 TABLET | Refills: 3 | Status: SHIPPED | OUTPATIENT
Start: 2025-06-26

## 2025-06-29 ENCOUNTER — RESULTS FOLLOW-UP (OUTPATIENT)
Age: 60
End: 2025-06-29

## 2025-07-01 ENCOUNTER — RESULTS FOLLOW-UP (OUTPATIENT)
Age: 60
End: 2025-07-01

## 2025-07-01 ENCOUNTER — TELEPHONE (OUTPATIENT)
Age: 60
End: 2025-07-01

## 2025-07-01 NOTE — TELEPHONE ENCOUNTER
Please call to advise patient that the pathologist reports that her pap smear shows rare atypical cells that precancerous cells cannot be ruled out. I would therefore recommend she see a Gyn for further evaluation. If she does not have a gyn that she has seen in previous years, give her info for [facility] and she can all around per insurance. Advise once she gets appt to call back w/ appt info so that we can fax her records/results.

## 2025-07-02 ENCOUNTER — TELEPHONE (OUTPATIENT)
Age: 60
End: 2025-07-02

## 2025-07-02 NOTE — TELEPHONE ENCOUNTER
Called patient. Name and  confirmed. Conveyed lab results per provider's note. Patient verbalized understanding.  Gave pt numbers for [facility]@ [place] and [facility].

## 2025-07-09 RX ORDER — BUPROPION HYDROCHLORIDE 150 MG/1
150 TABLET ORAL EVERY MORNING
Qty: 90 TABLET | Refills: 3 | Status: SHIPPED | OUTPATIENT
Start: 2025-07-09

## 2025-07-09 NOTE — TELEPHONE ENCOUNTER
PCP: Rut Salmon MD    Last appt: 6/24/2025     Future Appointments   Date Time Provider Department Center   7/14/2025  8:40 AM Rut Salmon MD [facility] DEP       Requested Prescriptions     Pending Prescriptions Disp Refills    buPROPion (WELLBUTRIN XL) 150 MG extended release tablet [Pharmacy Med Name: buPROPion HCl ER (XL) 150 MG Oral Tablet Extended Release 24 Hour] 30 tablet 0     Sig: TAKE 1 TABLET BY MOUTH ONCE DAILY IN THE MORNING       Prior labs and Blood pressures:  BP Readings from Last 3 Encounters:   06/24/25 92/60   06/13/24 122/74   06/05/23 136/74     Lab Results   Component Value Date/Time     06/24/2025 09:35 AM    K 3.9 06/24/2025 09:35 AM     06/24/2025 09:35 AM    CO2 30 06/24/2025 09:35 AM    BUN 10 06/24/2025 09:35 AM    GFRAA >60 06/01/2022 12:12 PM     No results found for: \"HBA1C\", \"[id number]\"  Lab Results   Component Value Date/Time    CHOL 128 06/24/2025 09:35 AM    HDL 58 06/24/2025 09:35 AM    LDL 59.4 06/24/2025 09:35 AM    LDL 86.6 06/05/2023 10:24 AM    VLDL 10.6 06/24/2025 09:35 AM     No results found for: \"VITD3\"    Lab Results   Component Value Date/Time    TSH 1.80 06/24/2025 09:35 AM

## 2025-07-14 ENCOUNTER — TELEPHONE (OUTPATIENT)
Age: 60
End: 2025-07-14

## 2025-07-14 ENCOUNTER — OFFICE VISIT (OUTPATIENT)
Age: 60
End: 2025-07-14
Payer: COMMERCIAL

## 2025-07-14 VITALS
DIASTOLIC BLOOD PRESSURE: 60 MMHG | RESPIRATION RATE: 14 BRPM | HEART RATE: 69 BPM | BODY MASS INDEX: 30.73 KG/M2 | WEIGHT: 195.8 LBS | HEIGHT: 67 IN | SYSTOLIC BLOOD PRESSURE: 92 MMHG | TEMPERATURE: 97.7 F

## 2025-07-14 DIAGNOSIS — I48.0 PAF (PAROXYSMAL ATRIAL FIBRILLATION) (HCC): ICD-10-CM

## 2025-07-14 DIAGNOSIS — Z01.818 PREOP EXAMINATION: Primary | ICD-10-CM

## 2025-07-14 DIAGNOSIS — E78.2 MIXED HYPERLIPIDEMIA: ICD-10-CM

## 2025-07-14 DIAGNOSIS — I87.2 VENOUS INSUFFICIENCY OF BOTH LOWER EXTREMITIES: ICD-10-CM

## 2025-07-14 DIAGNOSIS — R73.03 PREDIABETES: ICD-10-CM

## 2025-07-14 DIAGNOSIS — M17.12 PRIMARY OSTEOARTHRITIS OF LEFT KNEE: ICD-10-CM

## 2025-07-14 PROCEDURE — 99214 OFFICE O/P EST MOD 30 MIN: CPT | Performed by: FAMILY MEDICINE

## 2025-07-14 ASSESSMENT — ENCOUNTER SYMPTOMS
GASTROINTESTINAL NEGATIVE: 1
EYES NEGATIVE: 1
RESPIRATORY NEGATIVE: 1
ALLERGIC/IMMUNOLOGIC NEGATIVE: 1

## 2025-07-14 NOTE — PROGRESS NOTES
Chief Complaint   Patient presents with    Pre-op Exam     Left Knee Replacement 8/11/25 Dr. Kev Junior      HISTORY OF PRESENT ILLNESS   HPI  PRE-OP H&P    Surgery: Left Total Knee Replacement     Date of Surgery: 8-    Surgeon: Dr. Kev Junior    Anesthesia: General    Latex Allergy: Yes    Allergy or Reaction to Tapes or Adhesives: Yes, rubber bandages/tapes    History of Reactions to Anesthesia: Nausea    History of MRSA: No    ASA: No    Coumadin/Xarelto/Eliquis/Pradaxa: Eliquis for history of PAF    Nsaid's: No    Asthma/COPD/Pulmonary Disease: No    Bleeding/Bruising or Clotting Disorder: No    H/O DVT or Pulmonary Embolism: No    History of JOSE EDUARDO: Yes  If so, uses CPAP: Yes    Assessment of Cardiac Functional Status   Can perform > or < 4 Mets? <      <4 METS >4 METS   Care for self Climb a flight of stairs or a hill   Walk indoors around house Walk on level ground at 4 mph   Walk 2-3 blocks on level ground (2-3 mph) Run a short distance   Light work around house (dust, dishes) Heavy work around house (scrub floors, move furniture)     Prior cardiac evaluation:  Yes  Sees cardiologist: Yes, Dr. Doe at [facility]    Other Risk Factors    ETOH use:  No  Smoking status:  Never  Personal or FH of bleeding problems:  No  Personal or FH of blood clots:  No  Personal or FH of anesthesia problems: Personal history of nausea only, no family history    Refer to Pre-Op Form for other pertinent requested History and ROS     REVIEW OF SYMPTOMS   Review of Systems   Constitutional: Negative.    HENT: Negative.     Eyes: Negative.    Respiratory: Negative.     Cardiovascular: Negative.    Gastrointestinal: Negative.    Endocrine: Negative.    Genitourinary: Negative.    Allergic/Immunologic: Negative.    Neurological: Negative.    Hematological: Negative.    Psychiatric/Behavioral: Negative.         PROBLEM LIST/MEDICAL HISTORY     Patient Active Problem List    Diagnosis Date Noted    Neuropathy of both feet

## 2025-07-14 NOTE — PROGRESS NOTES
Chief Complaint   Patient presents with    Pre-op Exam       1. \"Have you been to the ER, urgent care clinic since your last visit?  Hospitalized since your last visit?\" No    2. \"Have you seen or consulted any other health care providers outside of the [facility] since your last visit?\" [facility], biopsy, negative, Dr. Middleton,     3. For patients aged 45-75: Has the patient had a colonoscopy / FIT/ Cologuard? No      If the patient is female:    4. For patients aged 40-74: Has the patient had a mammogram within the past 2 years? Yes - no Care Gap [id number]      5. For patients aged 21-65: Has the patient had a pap smear? Yes - no Care Gap present, 6.24.2025                Click Here for Release of Records Request           6/24/2025     8:39 AM   PHQ-9    Little interest or pleasure in doing things 0   Feeling down, depressed, or hopeless 0   PHQ-2 Score 0   PHQ-9 Total Score 0           Financial Resource Strain: Low Risk  (6/13/2024)    Overall Financial Resource Strain (CARDIA)     Difficulty of Paying Living Expenses: Not hard at all      Food Insecurity: No Food Insecurity (6/24/2025)    Hunger Vital Sign     Worried About Running Out of Food in the Last Year: Never true     Ran Out of Food in the Last Year: Never true          Health Maintenance Due   Topic Date Due    Pneumococcal 50+ years Vaccine (1 of 1 - PCV) Never done    DTaP/Tdap/Td vaccine (2 - Td or Tdap) 08/20/2023